# Patient Record
Sex: FEMALE | Race: WHITE | Employment: OTHER | URBAN - METROPOLITAN AREA
[De-identification: names, ages, dates, MRNs, and addresses within clinical notes are randomized per-mention and may not be internally consistent; named-entity substitution may affect disease eponyms.]

---

## 2017-03-29 RX ORDER — VENLAFAXINE HYDROCHLORIDE 37.5 MG/1
CAPSULE, EXTENDED RELEASE ORAL
Qty: 30 CAPSULE | Refills: 0 | Status: SHIPPED | OUTPATIENT
Start: 2017-03-29 | End: 2017-04-24 | Stop reason: SDUPTHER

## 2017-04-24 ENCOUNTER — OFFICE VISIT (OUTPATIENT)
Dept: FAMILY MEDICINE CLINIC | Age: 82
End: 2017-04-24

## 2017-04-24 VITALS
HEART RATE: 78 BPM | HEIGHT: 60 IN | TEMPERATURE: 97.2 F | BODY MASS INDEX: 23.56 KG/M2 | DIASTOLIC BLOOD PRESSURE: 70 MMHG | RESPIRATION RATE: 12 BRPM | WEIGHT: 120 LBS | SYSTOLIC BLOOD PRESSURE: 120 MMHG

## 2017-04-24 DIAGNOSIS — F41.9 ANXIETY: Primary | ICD-10-CM

## 2017-04-24 DIAGNOSIS — E78.1 HYPERTRIGLYCERIDEMIA: ICD-10-CM

## 2017-04-24 DIAGNOSIS — F32.A DEPRESSION, UNSPECIFIED DEPRESSION TYPE: ICD-10-CM

## 2017-04-24 PROCEDURE — 99213 OFFICE O/P EST LOW 20 MIN: CPT | Performed by: FAMILY MEDICINE

## 2017-04-24 RX ORDER — VENLAFAXINE HYDROCHLORIDE 37.5 MG/1
CAPSULE, EXTENDED RELEASE ORAL
Qty: 30 CAPSULE | Refills: 5 | Status: SHIPPED | OUTPATIENT
Start: 2017-04-24 | End: 2017-10-30 | Stop reason: SDUPTHER

## 2017-04-28 RX ORDER — ALPRAZOLAM 0.5 MG/1
0.5 TABLET ORAL NIGHTLY PRN
Qty: 10 TABLET | Refills: 0 | Status: SHIPPED | OUTPATIENT
Start: 2017-04-28 | End: 2017-10-30 | Stop reason: SDUPTHER

## 2017-10-24 DIAGNOSIS — E78.1 HYPERTRIGLYCERIDEMIA: ICD-10-CM

## 2017-10-24 LAB
ALBUMIN SERPL-MCNC: 4.1 G/DL (ref 3.9–4.9)
ALP BLD-CCNC: 67 U/L (ref 40–130)
ALT SERPL-CCNC: 10 U/L (ref 0–33)
ANION GAP SERPL CALCULATED.3IONS-SCNC: 15 MEQ/L (ref 7–13)
AST SERPL-CCNC: 16 U/L (ref 0–35)
BASOPHILS ABSOLUTE: 0 K/UL (ref 0–0.2)
BASOPHILS RELATIVE PERCENT: 0.6 %
BILIRUB SERPL-MCNC: 0.4 MG/DL (ref 0–1.2)
BUN BLDV-MCNC: 48 MG/DL (ref 8–23)
CALCIUM SERPL-MCNC: 9.8 MG/DL (ref 8.6–10.2)
CHLORIDE BLD-SCNC: 109 MEQ/L (ref 98–107)
CHOLESTEROL, TOTAL: 220 MG/DL (ref 0–199)
CO2: 15 MEQ/L (ref 22–29)
CREAT SERPL-MCNC: 1.68 MG/DL (ref 0.5–0.9)
EOSINOPHILS ABSOLUTE: 0.1 K/UL (ref 0–0.7)
EOSINOPHILS RELATIVE PERCENT: 2.1 %
GFR AFRICAN AMERICAN: 35.4
GFR NON-AFRICAN AMERICAN: 29.2
GLOBULIN: 4.8 G/DL (ref 2.3–3.5)
GLUCOSE BLD-MCNC: 94 MG/DL (ref 74–109)
HCT VFR BLD CALC: 34.8 % (ref 37–47)
HDLC SERPL-MCNC: 31 MG/DL (ref 40–59)
HEMOGLOBIN: 11.1 G/DL (ref 12–16)
LDL CHOLESTEROL CALCULATED: 147 MG/DL (ref 0–129)
LYMPHOCYTES ABSOLUTE: 2.3 K/UL (ref 1–4.8)
LYMPHOCYTES RELATIVE PERCENT: 47.3 %
MCH RBC QN AUTO: 32.2 PG (ref 27–31.3)
MCHC RBC AUTO-ENTMCNC: 32 % (ref 33–37)
MCV RBC AUTO: 100.8 FL (ref 82–100)
MONOCYTES ABSOLUTE: 0.7 K/UL (ref 0.2–0.8)
MONOCYTES RELATIVE PERCENT: 14.7 %
NEUTROPHILS ABSOLUTE: 1.7 K/UL (ref 1.4–6.5)
NEUTROPHILS RELATIVE PERCENT: 35.3 %
PDW BLD-RTO: 13.6 % (ref 11.5–14.5)
PLATELET # BLD: 170 K/UL (ref 130–400)
POTASSIUM SERPL-SCNC: 4.5 MEQ/L (ref 3.5–5.1)
RBC # BLD: 3.45 M/UL (ref 4.2–5.4)
SODIUM BLD-SCNC: 139 MEQ/L (ref 132–144)
TOTAL PROTEIN: 8.9 G/DL (ref 6.4–8.1)
TRIGL SERPL-MCNC: 208 MG/DL (ref 0–200)
WBC # BLD: 4.8 K/UL (ref 4.8–10.8)

## 2017-10-30 ENCOUNTER — TELEPHONE (OUTPATIENT)
Dept: FAMILY MEDICINE CLINIC | Age: 82
End: 2017-10-30

## 2017-10-30 ENCOUNTER — OFFICE VISIT (OUTPATIENT)
Dept: FAMILY MEDICINE CLINIC | Age: 82
End: 2017-10-30

## 2017-10-30 VITALS
SYSTOLIC BLOOD PRESSURE: 110 MMHG | BODY MASS INDEX: 22.56 KG/M2 | DIASTOLIC BLOOD PRESSURE: 60 MMHG | TEMPERATURE: 97.9 F | HEIGHT: 60 IN | HEART RATE: 70 BPM | RESPIRATION RATE: 16 BRPM | WEIGHT: 114.9 LBS | OXYGEN SATURATION: 96 %

## 2017-10-30 DIAGNOSIS — F32.A DEPRESSION, UNSPECIFIED DEPRESSION TYPE: ICD-10-CM

## 2017-10-30 DIAGNOSIS — M17.0 PRIMARY OSTEOARTHRITIS OF BOTH KNEES: ICD-10-CM

## 2017-10-30 DIAGNOSIS — F41.9 ANXIETY: Primary | ICD-10-CM

## 2017-10-30 DIAGNOSIS — E78.1 HYPERTRIGLYCERIDEMIA: ICD-10-CM

## 2017-10-30 DIAGNOSIS — I10 ESSENTIAL HYPERTENSION: ICD-10-CM

## 2017-10-30 DIAGNOSIS — N18.4 CKD (CHRONIC KIDNEY DISEASE) STAGE 4, GFR 15-29 ML/MIN (HCC): ICD-10-CM

## 2017-10-30 PROCEDURE — 99214 OFFICE O/P EST MOD 30 MIN: CPT | Performed by: FAMILY MEDICINE

## 2017-10-30 RX ORDER — ALPRAZOLAM 0.5 MG/1
0.5 TABLET ORAL NIGHTLY PRN
Qty: 30 TABLET | Refills: 0 | Status: SHIPPED | OUTPATIENT
Start: 2017-10-30 | End: 2018-01-18 | Stop reason: SDUPTHER

## 2017-10-30 RX ORDER — VENLAFAXINE HYDROCHLORIDE 37.5 MG/1
CAPSULE, EXTENDED RELEASE ORAL
Qty: 30 CAPSULE | Refills: 5 | Status: SHIPPED | OUTPATIENT
Start: 2017-10-30 | End: 2018-05-08 | Stop reason: SDUPTHER

## 2017-10-30 RX ORDER — PRAVASTATIN SODIUM 10 MG
10 TABLET ORAL DAILY
Qty: 30 TABLET | Refills: 2 | Status: SHIPPED | OUTPATIENT
Start: 2017-10-30 | End: 2018-01-18 | Stop reason: SDUPTHER

## 2017-10-30 ASSESSMENT — ENCOUNTER SYMPTOMS
SHORTNESS OF BREATH: 0
ABDOMINAL PAIN: 0

## 2017-12-05 ENCOUNTER — TELEPHONE (OUTPATIENT)
Dept: FAMILY MEDICINE CLINIC | Age: 82
End: 2017-12-05

## 2018-01-18 ENCOUNTER — OFFICE VISIT (OUTPATIENT)
Dept: FAMILY MEDICINE CLINIC | Age: 83
End: 2018-01-18

## 2018-01-18 ENCOUNTER — CARE COORDINATOR VISIT (OUTPATIENT)
Dept: CARE COORDINATION | Age: 83
End: 2018-01-18

## 2018-01-18 VITALS
WEIGHT: 112 LBS | DIASTOLIC BLOOD PRESSURE: 62 MMHG | BODY MASS INDEX: 21.99 KG/M2 | RESPIRATION RATE: 16 BRPM | SYSTOLIC BLOOD PRESSURE: 108 MMHG | TEMPERATURE: 97.9 F | HEART RATE: 80 BPM | HEIGHT: 60 IN

## 2018-01-18 DIAGNOSIS — F41.9 ANXIETY: ICD-10-CM

## 2018-01-18 DIAGNOSIS — J20.9 ACUTE BRONCHITIS, UNSPECIFIED ORGANISM: Primary | ICD-10-CM

## 2018-01-18 DIAGNOSIS — F32.A DEPRESSION, UNSPECIFIED DEPRESSION TYPE: ICD-10-CM

## 2018-01-18 DIAGNOSIS — E78.1 HYPERTRIGLYCERIDEMIA: ICD-10-CM

## 2018-01-18 PROCEDURE — 99214 OFFICE O/P EST MOD 30 MIN: CPT | Performed by: FAMILY MEDICINE

## 2018-01-18 RX ORDER — ALPRAZOLAM 0.5 MG/1
0.5 TABLET ORAL NIGHTLY PRN
Qty: 30 TABLET | Refills: 0 | Status: SHIPPED | OUTPATIENT
Start: 2018-01-18 | End: 2018-06-12 | Stop reason: SDUPTHER

## 2018-01-18 RX ORDER — PRAVASTATIN SODIUM 10 MG
10 TABLET ORAL DAILY
Qty: 30 TABLET | Refills: 2 | Status: SHIPPED | OUTPATIENT
Start: 2018-01-18 | End: 2018-10-08

## 2018-01-18 RX ORDER — DEXTROMETHORPHAN HYDROBROMIDE AND PROMETHAZINE HYDROCHLORIDE 15; 6.25 MG/5ML; MG/5ML
5 SYRUP ORAL 4 TIMES DAILY PRN
Qty: 150 ML | Refills: 0 | Status: SHIPPED | OUTPATIENT
Start: 2018-01-18 | End: 2018-01-25

## 2018-01-18 RX ORDER — AZITHROMYCIN 250 MG/1
TABLET, FILM COATED ORAL
Qty: 1 PACKET | Refills: 0 | Status: SHIPPED | OUTPATIENT
Start: 2018-01-18 | End: 2018-01-28

## 2018-01-18 ASSESSMENT — ENCOUNTER SYMPTOMS
COUGH: 1
SHORTNESS OF BREATH: 0
ABDOMINAL PAIN: 0

## 2018-01-18 NOTE — CARE COORDINATION
Met with pt in the office as requested by Dr. Gordy Carrasco. Pt is having increased difficulty caring for herself and her  at home. Her  has dementia and has been falling more at home. She has had a hard time getting him to his appts and feels she really needs some help at home. Her  goes to the [facility] for [facility]. She is currently not getting any services at home.

## 2018-01-18 NOTE — PROGRESS NOTES
needed for Sleep 30 tablet 0    venlafaxine (EFFEXOR XR) 37.5 MG extended release capsule TAKE 1 CAPSULE BY MOUTH ONCE DAILY 30 capsule 5    pravastatin (PRAVACHOL) 10 MG tablet Take 1 tablet by mouth daily 30 tablet 2    Esomeprazole Magnesium (NEXIUM 24HR PO) Take by mouth      lisinopril (PRINIVIL;ZESTRIL) 10 MG tablet Take 1 tablet by mouth daily. 30 tablet 1    potassium chloride (K-DUR) 10 MEQ tablet Take 10 mEq by mouth daily. No current facility-administered medications for this visit. Family History   Problem Relation Age of Onset    Heart Disease Mother     Liver Disease Father     Heart Disease Sister      Past Medical History:   Diagnosis Date    Anemia     Environmental allergies     Hypertension    Controlled Substances Monitoring:     Attestation: The Prescription Monitoring Report for this patient was reviewed today. Arie Homans, MD)  Documentation: No signs of potential drug abuse or diversion identified. Arie Homans, MD)  Objective:   /62   Pulse 80   Temp 97.9 °F (36.6 °C) (Oral)   Resp 16   Ht 5' (1.524 m)   Wt 112 lb (50.8 kg)   LMP  (LMP Unknown)   Breastfeeding? No   BMI 21.87 kg/m²     Physical Exam  Heent: T.M normal bilat Mild pharyngeal injection. No exudate                Nares patent but swollen mucosa noted  Neck: Minimal anter cervical adenopathy. No masses. No thyroid asymmetry. Lungs: Clear equal breath sounds. No wheezes or rales. Heart: Rate reg 1/6 syst murmur along llsb  Gen: In no acute distress  Patient with appropriate affect--tearful at times. Alert   Thought content appropriate  Good eye contact    Assessment:         1. Acute bronchitis, unspecified organism     2. Anxiety  ALPRAZolam (XANAX) 0.5 MG tablet   3. Depression, unspecified depression type     4.  Hypertriglyceridemia        Plan:     Orders Placed This Encounter   Medications    azithromycin (ZITHROMAX) 250 MG tablet     Sig: Take 2 tabs (500 mg) on Day 1, and take 1 tab (250 mg) on days 2 through 5. Dispense:  1 packet     Refill:  0    promethazine-dextromethorphan (PROMETHAZINE-DM) 6.25-15 MG/5ML syrup     Sig: Take 5 mLs by mouth 4 times daily as needed for Cough     Dispense:  150 mL     Refill:  0    ALPRAZolam (XANAX) 0.5 MG tablet     Sig: Take 1 tablet by mouth nightly as needed for Sleep for up to 30 days.      Dispense:  30 tablet     Refill:  0    pravastatin (PRAVACHOL) 10 MG tablet     Sig: Take 1 tablet by mouth daily     Dispense:  30 tablet     Refill:  2   f/u after fasting blood work in 8 weeks, sooner if needed

## 2018-01-18 NOTE — PATIENT INSTRUCTIONS
Thank you for enrolling in [address] Please follow the instructions below to securely access your online medical record. [facility] allows you to send messages to your doctor, view your test results, renew your prescriptions, schedule appointments, and more. How Do I Sign Up? 1. In your Internet browser, go to [email or web address]  2. Click on the Sign Up Now link in the Sign In box. You will see the New Member Sign Up page. 3. Enter your [facility] Access Code exactly as it appears below. You will not need to use this code after youve completed the sign-up process. If you do not sign up before the expiration date, you must request a new code. [facility] Access Code: [email or web address]  Expires: 3/19/2018 11:50 AM    4. Enter your Social Security Number ([id number]) and Date of Birth (mm/dd/yyyy) as indicated and click Submit. You will be taken to the next sign-up page. 5. Create a [facility] ID. This will be your [facility] login ID and cannot be changed, so think of one that is secure and easy to remember. 6. Create a [facility] password. You can change your password at any time. 7. Enter your Password Reset Question and Answer. This can be used at a later time if you forget your password. 8. Enter your e-mail address. You will receive e-mail notification when new information is available in [address] 9. Click Sign Up. You can now view your medical record. Additional Information  If you have questions, please contact your physician practice where you receive care. Remember, [facility] is NOT to be used for urgent needs. For medical emergencies, dial 911.

## 2018-01-19 ENCOUNTER — CARE COORDINATION (OUTPATIENT)
Dept: CARE COORDINATION | Age: 83
End: 2018-01-19

## 2018-01-22 ENCOUNTER — CARE COORDINATION (OUTPATIENT)
Dept: CARE COORDINATION | Age: 83
End: 2018-01-22

## 2018-01-25 ENCOUNTER — CARE COORDINATION (OUTPATIENT)
Dept: CARE COORDINATION | Age: 83
End: 2018-01-25

## 2018-02-09 ENCOUNTER — CARE COORDINATION (OUTPATIENT)
Dept: CARE COORDINATION | Age: 83
End: 2018-02-09

## 2018-03-02 ENCOUNTER — CARE COORDINATION (OUTPATIENT)
Dept: CARE COORDINATION | Age: 83
End: 2018-03-02

## 2018-03-23 ENCOUNTER — CARE COORDINATION (OUTPATIENT)
Dept: CARE COORDINATION | Age: 83
End: 2018-03-23

## 2018-03-23 NOTE — CARE COORDINATION
[address] call to patient regarding care coordination/social work.    Left voicemail asking for return call

## 2018-04-10 ENCOUNTER — CARE COORDINATION (OUTPATIENT)
Dept: CARE COORDINATION | Age: 83
End: 2018-04-10

## 2018-04-26 ENCOUNTER — CARE COORDINATION (OUTPATIENT)
Dept: CARE COORDINATION | Age: 83
End: 2018-04-26

## 2018-05-09 RX ORDER — VENLAFAXINE HYDROCHLORIDE 37.5 MG/1
CAPSULE, EXTENDED RELEASE ORAL
Qty: 30 CAPSULE | Refills: 0 | Status: SHIPPED | OUTPATIENT
Start: 2018-05-09 | End: 2018-06-08 | Stop reason: SDUPTHER

## 2018-05-30 ENCOUNTER — CARE COORDINATION (OUTPATIENT)
Dept: CARE COORDINATION | Age: 83
End: 2018-05-30

## 2018-06-11 ENCOUNTER — CARE COORDINATION (OUTPATIENT)
Dept: CARE COORDINATION | Age: 83
End: 2018-06-11

## 2018-06-11 RX ORDER — VENLAFAXINE HYDROCHLORIDE 37.5 MG/1
CAPSULE, EXTENDED RELEASE ORAL
Qty: 30 CAPSULE | Refills: 0 | Status: SHIPPED | OUTPATIENT
Start: 2018-06-11 | End: 2018-06-12 | Stop reason: SDUPTHER

## 2018-06-12 ENCOUNTER — OFFICE VISIT (OUTPATIENT)
Dept: FAMILY MEDICINE CLINIC | Age: 83
End: 2018-06-12
Payer: COMMERCIAL

## 2018-06-12 VITALS
TEMPERATURE: 98.2 F | WEIGHT: 116.5 LBS | SYSTOLIC BLOOD PRESSURE: 128 MMHG | DIASTOLIC BLOOD PRESSURE: 84 MMHG | BODY MASS INDEX: 22.87 KG/M2 | HEIGHT: 60 IN | RESPIRATION RATE: 12 BRPM | HEART RATE: 78 BPM | OXYGEN SATURATION: 99 %

## 2018-06-12 DIAGNOSIS — F41.9 ANXIETY: Primary | ICD-10-CM

## 2018-06-12 DIAGNOSIS — N18.4 CKD (CHRONIC KIDNEY DISEASE) STAGE 4, GFR 15-29 ML/MIN (HCC): ICD-10-CM

## 2018-06-12 DIAGNOSIS — F32.A DEPRESSION, UNSPECIFIED DEPRESSION TYPE: ICD-10-CM

## 2018-06-12 DIAGNOSIS — I10 ESSENTIAL HYPERTENSION: ICD-10-CM

## 2018-06-12 DIAGNOSIS — E78.1 HYPERTRIGLYCERIDEMIA: ICD-10-CM

## 2018-06-12 PROCEDURE — 99214 OFFICE O/P EST MOD 30 MIN: CPT | Performed by: FAMILY MEDICINE

## 2018-06-12 RX ORDER — ALPRAZOLAM 0.5 MG/1
0.5 TABLET ORAL NIGHTLY PRN
Qty: 30 TABLET | Refills: 0 | Status: SHIPPED | OUTPATIENT
Start: 2018-06-12 | End: 2018-07-12

## 2018-06-12 RX ORDER — VENLAFAXINE HYDROCHLORIDE 37.5 MG/1
CAPSULE, EXTENDED RELEASE ORAL
Qty: 30 CAPSULE | Refills: 5 | Status: SHIPPED | OUTPATIENT
Start: 2018-06-12 | End: 2019-01-10 | Stop reason: SDUPTHER

## 2018-06-12 ASSESSMENT — ENCOUNTER SYMPTOMS
SHORTNESS OF BREATH: 0
ABDOMINAL PAIN: 0

## 2018-07-18 ENCOUNTER — CARE COORDINATION (OUTPATIENT)
Dept: CARE COORDINATION | Age: 83
End: 2018-07-18

## 2018-08-21 ENCOUNTER — CARE COORDINATION (OUTPATIENT)
Dept: CARE COORDINATION | Age: 83
End: 2018-08-21

## 2018-08-21 NOTE — CARE COORDINATION
Attempted to contact Fatimah to discuss care coordination/ social work. Unable to reach patient by phone. Left voicemail requesting return call.

## 2018-09-12 DIAGNOSIS — I10 ESSENTIAL HYPERTENSION: ICD-10-CM

## 2018-09-12 DIAGNOSIS — E78.1 HYPERTRIGLYCERIDEMIA: ICD-10-CM

## 2018-09-12 LAB
ALBUMIN SERPL-MCNC: 3.8 G/DL (ref 3.9–4.9)
ALP BLD-CCNC: 70 U/L (ref 40–130)
ALT SERPL-CCNC: 11 U/L (ref 0–33)
ANION GAP SERPL CALCULATED.3IONS-SCNC: 15 MEQ/L (ref 7–13)
AST SERPL-CCNC: 19 U/L (ref 0–35)
BASOPHILS ABSOLUTE: 0 K/UL (ref 0–0.2)
BASOPHILS RELATIVE PERCENT: 1 %
BILIRUB SERPL-MCNC: 0.6 MG/DL (ref 0–1.2)
BUN BLDV-MCNC: 40 MG/DL (ref 8–23)
CALCIUM SERPL-MCNC: 9.9 MG/DL (ref 8.6–10.2)
CHLORIDE BLD-SCNC: 104 MEQ/L (ref 98–107)
CHOLESTEROL, TOTAL: 205 MG/DL (ref 0–199)
CO2: 19 MEQ/L (ref 22–29)
CREAT SERPL-MCNC: 1.9 MG/DL (ref 0.5–0.9)
EOSINOPHILS ABSOLUTE: 0.3 K/UL (ref 0–0.7)
EOSINOPHILS RELATIVE PERCENT: 6.4 %
GFR AFRICAN AMERICAN: 30.6
GFR NON-AFRICAN AMERICAN: 25.3
GLOBULIN: 4.7 G/DL (ref 2.3–3.5)
GLUCOSE BLD-MCNC: 78 MG/DL (ref 74–109)
HCT VFR BLD CALC: 34.8 % (ref 37–47)
HDLC SERPL-MCNC: 34 MG/DL (ref 40–59)
HEMOGLOBIN: 11 G/DL (ref 12–16)
LDL CHOLESTEROL CALCULATED: 130 MG/DL (ref 0–129)
LYMPHOCYTES ABSOLUTE: 1.4 K/UL (ref 1–4.8)
LYMPHOCYTES RELATIVE PERCENT: 35.8 %
MCH RBC QN AUTO: 33 PG (ref 27–31.3)
MCHC RBC AUTO-ENTMCNC: 31.7 % (ref 33–37)
MCV RBC AUTO: 104 FL (ref 82–100)
MONOCYTES ABSOLUTE: 0.5 K/UL (ref 0.2–0.8)
MONOCYTES RELATIVE PERCENT: 13.5 %
NEUTROPHILS ABSOLUTE: 1.7 K/UL (ref 1.4–6.5)
NEUTROPHILS RELATIVE PERCENT: 43.3 %
PDW BLD-RTO: 13.5 % (ref 11.5–14.5)
PLATELET # BLD: 181 K/UL (ref 130–400)
POTASSIUM SERPL-SCNC: 4.8 MEQ/L (ref 3.5–5.1)
RBC # BLD: 3.35 M/UL (ref 4.2–5.4)
SODIUM BLD-SCNC: 138 MEQ/L (ref 132–144)
TOTAL PROTEIN: 8.5 G/DL (ref 6.4–8.1)
TRIGL SERPL-MCNC: 205 MG/DL (ref 0–200)
WBC # BLD: 4 K/UL (ref 4.8–10.8)

## 2018-09-19 ENCOUNTER — CARE COORDINATION (OUTPATIENT)
Dept: CARE COORDINATION | Age: 83
End: 2018-09-19

## 2018-09-19 NOTE — CARE COORDINATION
Placed call to Fatimah to discuss care coordination/ social work. Fatimah states he did contact the [address] and is now getting assistance with her . She states [facility] was hesitant to accept help at first, but they are now both grateful for the help. Consulted CC , Bruna Olmos to assist with scheduling patient to see PCP.    Patient is scheduled to see PCP 10/08 at 3:15 PM

## 2018-10-08 ENCOUNTER — OFFICE VISIT (OUTPATIENT)
Dept: FAMILY MEDICINE CLINIC | Age: 83
End: 2018-10-08
Payer: COMMERCIAL

## 2018-10-08 VITALS
HEIGHT: 60 IN | TEMPERATURE: 97.7 F | SYSTOLIC BLOOD PRESSURE: 118 MMHG | HEART RATE: 80 BPM | DIASTOLIC BLOOD PRESSURE: 70 MMHG | WEIGHT: 111.7 LBS | RESPIRATION RATE: 14 BRPM | BODY MASS INDEX: 21.93 KG/M2 | OXYGEN SATURATION: 98 %

## 2018-10-08 DIAGNOSIS — F41.9 ANXIETY: ICD-10-CM

## 2018-10-08 DIAGNOSIS — F32.A DEPRESSION, UNSPECIFIED DEPRESSION TYPE: ICD-10-CM

## 2018-10-08 DIAGNOSIS — D53.9 MACROCYTIC ANEMIA: ICD-10-CM

## 2018-10-08 DIAGNOSIS — I10 ESSENTIAL HYPERTENSION: Primary | ICD-10-CM

## 2018-10-08 DIAGNOSIS — N18.4 CKD (CHRONIC KIDNEY DISEASE) STAGE 4, GFR 15-29 ML/MIN (HCC): ICD-10-CM

## 2018-10-08 DIAGNOSIS — E78.1 HYPERTRIGLYCERIDEMIA: ICD-10-CM

## 2018-10-08 LAB
FOLATE: >20 NG/ML (ref 7.3–26.1)
VITAMIN B-12: 1284 PG/ML (ref 232–1245)

## 2018-10-08 PROCEDURE — 99214 OFFICE O/P EST MOD 30 MIN: CPT | Performed by: FAMILY MEDICINE

## 2018-10-08 RX ORDER — LANOLIN ALCOHOL/MO/W.PET/CERES
1000 CREAM (GRAM) TOPICAL DAILY
COMMUNITY

## 2018-10-08 RX ORDER — ALPRAZOLAM 0.5 MG/1
TABLET ORAL
Refills: 0 | COMMUNITY
Start: 2018-08-10 | End: 2019-06-18 | Stop reason: SDUPTHER

## 2018-10-08 ASSESSMENT — ENCOUNTER SYMPTOMS
ABDOMINAL PAIN: 0
SHORTNESS OF BREATH: 0

## 2018-10-08 ASSESSMENT — PATIENT HEALTH QUESTIONNAIRE - PHQ9
SUM OF ALL RESPONSES TO PHQ9 QUESTIONS 1 & 2: 0
2. FEELING DOWN, DEPRESSED OR HOPELESS: 0
SUM OF ALL RESPONSES TO PHQ QUESTIONS 1-9: 0
SUM OF ALL RESPONSES TO PHQ QUESTIONS 1-9: 0
1. LITTLE INTEREST OR PLEASURE IN DOING THINGS: 0

## 2019-01-10 RX ORDER — VENLAFAXINE HYDROCHLORIDE 37.5 MG/1
CAPSULE, EXTENDED RELEASE ORAL
Qty: 30 CAPSULE | Refills: 0 | Status: SHIPPED | OUTPATIENT
Start: 2019-01-10 | End: 2019-02-11 | Stop reason: SDUPTHER

## 2019-02-12 RX ORDER — VENLAFAXINE HYDROCHLORIDE 37.5 MG/1
CAPSULE, EXTENDED RELEASE ORAL
Qty: 30 CAPSULE | Refills: 0 | Status: SHIPPED | OUTPATIENT
Start: 2019-02-12 | End: 2019-03-08 | Stop reason: SDUPTHER

## 2019-03-08 RX ORDER — VENLAFAXINE HYDROCHLORIDE 37.5 MG/1
37.5 CAPSULE, EXTENDED RELEASE ORAL DAILY
Qty: 30 CAPSULE | Refills: 0 | Status: SHIPPED | OUTPATIENT
Start: 2019-03-08 | End: 2019-04-15 | Stop reason: SDUPTHER

## 2019-03-20 ENCOUNTER — OFFICE VISIT (OUTPATIENT)
Dept: FAMILY MEDICINE CLINIC | Age: 84
End: 2019-03-20
Payer: COMMERCIAL

## 2019-03-20 VITALS
HEART RATE: 78 BPM | RESPIRATION RATE: 14 BRPM | HEIGHT: 60 IN | BODY MASS INDEX: 23.48 KG/M2 | OXYGEN SATURATION: 96 % | TEMPERATURE: 97.8 F | DIASTOLIC BLOOD PRESSURE: 82 MMHG | SYSTOLIC BLOOD PRESSURE: 128 MMHG | WEIGHT: 119.6 LBS

## 2019-03-20 DIAGNOSIS — F41.9 ANXIETY: Primary | ICD-10-CM

## 2019-03-20 DIAGNOSIS — N18.30 CHRONIC KIDNEY DISEASE, STAGE III (MODERATE) (HCC): ICD-10-CM

## 2019-03-20 PROCEDURE — 99213 OFFICE O/P EST LOW 20 MIN: CPT | Performed by: FAMILY MEDICINE

## 2019-03-20 ASSESSMENT — ENCOUNTER SYMPTOMS: SHORTNESS OF BREATH: 0

## 2019-03-20 ASSESSMENT — PATIENT HEALTH QUESTIONNAIRE - PHQ9
1. LITTLE INTEREST OR PLEASURE IN DOING THINGS: 1
SUM OF ALL RESPONSES TO PHQ QUESTIONS 1-9: 2
2. FEELING DOWN, DEPRESSED OR HOPELESS: 1
SUM OF ALL RESPONSES TO PHQ9 QUESTIONS 1 & 2: 2
SUM OF ALL RESPONSES TO PHQ QUESTIONS 1-9: 2

## 2019-04-15 RX ORDER — VENLAFAXINE HYDROCHLORIDE 37.5 MG/1
37.5 CAPSULE, EXTENDED RELEASE ORAL DAILY
Qty: 30 CAPSULE | Refills: 1 | Status: SHIPPED | OUTPATIENT
Start: 2019-04-15 | End: 2019-05-20 | Stop reason: SDUPTHER

## 2019-05-20 RX ORDER — VENLAFAXINE HYDROCHLORIDE 37.5 MG/1
37.5 CAPSULE, EXTENDED RELEASE ORAL DAILY
Qty: 30 CAPSULE | Refills: 1 | Status: SHIPPED | OUTPATIENT
Start: 2019-05-20 | End: 2019-06-18 | Stop reason: SDUPTHER

## 2019-06-18 ENCOUNTER — OFFICE VISIT (OUTPATIENT)
Dept: FAMILY MEDICINE CLINIC | Age: 84
End: 2019-06-18
Payer: COMMERCIAL

## 2019-06-18 VITALS
WEIGHT: 121 LBS | HEART RATE: 67 BPM | HEIGHT: 60 IN | DIASTOLIC BLOOD PRESSURE: 64 MMHG | TEMPERATURE: 97.8 F | BODY MASS INDEX: 23.75 KG/M2 | RESPIRATION RATE: 14 BRPM | SYSTOLIC BLOOD PRESSURE: 110 MMHG

## 2019-06-18 DIAGNOSIS — I10 ESSENTIAL HYPERTENSION: ICD-10-CM

## 2019-06-18 DIAGNOSIS — F32.A DEPRESSION, UNSPECIFIED DEPRESSION TYPE: ICD-10-CM

## 2019-06-18 DIAGNOSIS — H61.23 BILATERAL IMPACTED CERUMEN: ICD-10-CM

## 2019-06-18 DIAGNOSIS — F41.9 ANXIETY: Primary | ICD-10-CM

## 2019-06-18 PROCEDURE — 99213 OFFICE O/P EST LOW 20 MIN: CPT | Performed by: FAMILY MEDICINE

## 2019-06-18 RX ORDER — ALPRAZOLAM 0.5 MG/1
TABLET ORAL
Qty: 30 TABLET | Refills: 1 | Status: SHIPPED | OUTPATIENT
Start: 2019-06-18 | End: 2019-10-01 | Stop reason: SDUPTHER

## 2019-06-18 RX ORDER — VENLAFAXINE HYDROCHLORIDE 37.5 MG/1
37.5 CAPSULE, EXTENDED RELEASE ORAL DAILY
Qty: 30 CAPSULE | Refills: 3 | Status: SHIPPED | OUTPATIENT
Start: 2019-06-18 | End: 2019-07-18 | Stop reason: SDUPTHER

## 2019-06-18 ASSESSMENT — ENCOUNTER SYMPTOMS: SHORTNESS OF BREATH: 0

## 2019-06-18 NOTE — PROGRESS NOTES
Subjective:      Patient ID: Iliana Alvarado is a 81 y.o. female    HPI  Here in follow up for anxiety and depression. Using xanax few days per week. Ears feel blocked   Recently and did have evaluation recently by audiology and it was suggested that she have ears cleaned      Review of Systems   Constitutional: Negative for activity change. Respiratory: Negative for shortness of breath. Neurological: Negative for dizziness and weakness. Psychiatric/Behavioral: Negative for decreased concentration. Controlled substances monitoring: no signs of potential drug abuse or diversion identified and OARRS report reviewed today- activity consistent with treatment plan.   Reviewed allergy, medical, social, surgical, family and med list changes and updated   Files     Social History     Socioeconomic History    Marital status:      Spouse name: None    Number of children: None    Years of education: None    Highest education level: None   Occupational History    None   Social Needs    Financial resource strain: None    Food insecurity:     Worry: None     Inability: None    Transportation needs:     Medical: None     Non-medical: None   Tobacco Use    Smoking status: Never Smoker    Smokeless tobacco: Never Used   Substance and Sexual Activity    Alcohol use: No    Drug use: No    Sexual activity: None   Lifestyle    Physical activity:     Days per week: None     Minutes per session: None    Stress: None   Relationships    Social connections:     Talks on phone: None     Gets together: None     Attends Moravian service: None     Active member of club or organization: None     Attends meetings of clubs or organizations: None     Relationship status: None    Intimate partner violence:     Fear of current or ex partner: None     Emotionally abused: None     Physically abused: None     Forced sexual activity: None   Other Topics Concern    None   Social History Narrative    None     Current Outpatient Medications   Medication Sig Dispense Refill    venlafaxine (EFFEXOR XR) 37.5 MG extended release capsule Take 1 capsule by mouth daily 30 capsule 1    FERROUS SULFATE PO Take 65 mg by mouth daily      vitamin B-12 (CYANOCOBALAMIN) 1000 MCG tablet Take 1,000 mcg by mouth daily      Cholecalciferol (VITAMIN D3 PO) Take by mouth daily      ALPRAZolam (XANAX) 0.5 MG tablet TAKE 1 TABLET BY MOUTH NIGHTLY AS NEEDED FOR SLEEP  0    Esomeprazole Magnesium (NEXIUM 24HR PO) Take 20 mg by mouth daily       lisinopril (PRINIVIL;ZESTRIL) 10 MG tablet Take 1 tablet by mouth daily. 30 tablet 1    potassium chloride (K-DUR) 10 MEQ tablet Take 10 mEq by mouth daily. No current facility-administered medications for this visit. Family History   Problem Relation Age of Onset    Heart Disease Mother     Liver Disease Father     Heart Disease Sister      Past Medical History:   Diagnosis Date    Anemia     Environmental allergies     Hypertension      Objective:   /64   Pulse 67   Temp 97.8 °F (36.6 °C)   Resp 14   Ht 5' (1.524 m)   Wt 121 lb (54.9 kg)   LMP  (LMP Unknown)   BMI 23.63 kg/m²     Physical Exam  Patient with appropriate affect. Alert   Thought content appropriate  Good eye contact    Gen:   NAD  Heent; Both T.M's occluded with cerumen. No canal swelling or tragus tenderness. Assessment:       Diagnosis Orders   1. Anxiety  ALPRAZolam (XANAX) 0.5 MG tablet   2. Depression, unspecified depression type     3. Bilateral impacted cerumen     4. Essential hypertension           Plan:    ear irrigation attempted.   Left side looks good after irrigation with normal T.M   But right side still showing cerumen  Can use otc debrox for now   Orders Placed This Encounter   Medications    venlafaxine (EFFEXOR XR) 37.5 MG extended release capsule     Sig: Take 1 capsule by mouth daily     Dispense:  30 capsule     Refill:  3    ALPRAZolam (XANAX) 0.5 MG tablet     Sig: TAKE 1

## 2019-06-25 ENCOUNTER — TELEPHONE (OUTPATIENT)
Dept: FAMILY MEDICINE CLINIC | Age: 84
End: 2019-06-25

## 2019-06-25 RX ORDER — AZITHROMYCIN 250 MG/1
TABLET, FILM COATED ORAL
Qty: 6 TABLET | Refills: 0 | Status: SHIPPED | OUTPATIENT
Start: 2019-06-25 | End: 2019-10-01

## 2019-06-25 NOTE — TELEPHONE ENCOUNTER
Medication sent. Tried to contact pt several times to make her aware. Busy tone on every attempt. Will try again soon.

## 2019-06-25 NOTE — TELEPHONE ENCOUNTER
Patient would like to know if an antibiotic can be called in to her local pharmacy? She has some cold and flu symptoms for a few days now.  Please advise, thank you

## 2019-07-18 RX ORDER — VENLAFAXINE HYDROCHLORIDE 37.5 MG/1
37.5 CAPSULE, EXTENDED RELEASE ORAL DAILY
Qty: 30 CAPSULE | Refills: 3 | Status: SHIPPED | OUTPATIENT
Start: 2019-07-18 | End: 2019-09-19 | Stop reason: SDUPTHER

## 2019-09-18 DIAGNOSIS — I10 ESSENTIAL HYPERTENSION: ICD-10-CM

## 2019-09-18 LAB
ALBUMIN SERPL-MCNC: 3.7 G/DL (ref 3.5–4.6)
ALP BLD-CCNC: 68 U/L (ref 40–130)
ALT SERPL-CCNC: 12 U/L (ref 0–33)
ANION GAP SERPL CALCULATED.3IONS-SCNC: 10 MEQ/L (ref 9–15)
AST SERPL-CCNC: 21 U/L (ref 0–35)
BASOPHILS ABSOLUTE: 0 K/UL (ref 0–0.2)
BASOPHILS RELATIVE PERCENT: 0.9 %
BILIRUB SERPL-MCNC: 0.6 MG/DL (ref 0.2–0.7)
BUN BLDV-MCNC: 34 MG/DL (ref 8–23)
CALCIUM SERPL-MCNC: 9.6 MG/DL (ref 8.5–9.9)
CHLORIDE BLD-SCNC: 108 MEQ/L (ref 95–107)
CHOLESTEROL, TOTAL: 204 MG/DL (ref 0–199)
CO2: 20 MEQ/L (ref 20–31)
CREAT SERPL-MCNC: 1.7 MG/DL (ref 0.5–0.9)
EOSINOPHILS ABSOLUTE: 0.1 K/UL (ref 0–0.7)
EOSINOPHILS RELATIVE PERCENT: 3 %
GFR AFRICAN AMERICAN: 34.7
GFR NON-AFRICAN AMERICAN: 28.7
GLOBULIN: 4.7 G/DL (ref 2.3–3.5)
GLUCOSE BLD-MCNC: 86 MG/DL (ref 70–99)
HCT VFR BLD CALC: 32.4 % (ref 37–47)
HDLC SERPL-MCNC: 35 MG/DL (ref 40–59)
HEMOGLOBIN: 10.7 G/DL (ref 12–16)
LDL CHOLESTEROL CALCULATED: 128 MG/DL (ref 0–129)
LYMPHOCYTES ABSOLUTE: 1.6 K/UL (ref 1–4.8)
LYMPHOCYTES RELATIVE PERCENT: 38.2 %
MCH RBC QN AUTO: 32.8 PG (ref 27–31.3)
MCHC RBC AUTO-ENTMCNC: 33 % (ref 33–37)
MCV RBC AUTO: 99.4 FL (ref 82–100)
MONOCYTES ABSOLUTE: 0.6 K/UL (ref 0.2–0.8)
MONOCYTES RELATIVE PERCENT: 14 %
NEUTROPHILS ABSOLUTE: 1.8 K/UL (ref 1.4–6.5)
NEUTROPHILS RELATIVE PERCENT: 43.9 %
PDW BLD-RTO: 13.3 % (ref 11.5–14.5)
PLATELET # BLD: 164 K/UL (ref 130–400)
POTASSIUM SERPL-SCNC: 4.5 MEQ/L (ref 3.4–4.9)
RBC # BLD: 3.26 M/UL (ref 4.2–5.4)
SODIUM BLD-SCNC: 138 MEQ/L (ref 135–144)
TOTAL PROTEIN: 8.4 G/DL (ref 6.3–8)
TRIGL SERPL-MCNC: 206 MG/DL (ref 0–150)
WBC # BLD: 4.2 K/UL (ref 4.8–10.8)

## 2019-09-19 RX ORDER — VENLAFAXINE HYDROCHLORIDE 37.5 MG/1
37.5 CAPSULE, EXTENDED RELEASE ORAL DAILY
Qty: 30 CAPSULE | Refills: 0 | Status: SHIPPED | OUTPATIENT
Start: 2019-09-19 | End: 2020-01-27

## 2019-10-01 ENCOUNTER — OFFICE VISIT (OUTPATIENT)
Dept: FAMILY MEDICINE CLINIC | Age: 84
End: 2019-10-01
Payer: COMMERCIAL

## 2019-10-01 VITALS
HEART RATE: 84 BPM | BODY MASS INDEX: 22.97 KG/M2 | TEMPERATURE: 98.8 F | HEIGHT: 60 IN | DIASTOLIC BLOOD PRESSURE: 70 MMHG | OXYGEN SATURATION: 97 % | WEIGHT: 117 LBS | SYSTOLIC BLOOD PRESSURE: 110 MMHG | RESPIRATION RATE: 16 BRPM

## 2019-10-01 DIAGNOSIS — E78.1 HYPERTRIGLYCERIDEMIA: ICD-10-CM

## 2019-10-01 DIAGNOSIS — N18.30 CHRONIC KIDNEY DISEASE, STAGE III (MODERATE) (HCC): ICD-10-CM

## 2019-10-01 DIAGNOSIS — D64.9 ANEMIA, UNSPECIFIED TYPE: ICD-10-CM

## 2019-10-01 DIAGNOSIS — F41.9 ANXIETY: Primary | ICD-10-CM

## 2019-10-01 DIAGNOSIS — F51.04 CHRONIC INSOMNIA: ICD-10-CM

## 2019-10-01 DIAGNOSIS — F32.A DEPRESSION, UNSPECIFIED DEPRESSION TYPE: ICD-10-CM

## 2019-10-01 DIAGNOSIS — M17.0 OSTEOARTHRITIS OF BOTH KNEES, UNSPECIFIED OSTEOARTHRITIS TYPE: ICD-10-CM

## 2019-10-01 DIAGNOSIS — I10 ESSENTIAL HYPERTENSION: ICD-10-CM

## 2019-10-01 PROCEDURE — 99214 OFFICE O/P EST MOD 30 MIN: CPT | Performed by: FAMILY MEDICINE

## 2019-10-01 PROCEDURE — 90653 IIV ADJUVANT VACCINE IM: CPT | Performed by: FAMILY MEDICINE

## 2019-10-01 PROCEDURE — 90471 IMMUNIZATION ADMIN: CPT | Performed by: FAMILY MEDICINE

## 2019-10-01 RX ORDER — ALPRAZOLAM 0.5 MG/1
TABLET ORAL
Qty: 30 TABLET | Refills: 1 | Status: SHIPPED | OUTPATIENT
Start: 2019-10-01 | End: 2019-10-31

## 2019-10-01 ASSESSMENT — ENCOUNTER SYMPTOMS
SHORTNESS OF BREATH: 0
ABDOMINAL PAIN: 0

## 2020-01-27 RX ORDER — VENLAFAXINE HYDROCHLORIDE 37.5 MG/1
37.5 CAPSULE, EXTENDED RELEASE ORAL DAILY
Qty: 30 CAPSULE | Refills: 0 | Status: SHIPPED | OUTPATIENT
Start: 2020-01-27 | End: 2020-02-24

## 2020-02-24 RX ORDER — VENLAFAXINE HYDROCHLORIDE 37.5 MG/1
37.5 CAPSULE, EXTENDED RELEASE ORAL DAILY
Qty: 30 CAPSULE | Refills: 0 | Status: SHIPPED | OUTPATIENT
Start: 2020-02-24 | End: 2020-03-25

## 2020-07-31 ENCOUNTER — OFFICE VISIT (OUTPATIENT)
Dept: FAMILY MEDICINE CLINIC | Age: 85
End: 2020-07-31
Payer: MEDICARE

## 2020-07-31 VITALS
WEIGHT: 111 LBS | BODY MASS INDEX: 21.68 KG/M2 | TEMPERATURE: 97.8 F | DIASTOLIC BLOOD PRESSURE: 70 MMHG | OXYGEN SATURATION: 98 % | RESPIRATION RATE: 12 BRPM | SYSTOLIC BLOOD PRESSURE: 108 MMHG | HEART RATE: 86 BPM

## 2020-07-31 PROCEDURE — 96372 THER/PROPH/DIAG INJ SC/IM: CPT | Performed by: NURSE PRACTITIONER

## 2020-07-31 PROCEDURE — 99213 OFFICE O/P EST LOW 20 MIN: CPT | Performed by: NURSE PRACTITIONER

## 2020-07-31 RX ORDER — TRIAMCINOLONE ACETONIDE 40 MG/ML
40 INJECTION, SUSPENSION INTRA-ARTICULAR; INTRAMUSCULAR ONCE
Status: COMPLETED | OUTPATIENT
Start: 2020-07-31 | End: 2020-07-31

## 2020-07-31 RX ADMIN — TRIAMCINOLONE ACETONIDE 40 MG: 40 INJECTION, SUSPENSION INTRA-ARTICULAR; INTRAMUSCULAR at 15:39

## 2020-07-31 NOTE — PROGRESS NOTES
Subjective  Fatimah Simmons, 81 y.o. female presents today with:  Chief Complaint   Patient presents with    Rash     Patient statient she has noticed a rash on her arms and legs complaint 7/29/20. Admits to taking over the counter Calamine lotion. HPI   Pt presents to [facility] for skin rash  Recently pulling weeds in her flower beds  Tuesday developed the rash after exposure to poison oak   The rash spread now onto both her arms and legs   Rash is itchy. No drainage  Denies new joint pain w/rash  Denies cough, itchy throat or difficulty breathing     Calamine and ivy dry applied  Has zyrtec at home        Past Medical History:   Diagnosis Date    Anemia     Environmental allergies     Hypertension       Past Surgical History:   Procedure Laterality Date    COLONOSCOPY  3/10/10    DR Tong Ambrocio    HYSTERECTOMY  1992    NASAL POLYP SURGERY  1980    UPPER GASTROINTESTINAL ENDOSCOPY       Family History   Problem Relation Age of Onset    Heart Disease Mother     Liver Disease Father     Heart Disease Sister        Review of Systems   Constitutional: Negative for activity change, appetite change, chills, diaphoresis, fatigue and fever. HENT: Negative for congestion, rhinorrhea, sore throat, trouble swallowing and voice change. Eyes: Negative for discharge, redness and itching. Respiratory: Negative for cough, chest tightness, shortness of breath and wheezing. Gastrointestinal: Negative for abdominal pain, diarrhea and nausea. Musculoskeletal: Negative for arthralgias. Skin: Positive for rash. Neurological: Negative for dizziness, light-headedness and headaches. PMH, Surgical Hx, Family Hx, and Social Hx reviewed and updated.       Objective  Vitals:    07/31/20 1406   BP: 108/70   Site: Left Upper Arm   Position: Sitting   Cuff Size: Small Adult   Pulse: 86   Resp: 12   Temp: 97.8 °F (36.6 °C)   TempSrc: Temporal   SpO2: 98%   Weight: 111 lb (50.3 kg)     BP Readings from Last 3 Encounters:   07/31/20 108/70   10/01/19 110/70   06/18/19 110/64     Wt Readings from Last 3 Encounters:   07/31/20 111 lb (50.3 kg)   10/01/19 117 lb (53.1 kg)   06/18/19 121 lb (54.9 kg)       Physical Exam  Vitals signs reviewed. Constitutional:       General: She is not in acute distress. Appearance: Normal appearance. She is well-developed. She is not diaphoretic. HENT:      Head: Normocephalic. Eyes:      Conjunctiva/sclera: Conjunctivae normal.      Pupils: Pupils are equal, round, and reactive to light. Cardiovascular:      Rate and Rhythm: Normal rate. Pulmonary:      Effort: Pulmonary effort is normal.      Breath sounds: Normal breath sounds. Skin:     General: Skin is warm. Findings: Rash present. Rash is papular. Comments: No s/s of infection to areas of rash. No drainage    Neurological:      Mental Status: She is alert and oriented to person, place, and time. Assessment & Plan    Diagnosis Orders   1. Contact dermatitis due to plants, except food, unspecified contact dermatitis type  triamcinolone acetonide (KENALOG-40) injection 40 mg    triamcinolone (KENALOG) 0.1 % ointment     No orders of the defined types were placed in this encounter. Orders Placed This Encounter   Medications    triamcinolone acetonide (KENALOG-40) injection 40 mg    triamcinolone (KENALOG) 0.1 % ointment     Sig: Apply topically 2 times daily for 14 days     Dispense:  1 Tube     Refill:  0     Return if symptoms worsen or fail to improve, for follow up with PCP. Reviewed with the patient: current clinical status & medications. Side effects, adverse effects of the medication administered prescribed today, as well as treatment plan/rationale and result expectations have been discussed with the patient who expresses understanding. How can you care for yourself at home? · If your doctor prescribed a cream, use it as directed.  If your doctor prescribed medicine, take it exactly as prescribed. Call your doctor if you think you are having a problem with your medicine. · Use cold, wet cloths to reduce itching. · Keep cool, and stay out of the sun. · Leave the rash open to the air. · Wash all clothing or other things that may have come in contact with the plant oil. · Avoid most lotions and ointments until the rash heals. Calamine lotion may help relieve symptoms of a plant rash. Use it 3 or 4 times a day. When should you call for help? Call your doctor now or seek immediate medical care if:  · Your rash gets worse, and you start to feel bad and have a fever, a stiff neck, nausea, and vomiting. · You have signs of infection, such as:  ? Increased pain, swelling, warmth, or redness. ? Red streaks leading from the rash. ? Pus draining from the rash. ? A fever. Watch closely for changes in your health, and be sure to contact your doctor if:  · You have new blisters or bruises, or the rash spreads and looks like a sunburn. · The rash gets worse, or it comes back after nearly disappearing. · You think a medicine you are using is making your rash worse. · Your rash does not clear up after 1 to 2 weeks of home treatment. · You have joint aches or body aches with your rash. Close follow up to evaluate treatment results and for coordination of care. I have reviewed the patient's medical history in detail and updated the computerized patient record.       Terrie Velazco, APRN - NP

## 2020-07-31 NOTE — PATIENT INSTRUCTIONS
Patient Education        Poison Salvadore Arias, [place], and [place]: Care Instructions  Your Care Instructions     Poison ivy, poison oak, and poison sumac are plants that can cause a skin rash upon contact. The red, itchy rash often shows up in lines or streaks and may cause fluid-filled blisters or large, raised hives. The rash is caused by an allergic reaction to an oil in poison ivy, oak, and sumac. The rash may occur when you touch the plant or when you touch clothing, pet fur, sporting gear, gardening tools, or other objects that have come in contact with one of these plants. You cannot catch or spread the rash, even if you touch it or the blister fluid, because the plant oil will already have been absorbed or washed off the skin. The rash may seem to be spreading, but either it is still developing from earlier contact or you have touched something that still has the plant oil on it. Follow-up care is a key part of your treatment and safety. Be sure to make and go to all appointments, and call your doctor if you are having problems. It's also a good idea to know your test results and keep a list of the medicines you take. How can you care for yourself at home? · If your doctor prescribed a cream, use it as directed. If your doctor prescribed medicine, take it exactly as prescribed. Call your doctor if you think you are having a problem with your medicine. · Use cold, wet cloths to reduce itching. · Keep cool, and stay out of the sun. · Leave the rash open to the air. · Wash all clothing or other things that may have come in contact with the plant oil. · Avoid most lotions and ointments until the rash heals. Calamine lotion may help relieve symptoms of a plant rash. Use it 3 or 4 times a day. To prevent poison ivy exposure  If you know that you will be near poison ivy, oak, or sumac, you can try these options:  · Use a product designed to help prevent plant oil from getting on the skin.  These products, such as Salvadore Arias

## 2020-08-02 ASSESSMENT — ENCOUNTER SYMPTOMS
COUGH: 0
DIARRHEA: 0
EYE ITCHING: 0
RHINORRHEA: 0
TROUBLE SWALLOWING: 0
VOICE CHANGE: 0
SORE THROAT: 0
EYE REDNESS: 0
ABDOMINAL PAIN: 0
SHORTNESS OF BREATH: 0
NAUSEA: 0
EYE DISCHARGE: 0
CHEST TIGHTNESS: 0
WHEEZING: 0

## 2020-08-12 ENCOUNTER — OFFICE VISIT (OUTPATIENT)
Dept: FAMILY MEDICINE CLINIC | Age: 85
End: 2020-08-12
Payer: MEDICARE

## 2020-08-12 VITALS
SYSTOLIC BLOOD PRESSURE: 120 MMHG | HEART RATE: 70 BPM | RESPIRATION RATE: 12 BRPM | DIASTOLIC BLOOD PRESSURE: 76 MMHG | OXYGEN SATURATION: 98 % | HEIGHT: 60 IN | BODY MASS INDEX: 21.99 KG/M2 | TEMPERATURE: 97.3 F | WEIGHT: 112 LBS

## 2020-08-12 PROBLEM — N18.4 CKD (CHRONIC KIDNEY DISEASE) STAGE 4, GFR 15-29 ML/MIN (HCC): Status: ACTIVE | Noted: 2020-08-12

## 2020-08-12 PROCEDURE — 3288F FALL RISK ASSESSMENT DOCD: CPT | Performed by: FAMILY MEDICINE

## 2020-08-12 PROCEDURE — 99214 OFFICE O/P EST MOD 30 MIN: CPT | Performed by: FAMILY MEDICINE

## 2020-08-12 PROCEDURE — G8510 SCR DEP NEG, NO PLAN REQD: HCPCS | Performed by: FAMILY MEDICINE

## 2020-08-12 RX ORDER — ALPRAZOLAM 0.5 MG/1
TABLET ORAL
Qty: 14 TABLET | Refills: 0 | Status: SHIPPED | OUTPATIENT
Start: 2020-08-12 | End: 2021-01-08 | Stop reason: SDUPTHER

## 2020-08-12 ASSESSMENT — PATIENT HEALTH QUESTIONNAIRE - PHQ9
1. LITTLE INTEREST OR PLEASURE IN DOING THINGS: 0
SUM OF ALL RESPONSES TO PHQ QUESTIONS 1-9: 0
SUM OF ALL RESPONSES TO PHQ9 QUESTIONS 1 & 2: 0
2. FEELING DOWN, DEPRESSED OR HOPELESS: 0
SUM OF ALL RESPONSES TO PHQ QUESTIONS 1-9: 0

## 2020-08-12 ASSESSMENT — ENCOUNTER SYMPTOMS: ABDOMINAL PAIN: 0

## 2020-08-12 NOTE — PROGRESS NOTES
Subjective:      Patient ID: Castro Villarreal is a 81 y.o. female    Mental Health Problem     Additional symptoms of the illness do not include abdominal pain. Hyperlipidemia   Pertinent negatives include no myalgias. Here in follow up for lipids and anxiety and depression and chronic insomnia. bp med stopped per renal recently as creatinine had increased  . Rare use of xanax for sleep. Still taking effexor and not missing doses. Review of Systems   Constitutional: Negative for chills and fever. Cardiovascular: Negative for leg swelling. Gastrointestinal: Negative for abdominal pain. Musculoskeletal: Positive for arthralgias. Negative for myalgias. Skin: Negative for rash. Neurological: Negative for weakness. Psychiatric/Behavioral: Negative for self-injury and suicidal ideas. The patient is hyperactive. Reviewed allergy, medical, social, surgical, family and med list changes and updated   Files  Controlled substances monitoring: no signs of potential drug abuse or diversion identified and OARRS report reviewed today- activity consistent with treatment plan.    Social History     Socioeconomic History    Marital status:      Spouse name: None    Number of children: None    Years of education: None    Highest education level: None   Occupational History    None   Social Needs    Financial resource strain: None    Food insecurity     Worry: None     Inability: None    Transportation needs     Medical: None     Non-medical: None   Tobacco Use    Smoking status: Never Smoker    Smokeless tobacco: Never Used   Substance and Sexual Activity    Alcohol use: No    Drug use: No    Sexual activity: None   Lifestyle    Physical activity     Days per week: None     Minutes per session: None    Stress: None   Relationships    Social connections     Talks on phone: None     Gets together: None     Attends Oriental orthodox service: None     Active member of club or organization: None

## 2020-08-25 ENCOUNTER — TELEPHONE (OUTPATIENT)
Dept: FAMILY MEDICINE CLINIC | Age: 85
End: 2020-08-25

## 2020-09-11 DIAGNOSIS — N18.4 CKD (CHRONIC KIDNEY DISEASE) STAGE 4, GFR 15-29 ML/MIN (HCC): ICD-10-CM

## 2020-09-11 DIAGNOSIS — E78.1 HYPERTRIGLYCERIDEMIA: ICD-10-CM

## 2020-09-11 LAB
ALBUMIN SERPL-MCNC: 3.5 G/DL (ref 3.5–4.6)
ALP BLD-CCNC: 74 U/L (ref 40–130)
ALT SERPL-CCNC: 11 U/L (ref 0–33)
ANION GAP SERPL CALCULATED.3IONS-SCNC: 10 MEQ/L (ref 9–15)
AST SERPL-CCNC: 16 U/L (ref 0–35)
BASOPHILS ABSOLUTE: 0 K/UL (ref 0–0.2)
BASOPHILS RELATIVE PERCENT: 0.5 %
BILIRUB SERPL-MCNC: 0.4 MG/DL (ref 0.2–0.7)
BUN BLDV-MCNC: 28 MG/DL (ref 8–23)
CALCIUM SERPL-MCNC: 8.7 MG/DL (ref 8.5–9.9)
CHLORIDE BLD-SCNC: 113 MEQ/L (ref 95–107)
CHOLESTEROL, TOTAL: 195 MG/DL (ref 0–199)
CO2: 18 MEQ/L (ref 20–31)
CREAT SERPL-MCNC: 1.7 MG/DL (ref 0.5–0.9)
EOSINOPHILS ABSOLUTE: 0.1 K/UL (ref 0–0.7)
EOSINOPHILS RELATIVE PERCENT: 2.1 %
GFR AFRICAN AMERICAN: 34.7
GFR NON-AFRICAN AMERICAN: 28.6
GLOBULIN: 3.9 G/DL (ref 2.3–3.5)
GLUCOSE BLD-MCNC: 90 MG/DL (ref 70–99)
HCT VFR BLD CALC: 33.3 % (ref 37–47)
HDLC SERPL-MCNC: 38 MG/DL (ref 40–59)
HEMOGLOBIN: 10.7 G/DL (ref 12–16)
LDL CHOLESTEROL CALCULATED: 127 MG/DL (ref 0–129)
LYMPHOCYTES ABSOLUTE: 1.9 K/UL (ref 1–4.8)
LYMPHOCYTES RELATIVE PERCENT: 40 %
MCH RBC QN AUTO: 32.8 PG (ref 27–31.3)
MCHC RBC AUTO-ENTMCNC: 32.2 % (ref 33–37)
MCV RBC AUTO: 101.9 FL (ref 82–100)
MONOCYTES ABSOLUTE: 0.7 K/UL (ref 0.2–0.8)
MONOCYTES RELATIVE PERCENT: 14 %
NEUTROPHILS ABSOLUTE: 2.1 K/UL (ref 1.4–6.5)
NEUTROPHILS RELATIVE PERCENT: 43.4 %
PDW BLD-RTO: 14.7 % (ref 11.5–14.5)
PLATELET # BLD: 174 K/UL (ref 130–400)
POTASSIUM SERPL-SCNC: 3.7 MEQ/L (ref 3.4–4.9)
RBC # BLD: 3.27 M/UL (ref 4.2–5.4)
SODIUM BLD-SCNC: 141 MEQ/L (ref 135–144)
TOTAL PROTEIN: 7.4 G/DL (ref 6.3–8)
TRIGL SERPL-MCNC: 152 MG/DL (ref 0–150)
WBC # BLD: 4.9 K/UL (ref 4.8–10.8)

## 2020-09-16 RX ORDER — VENLAFAXINE HYDROCHLORIDE 37.5 MG/1
CAPSULE, EXTENDED RELEASE ORAL
Qty: 90 CAPSULE | Refills: 0 | Status: SHIPPED | OUTPATIENT
Start: 2020-09-16 | End: 2020-12-21

## 2020-12-21 RX ORDER — VENLAFAXINE HYDROCHLORIDE 37.5 MG/1
CAPSULE, EXTENDED RELEASE ORAL
Qty: 30 CAPSULE | Refills: 0 | Status: SHIPPED | OUTPATIENT
Start: 2020-12-21 | End: 2021-01-14

## 2021-01-08 DIAGNOSIS — F41.9 ANXIETY: ICD-10-CM

## 2021-01-08 DIAGNOSIS — F51.04 CHRONIC INSOMNIA: ICD-10-CM

## 2021-01-08 RX ORDER — ALPRAZOLAM 0.5 MG/1
TABLET ORAL
Qty: 14 TABLET | Refills: 0 | Status: SHIPPED | OUTPATIENT
Start: 2021-01-08 | End: 2021-02-07

## 2021-01-08 NOTE — TELEPHONE ENCOUNTER
Fatimah called office requesting medication refill. verifed next wks follow up vv. Pt states she needs refill due to having anxiety with \"a lot going on\" taking care of her .     Rx requested:  Requested Prescriptions     Pending Prescriptions Disp Refills    ALPRAZolam (XANAX) 0.5 MG tablet 14 tablet 0     Sig: TAKE 1 TABLET BY MOUTH NIGHTLY AS NEEDED FOR SLEEP       Last Office Visit:   8/12/2020    Last Tox screen:    Last Medication contract:    Next Visit Date:  Future Appointments   Date Time Provider Latosha Bull   1/12/2021  1:30 PM Roseanne Price  [place], [address]   verified vv with pt//sxc

## 2021-01-14 RX ORDER — VENLAFAXINE HYDROCHLORIDE 37.5 MG/1
CAPSULE, EXTENDED RELEASE ORAL
Qty: 30 CAPSULE | Refills: 0 | Status: SHIPPED | OUTPATIENT
Start: 2021-01-14 | End: 2021-02-15

## 2021-01-22 ENCOUNTER — OFFICE VISIT (OUTPATIENT)
Dept: FAMILY MEDICINE CLINIC | Age: 86
End: 2021-01-22
Payer: MEDICARE

## 2021-01-22 VITALS
RESPIRATION RATE: 16 BRPM | WEIGHT: 116 LBS | HEIGHT: 63 IN | SYSTOLIC BLOOD PRESSURE: 130 MMHG | HEART RATE: 63 BPM | BODY MASS INDEX: 20.55 KG/M2 | DIASTOLIC BLOOD PRESSURE: 80 MMHG | TEMPERATURE: 97.9 F | OXYGEN SATURATION: 97 %

## 2021-01-22 DIAGNOSIS — F51.04 CHRONIC INSOMNIA: ICD-10-CM

## 2021-01-22 DIAGNOSIS — F41.9 ANXIETY: Primary | ICD-10-CM

## 2021-01-22 DIAGNOSIS — N18.4 CKD (CHRONIC KIDNEY DISEASE) STAGE 4, GFR 15-29 ML/MIN (HCC): ICD-10-CM

## 2021-01-22 PROCEDURE — G8510 SCR DEP NEG, NO PLAN REQD: HCPCS | Performed by: FAMILY MEDICINE

## 2021-01-22 PROCEDURE — 99213 OFFICE O/P EST LOW 20 MIN: CPT | Performed by: FAMILY MEDICINE

## 2021-01-22 RX ORDER — POTASSIUM CHLORIDE 7.45 MG/ML
10 INJECTION INTRAVENOUS ONCE
COMMUNITY

## 2021-01-22 ASSESSMENT — PATIENT HEALTH QUESTIONNAIRE - PHQ9: SUM OF ALL RESPONSES TO PHQ QUESTIONS 1-9: 2

## 2021-01-22 NOTE — PROGRESS NOTES
Subjective:      Patient ID: Jesika Coto is a 81 y.o. female    HPI  Here in follow up for anxiety and chronic insomnia problems. Using xanax periodically. Having stresses at home with  which have caused more anxiety. Will be getting services thru [place] soon to help with this. Review of Systems   Constitutional: Negative for chills and fever. Cardiovascular: Negative for chest pain. Skin: Negative for rash. Neurological: Negative for weakness. Reviewed allergy, medical, social, surgical, family and med list changes and updated   Files   Controlled substances monitoring: no signs of potential drug abuse or diversion identified and OARRS report reviewed today- activity consistent with treatment plan.   Social History     Socioeconomic History    Marital status:      Spouse name: None    Number of children: None    Years of education: None    Highest education level: None   Occupational History    None   Social Needs    Financial resource strain: None    Food insecurity     Worry: None     Inability: None    Transportation needs     Medical: None     Non-medical: None   Tobacco Use    Smoking status: Never Smoker    Smokeless tobacco: Never Used   Substance and Sexual Activity    Alcohol use: No    Drug use: No    Sexual activity: None   Lifestyle    Physical activity     Days per week: None     Minutes per session: None    Stress: None   Relationships    Social connections     Talks on phone: None     Gets together: None     Attends Worship service: None     Active member of club or organization: None     Attends meetings of clubs or organizations: None     Relationship status: None    Intimate partner violence     Fear of current or ex partner: None     Emotionally abused: None     Physically abused: None     Forced sexual activity: None   Other Topics Concern    None   Social History Narrative    None     Current Outpatient Medications   Medication Sig Dispense Refill    Prenatal Multivit-Min-Fe-FA (PRE-JUANITO PO) Take by mouth      potassium chloride 10 MEQ/100ML Infuse 10 mEq intravenously once      venlafaxine (EFFEXOR XR) 37.5 MG extended release capsule TAKE 1 CAPSULE BY MOUTH ONCE DAILY 30 capsule 0    Handicap Placard MISC by Does not apply route X 2 years 1 each 0    FERROUS SULFATE PO Take 65 mg by mouth daily      Esomeprazole Magnesium (NEXIUM 24HR PO) Take 20 mg by mouth daily       ALPRAZolam (XANAX) 0.5 MG tablet TAKE 1 TABLET BY MOUTH NIGHTLY AS NEEDED FOR SLEEP 14 tablet 0    vitamin B-12 (CYANOCOBALAMIN) 1000 MCG tablet Take 1,000 mcg by mouth daily       No current facility-administered medications for this visit. Family History   Problem Relation Age of Onset    Heart Disease Mother     Liver Disease Father     Heart Disease Sister      Past Medical History:   Diagnosis Date    Anemia     Environmental allergies     Hypertension      Objective:   /80   Pulse 63   Temp 97.9 °F (36.6 °C)   Resp 16   Ht 5' 3\" (1.6 m)   Wt 116 lb (52.6 kg)   LMP  (LMP Unknown)   SpO2 97%   BMI 20.55 kg/m²     Physical Exam  Patient with appropriate affect. Alert    Thought content appropriate  Good eye contact    Gen:   NAD  Lungs: clear and equal breath sounds  Heart:   Rate reg. No murmur  Assessment:          Diagnosis Orders   1. Anxiety     2. Chronic insomnia     3.  CKD (chronic kidney disease) stage 4, GFR 15-29 ml/min ([facility])          Plan:   ckd stable and followed by renal-continue current meds    Continue current medications   F/u in 3 months

## 2021-02-12 ENCOUNTER — TELEPHONE (OUTPATIENT)
Dept: FAMILY MEDICINE CLINIC | Age: 86
End: 2021-02-12

## 2021-04-19 RX ORDER — VENLAFAXINE HYDROCHLORIDE 37.5 MG/1
CAPSULE, EXTENDED RELEASE ORAL
Qty: 30 CAPSULE | Refills: 1 | Status: SHIPPED | OUTPATIENT
Start: 2021-04-19 | End: 2021-05-21 | Stop reason: SDUPTHER

## 2021-04-30 ENCOUNTER — OFFICE VISIT (OUTPATIENT)
Dept: FAMILY MEDICINE CLINIC | Age: 86
End: 2021-04-30
Payer: MEDICARE

## 2021-04-30 VITALS
SYSTOLIC BLOOD PRESSURE: 120 MMHG | RESPIRATION RATE: 14 BRPM | DIASTOLIC BLOOD PRESSURE: 78 MMHG | OXYGEN SATURATION: 97 % | HEIGHT: 63 IN | TEMPERATURE: 98 F | BODY MASS INDEX: 20.38 KG/M2 | WEIGHT: 115 LBS | HEART RATE: 62 BPM

## 2021-04-30 DIAGNOSIS — F41.9 ANXIETY: ICD-10-CM

## 2021-04-30 DIAGNOSIS — F51.04 CHRONIC INSOMNIA: ICD-10-CM

## 2021-04-30 PROCEDURE — 99213 OFFICE O/P EST LOW 20 MIN: CPT | Performed by: FAMILY MEDICINE

## 2021-04-30 RX ORDER — ALPRAZOLAM 0.5 MG/1
TABLET ORAL
Qty: 14 TABLET | Refills: 0 | Status: CANCELLED | OUTPATIENT
Start: 2021-04-30 | End: 2021-05-30

## 2021-04-30 RX ORDER — ALPRAZOLAM 0.25 MG/1
TABLET ORAL
Qty: 30 TABLET | Refills: 0 | Status: SHIPPED | OUTPATIENT
Start: 2021-04-30 | End: 2021-12-28 | Stop reason: SDUPTHER

## 2021-04-30 NOTE — PROGRESS NOTES
Subjective:      Patient ID: Vinicius Kimbrough is a 81 y.o. female    HPI  Here in follow up for chronic insomnia and anxiety. Taking xanax on occasion which does work well. No weight changes since last time. Has only been using 1/2 tablet  as typical dose of xanax when she does take it at night. .     Review of Systems   Constitutional: Negative for chills and fever. Cardiovascular: Negative for chest pain. Musculoskeletal: Positive for arthralgias. Neurological: Negative for weakness. Controlled substances monitoring: no signs of potential drug abuse or diversion identified and OARRS report reviewed today- activity consistent with treatment plan.   Reviewed allergy, medical, social, surgical, family and med list changes and updated   Files     Social History     Socioeconomic History    Marital status:      Spouse name: None    Number of children: None    Years of education: None    Highest education level: None   Occupational History    None   Social Needs    Financial resource strain: None    Food insecurity     Worry: None     Inability: None    Transportation needs     Medical: None     Non-medical: None   Tobacco Use    Smoking status: Never Smoker    Smokeless tobacco: Never Used   Substance and Sexual Activity    Alcohol use: No    Drug use: No    Sexual activity: None   Lifestyle    Physical activity     Days per week: None     Minutes per session: None    Stress: None   Relationships    Social connections     Talks on phone: None     Gets together: None     Attends Jehovah's witness service: None     Active member of club or organization: None     Attends meetings of clubs or organizations: None     Relationship status: None    Intimate partner violence     Fear of current or ex partner: None     Emotionally abused: None     Physically abused: None     Forced sexual activity: None   Other Topics Concern    None   Social History Narrative    None     Current Outpatient Medications Medication Sig Dispense Refill    venlafaxine (EFFEXOR XR) 37.5 MG extended release capsule TAKE 1 CAPSULE BY MOUTH ONCE DAILY 30 capsule 1    Prenatal Multivit-Min-Fe-FA (PRE-JUANITO PO) Take by mouth      potassium chloride 10 MEQ/100ML Infuse 10 mEq intravenously once      Handicap Placard MISC by Does not apply route X 2 years 1 each 0    FERROUS SULFATE PO Take 65 mg by mouth daily      vitamin B-12 (CYANOCOBALAMIN) 1000 MCG tablet Take 1,000 mcg by mouth daily      Esomeprazole Magnesium (NEXIUM 24HR PO) Take 20 mg by mouth daily        No current facility-administered medications for this visit. Family History   Problem Relation Age of Onset    Heart Disease Mother     Liver Disease Father     Heart Disease Sister      Past Medical History:   Diagnosis Date    Anemia     Environmental allergies     Hypertension      Objective:   /78   Pulse 62   Temp 98 °F (36.7 °C)   Resp 14   Ht 5' 3\" (1.6 m)   Wt 116 lb (52.6 kg)   LMP  (LMP Unknown)   SpO2 97%   BMI 20.55 kg/m²     Physical Exam  Patient with appropriate affect. Alert    Thought content appropriate  Good eye contact    Gen:   NAD  Lungs: clear and equal breath sounds  Heart:   Rate reg. 1/6 syst murmur along llsb  Assessment:       Diagnosis Orders   1. Anxiety  ALPRAZolam (XANAX) 0.5 MG tablet   2.  Chronic insomnia  ALPRAZolam (XANAX) 0.5 MG tablet         Plan:        Orders Placed This Encounter   Medications    ALPRAZolam (XANAX) 0.25 MG tablet     Si po q hs prn insomnia     Dispense:  30 tablet     Refill:  0   f/u in 3 months

## 2021-05-21 RX ORDER — VENLAFAXINE HYDROCHLORIDE 37.5 MG/1
CAPSULE, EXTENDED RELEASE ORAL
Qty: 30 CAPSULE | Refills: 1 | Status: SHIPPED | OUTPATIENT
Start: 2021-05-21 | End: 2021-06-17

## 2021-05-27 ENCOUNTER — TELEPHONE (OUTPATIENT)
Dept: PRIMARY CARE CLINIC | Age: 86
End: 2021-05-27

## 2021-06-17 RX ORDER — VENLAFAXINE HYDROCHLORIDE 37.5 MG/1
CAPSULE, EXTENDED RELEASE ORAL
Qty: 30 CAPSULE | Refills: 1 | Status: SHIPPED | OUTPATIENT
Start: 2021-06-17 | End: 2021-08-15

## 2021-06-17 NOTE — TELEPHONE ENCOUNTER
Future Appointments     Provider Department   [facility]3/2021 [facility]   [facility]   Recent Visits    [facility]2021 [address] [facility] Reynold Snellen, MD

## 2021-08-15 RX ORDER — VENLAFAXINE HYDROCHLORIDE 37.5 MG/1
CAPSULE, EXTENDED RELEASE ORAL
Qty: 30 CAPSULE | Refills: 1 | Status: SHIPPED | OUTPATIENT
Start: 2021-08-15 | End: 2021-10-12

## 2021-08-16 ENCOUNTER — NURSE TRIAGE (OUTPATIENT)
Dept: OTHER | Facility: CLINIC | Age: 86
End: 2021-08-16

## 2021-08-16 NOTE — TELEPHONE ENCOUNTER
Spoke to [place] and she agreed to come to the walk in to be evaluated.
PREGNANCY: \"Is there any chance you are pregnant? \" \"When was your last menstrual period? \"        n/a    Protocols used: [facility]

## 2021-08-23 ENCOUNTER — OFFICE VISIT (OUTPATIENT)
Dept: FAMILY MEDICINE CLINIC | Age: 86
End: 2021-08-23
Payer: MEDICARE

## 2021-08-23 VITALS
SYSTOLIC BLOOD PRESSURE: 126 MMHG | HEIGHT: 63 IN | BODY MASS INDEX: 20.38 KG/M2 | HEART RATE: 83 BPM | DIASTOLIC BLOOD PRESSURE: 80 MMHG | OXYGEN SATURATION: 97 % | WEIGHT: 115 LBS | TEMPERATURE: 97.2 F

## 2021-08-23 DIAGNOSIS — L29.9 PRURITIC DERMATITIS: ICD-10-CM

## 2021-08-23 DIAGNOSIS — L23.7 POISON OAK DERMATITIS: Primary | ICD-10-CM

## 2021-08-23 PROBLEM — L30.8 PRURITIC DERMATITIS: Status: ACTIVE | Noted: 2021-08-23

## 2021-08-23 PROCEDURE — 96372 THER/PROPH/DIAG INJ SC/IM: CPT | Performed by: PHYSICIAN ASSISTANT

## 2021-08-23 PROCEDURE — 99213 OFFICE O/P EST LOW 20 MIN: CPT | Performed by: PHYSICIAN ASSISTANT

## 2021-08-23 RX ORDER — METHYLPREDNISOLONE ACETATE 80 MG/ML
80 INJECTION, SUSPENSION INTRA-ARTICULAR; INTRALESIONAL; INTRAMUSCULAR; SOFT TISSUE ONCE
Status: COMPLETED | OUTPATIENT
Start: 2021-08-23 | End: 2021-08-23

## 2021-08-23 RX ORDER — HYDROXYZINE HYDROCHLORIDE 25 MG/1
25 TABLET, FILM COATED ORAL EVERY 8 HOURS PRN
Qty: 30 TABLET | Refills: 0 | Status: SHIPPED | OUTPATIENT
Start: 2021-08-23 | End: 2021-09-02

## 2021-08-23 RX ORDER — PREDNISONE 10 MG/1
10 TABLET ORAL DAILY
Qty: 10 TABLET | Refills: 0 | Status: SHIPPED | OUTPATIENT
Start: 2021-08-23 | End: 2021-09-02

## 2021-08-23 RX ADMIN — METHYLPREDNISOLONE ACETATE 80 MG: 80 INJECTION, SUSPENSION INTRA-ARTICULAR; INTRALESIONAL; INTRAMUSCULAR; SOFT TISSUE at 17:47

## 2021-08-23 SDOH — ECONOMIC STABILITY: FOOD INSECURITY: WITHIN THE PAST 12 MONTHS, THE FOOD YOU BOUGHT JUST DIDN'T LAST AND YOU DIDN'T HAVE MONEY TO GET MORE.: NEVER TRUE

## 2021-08-23 SDOH — ECONOMIC STABILITY: FOOD INSECURITY: WITHIN THE PAST 12 MONTHS, YOU WORRIED THAT YOUR FOOD WOULD RUN OUT BEFORE YOU GOT MONEY TO BUY MORE.: NEVER TRUE

## 2021-08-23 ASSESSMENT — ENCOUNTER SYMPTOMS
VOMITING: 0
SINUS PRESSURE: 0
DIARRHEA: 0
ABDOMINAL PAIN: 0
TROUBLE SWALLOWING: 0
COUGH: 0
SHORTNESS OF BREATH: 0
BACK PAIN: 0
CHEST TIGHTNESS: 0

## 2021-08-23 ASSESSMENT — SOCIAL DETERMINANTS OF HEALTH (SDOH): HOW HARD IS IT FOR YOU TO PAY FOR THE VERY BASICS LIKE FOOD, HOUSING, MEDICAL CARE, AND HEATING?: NOT HARD AT ALL

## 2021-08-23 NOTE — PROGRESS NOTES
Subjective:      Patient ID: Larisa Varma is a 81 y.o. female who presents today for:  Chief Complaint   Patient presents with   [address] [place] states she has poison oak on her legs, up one arm. Pt states she also has a pin going up the L side of her face, going into her ear. Pt states symptoms have been ongoing since Friday, describing areas as itchy. Pt states she has used OTC cream with minimal relief. HPI  81year old female who presents with poison oak on her bilateral arms legs abdomin and chest. Very itchy. States that it is causing stress    Past Medical History:   Diagnosis Date    Anemia     Environmental allergies     Hypertension      Past Surgical History:   Procedure Laterality Date    COLONOSCOPY  3/10/10    DR Janice Woods    HYSTERECTOMY  1992    NASAL POLYP SURGERY  1980    UPPER GASTROINTESTINAL ENDOSCOPY       Social History     Socioeconomic History    Marital status:      Spouse name: Not on file    Number of children: Not on file    Years of education: Not on file    Highest education level: Not on file   Occupational History    Not on file   Tobacco Use    Smoking status: Never Smoker    Smokeless tobacco: Never Used   Substance and Sexual Activity    Alcohol use: No    Drug use: No    Sexual activity: Not on file   Other Topics Concern    Not on file   Social History Narrative    Not on file     Social Determinants of Health     Financial Resource Strain: Low Risk     Difficulty of Paying Living Expenses: Not hard at all   Food Insecurity: No Food Insecurity    Worried About [address] in the Last Year: Never true    [address]  in the Last Year: Never true   Transportation Needs:     Lack of Transportation (Medical):      Lack of Transportation (Non-Medical):    Physical Activity:     Days of Exercise per Week:     Minutes of Exercise per Session:    Stress:     Feeling of Stress :    Social Connections:     Frequency of Communication with Friends and Family:     Frequency of Social Gatherings with Friends and Family:     Attends Restorationist Services:     Active Member of Clubs or Organizations:     Attends Club or Organization Meetings:     Marital Status:    Intimate Partner Violence:     Fear of Current or Ex-Partner:     Emotionally Abused:     Physically Abused:     Sexually Abused:      Family History   Problem Relation Age of Onset    Heart Disease Mother     Liver Disease Father     Heart Disease Sister      Allergies   Allergen Reactions    Amoxicillin      Current Outpatient Medications   Medication Sig Dispense Refill    predniSONE (DELTASONE) 10 MG tablet Take 1 tablet by mouth daily for 10 days 10 tablet 0    hydrOXYzine (ATARAX) 25 MG tablet Take 1 tablet by mouth every 8 hours as needed for Itching 30 tablet 0    venlafaxine (EFFEXOR XR) 37.5 MG extended release capsule TAKE 1 CAPSULE BY MOUTH ONCE DAILY 30 capsule 1    Prenatal Multivit-Min-Fe-FA (PRE-JUANITO PO) Take by mouth      potassium chloride 10 MEQ/100ML Infuse 10 mEq intravenously once      Handicap Placard MISC by Does not apply route X 2 years 1 each 0    FERROUS SULFATE PO Take 65 mg by mouth daily      vitamin B-12 (CYANOCOBALAMIN) 1000 MCG tablet Take 1,000 mcg by mouth daily      Esomeprazole Magnesium (NEXIUM 24HR PO) Take 20 mg by mouth daily        Current Facility-Administered Medications   Medication Dose Route Frequency Provider Last Rate Last Admin    methylPREDNISolone acetate (DEPO-MEDROL) injection 80 mg  80 mg Intramuscular Once TERE Banda              Review of Systems   Constitutional: Negative for activity change, appetite change, chills, fever and unexpected weight change. HENT: Negative for drooling, ear pain, nosebleeds, sinus pressure and trouble swallowing. Respiratory: Negative for cough, chest tightness and shortness of breath. Cardiovascular: Negative for chest pain and leg swelling. Gastrointestinal: Negative for abdominal pain, diarrhea and vomiting. Endocrine: Negative for polydipsia and polyphagia. Genitourinary: Negative for dysuria, flank pain and frequency. Musculoskeletal: Negative for back pain and myalgias. Skin: Positive for rash. Negative for pallor. Neurological: Negative for syncope, weakness and headaches. Hematological: Does not bruise/bleed easily. Psychiatric/Behavioral: Negative for agitation, behavioral problems and confusion. All other systems reviewed and are negative. Objective:   /80 (Site: Right Upper Arm, Position: Sitting, Cuff Size: Small Adult)   Pulse 83   Temp 97.2 °F (36.2 °C)   Ht 5' 3\" (1.6 m)   Wt 115 lb (52.2 kg)   LMP  (LMP Unknown)   SpO2 97%   BMI 20.37 kg/m²     Physical Exam  Vitals and nursing note reviewed. Constitutional:       General: She is awake. She is not in acute distress. Appearance: Normal appearance. She is well-developed and normal weight. She is not ill-appearing, toxic-appearing or diaphoretic. Comments: No photophobia. No phonophobia. HENT:      Head: Normocephalic and atraumatic. No Varghese's sign. Right Ear: External ear normal.      Left Ear: External ear normal.      Nose: Nose normal. No congestion or rhinorrhea. Mouth/Throat:      Mouth: Mucous membranes are moist.      Pharynx: Oropharynx is clear. No oropharyngeal exudate or posterior oropharyngeal erythema. Eyes:      General: No scleral icterus. Right eye: No foreign body or discharge. Left eye: No discharge. Extraocular Movements: Extraocular movements intact. Conjunctiva/sclera: Conjunctivae normal.      Left eye: No exudate. Pupils: Pupils are equal, round, and reactive to light. Neck:      Vascular: No JVD. Trachea: No tracheal deviation. Comments: No meningismus. Cardiovascular:      Rate and Rhythm: Normal rate and regular rhythm. Heart sounds: Normal heart sounds. Heart sounds not distant. No murmur heard. No friction rub. No gallop. Pulmonary:      Effort: Pulmonary effort is normal. No respiratory distress. Breath sounds: Normal breath sounds. No stridor. No wheezing, rhonchi or rales. Chest:      Chest wall: No tenderness. Abdominal:      General: Abdomen is flat. Bowel sounds are normal. There is no distension. Palpations: Abdomen is soft. There is no mass. Tenderness: There is no abdominal tenderness. There is no right CVA tenderness, left CVA tenderness, guarding or rebound. Hernia: No hernia is present. Musculoskeletal:         General: No swelling, tenderness, deformity or signs of injury. Normal range of motion. Cervical back: Normal range of motion and neck supple. No rigidity. Lymphadenopathy:      Head:      Right side of head: No submental adenopathy. Left side of head: No submental adenopathy. Skin:     General: Skin is warm and dry. Capillary Refill: Capillary refill takes less than 2 seconds. Coloration: Skin is not jaundiced or pale. Findings: Rash present. No bruising, erythema or lesion. Rash is macular. Neurological:      General: No focal deficit present. Mental Status: She is alert and oriented to person, place, and time. Mental status is at baseline. Cranial Nerves: No cranial nerve deficit. Sensory: No sensory deficit. Motor: No weakness. Coordination: Coordination normal.      Deep Tendon Reflexes: Reflexes are normal and symmetric. Psychiatric:         Mood and Affect: Mood normal.         Behavior: Behavior normal. Behavior is cooperative. Thought Content: Thought content normal.         Judgment: Judgment normal.         Assessment:       Diagnosis Orders   1. Poison oak dermatitis  methylPREDNISolone acetate (DEPO-MEDROL) injection 80 mg    predniSONE (DELTASONE) 10 MG tablet    hydrOXYzine (ATARAX) 25 MG tablet   2.  Pruritic dermatitis methylPREDNISolone acetate (DEPO-MEDROL) injection 80 mg    predniSONE (DELTASONE) 10 MG tablet    hydrOXYzine (ATARAX) 25 MG tablet     No results found for this visit on 08/23/21. Plan:     Assessment & Plan   Fatimah was seen today for poison oak. Diagnoses and all orders for this visit:    Poison oak dermatitis  -     methylPREDNISolone acetate (DEPO-MEDROL) injection 80 mg  -     predniSONE (DELTASONE) 10 MG tablet; Take 1 tablet by mouth daily for 10 days  -     hydrOXYzine (ATARAX) 25 MG tablet; Take 1 tablet by mouth every 8 hours as needed for Itching    Pruritic dermatitis  -     methylPREDNISolone acetate (DEPO-MEDROL) injection 80 mg  -     predniSONE (DELTASONE) 10 MG tablet; Take 1 tablet by mouth daily for 10 days  -     hydrOXYzine (ATARAX) 25 MG tablet; Take 1 tablet by mouth every 8 hours as needed for Itching      No orders of the defined types were placed in this encounter. Orders Placed This Encounter   Medications    methylPREDNISolone acetate (DEPO-MEDROL) injection 80 mg    predniSONE (DELTASONE) 10 MG tablet     Sig: Take 1 tablet by mouth daily for 10 days     Dispense:  10 tablet     Refill:  0    hydrOXYzine (ATARAX) 25 MG tablet     Sig: Take 1 tablet by mouth every 8 hours as needed for Itching     Dispense:  30 tablet     Refill:  0     There are no discontinued medications. Return if symptoms worsen or fail to improve. Reviewed with the patient/family: current clinical status & medications. Side effects of the medication prescribed today, as well as treatment plan/rationale and result expectations have been discussed with the patient/family who expresses understanding. Patient will be discharged home in stable condition. Follow up with PCP to evaluate treatment results or return if symptoms worsen or fail to improve. Discussed signs and symptoms which require immediate follow-up in ED/call to 911. Understanding verbalized.      I have reviewed the patient's medical history in detail and updated the computerized patient record.     TERE Wallace

## 2021-08-23 NOTE — PATIENT INSTRUCTIONS
Patient Education        Poison [place], [place], and [place]: Care Instructions  Your Care Instructions     Poison ivy, poison oak, and poison sumac are plants that can cause a skin rash upon contact. The red, itchy rash often shows up in lines or streaks and may cause fluid-filled blisters or large, raised hives. The rash is caused by an allergic reaction to an oil in poison ivy, oak, and sumac. The rash may occur when you touch the plant or when you touch clothing, pet fur, sporting gear, gardening tools, or other objects that have come in contact with one of these plants. You cannot catch or spread the rash, even if you touch it or the blister fluid, because the plant oil will already have been absorbed or washed off the skin. The rash may seem to be spreading, but either it is still developing from earlier contact or you have touched something that still has the plant oil on it. Follow-up care is a key part of your treatment and safety. Be sure to make and go to all appointments, and call your doctor if you are having problems. It's also a good idea to know your test results and keep a list of the medicines you take. How can you care for yourself at home? · If your doctor prescribed a cream, use it as directed. If your doctor prescribed medicine, take it exactly as prescribed. Call your doctor if you think you are having a problem with your medicine. · Use cold, wet cloths to reduce itching. · Keep cool, and stay out of the sun. · Leave the rash open to the air. · Wash all clothing or other things that may have come in contact with the plant oil. · Avoid most lotions and ointments until the rash heals. Calamine lotion may help relieve symptoms of a plant rash. Use it 3 or 4 times a day. To prevent poison ivy exposure  If you know that you will be near poison ivy, oak, or sumac, you can try these options:  · Use a product designed to help prevent plant oil from getting on the skin.  These products, such as [place] X Pre-Contact Skin Solution, come in lotions, sprays, or towelettes. You put the product on your skin right before you go outdoors. · If you did not use a preventive product and you have had contact with plant oil, clean it off your skin as soon as possible. Use a product such as [employer]ecnu Original Outdoor Skin Cleanser. These products can also be used to clean plant oil from clothing or tools. When should you call for help? Call your doctor now or seek immediate medical care if:    · Your rash gets worse, and you start to feel bad and have a fever, a stiff neck, nausea, and vomiting.     · You have signs of infection, such as:  ? Increased pain, swelling, warmth, or redness. ? Red streaks leading from the rash. ? Pus draining from the rash. ? A fever. Watch closely for changes in your health, and be sure to contact your doctor if:    · You have new blisters or bruises, or the rash spreads and looks like a sunburn.     · The rash gets worse, or it comes back after nearly disappearing.     · You think a medicine you are using is making your rash worse.     · Your rash does not clear up after 1 to 2 weeks of home treatment.     · You have joint aches or body aches with your rash. Where can you learn more? Go to [email or web address] and sign in to your [employer] account. Enter P869 in the [facility] box to learn more about \"Poison [place], [place], and [place]: Care Instructions. \"     If you do not have an account, please click on the \"Sign Up Now\" link. Current as of: March 3, 2021               Content Version: 12.9  © 7639-9054 [employer]. Care instructions adapted under license by [facility] ([facility]). If you have questions about a medical condition or this instruction, always ask your healthcare professional. [place] [address] any warranty or liability for your use of this information.

## 2021-10-12 RX ORDER — VENLAFAXINE HYDROCHLORIDE 37.5 MG/1
CAPSULE, EXTENDED RELEASE ORAL
Qty: 30 CAPSULE | Refills: 1 | Status: SHIPPED | OUTPATIENT
Start: 2021-10-12 | End: 2021-12-20 | Stop reason: SDUPTHER

## 2021-12-20 RX ORDER — VENLAFAXINE HYDROCHLORIDE 37.5 MG/1
37.5 CAPSULE, EXTENDED RELEASE ORAL DAILY
Qty: 30 CAPSULE | Refills: 0 | Status: SHIPPED | OUTPATIENT
Start: 2021-12-20 | End: 2021-12-28 | Stop reason: SDUPTHER

## 2021-12-20 NOTE — TELEPHONE ENCOUNTER
----- Message from Briana Hidalgo sent at 12/17/2021 12:36 PM EST -----  Subject: Refill Request    QUESTIONS  Name of Medication? venlafaxine (EFFEXOR XR) 37.5 MG extended release   capsule  Patient-reported dosage and instructions? Once daily  How many days do you have left? 1  Preferred Pharmacy? [id number] [address] [place] [address]. Pharmacy phone number (if available)? [phone]  ---------------------------------------------------------------------------  --------------  Rakesh WILEY  What is the best way for the office to contact you? Do not leave any   message, patient will call back for answer  Preferred Call Back Phone Number?  [phone]

## 2021-12-28 ENCOUNTER — OFFICE VISIT (OUTPATIENT)
Dept: FAMILY MEDICINE CLINIC | Age: 86
End: 2021-12-28
Payer: MEDICARE

## 2021-12-28 VITALS
SYSTOLIC BLOOD PRESSURE: 122 MMHG | DIASTOLIC BLOOD PRESSURE: 72 MMHG | WEIGHT: 115 LBS | TEMPERATURE: 97 F | HEART RATE: 61 BPM | OXYGEN SATURATION: 100 % | BODY MASS INDEX: 20.38 KG/M2 | HEIGHT: 63 IN

## 2021-12-28 DIAGNOSIS — F51.04 CHRONIC INSOMNIA: ICD-10-CM

## 2021-12-28 DIAGNOSIS — G89.29 CHRONIC RIGHT SHOULDER PAIN: ICD-10-CM

## 2021-12-28 DIAGNOSIS — H61.22 IMPACTED CERUMEN OF LEFT EAR: ICD-10-CM

## 2021-12-28 DIAGNOSIS — M25.511 CHRONIC RIGHT SHOULDER PAIN: ICD-10-CM

## 2021-12-28 DIAGNOSIS — F41.9 ANXIETY: Primary | ICD-10-CM

## 2021-12-28 PROCEDURE — 99214 OFFICE O/P EST MOD 30 MIN: CPT | Performed by: FAMILY MEDICINE

## 2021-12-28 RX ORDER — ALPRAZOLAM 0.25 MG/1
TABLET ORAL
Qty: 30 TABLET | Refills: 0 | Status: SHIPPED | OUTPATIENT
Start: 2021-12-28 | End: 2022-09-30 | Stop reason: SDUPTHER

## 2021-12-28 RX ORDER — VENLAFAXINE HYDROCHLORIDE 37.5 MG/1
37.5 CAPSULE, EXTENDED RELEASE ORAL 2 TIMES DAILY
Qty: 60 CAPSULE | Refills: 1 | Status: SHIPPED | OUTPATIENT
Start: 2021-12-28 | End: 2022-03-21 | Stop reason: SDUPTHER

## 2021-12-28 ASSESSMENT — ENCOUNTER SYMPTOMS
COUGH: 0
VOMITING: 0
DIARRHEA: 0

## 2022-01-03 NOTE — TELEPHONE ENCOUNTER
----- Message from Peter Baez sent at 1/3/2022 12:49 PM EST -----  Subject: Message to Provider    QUESTIONS  Information for Provider? Pt forgot to ask if can get Handicap sticker,   got a Referral for therapy with her Right arm, if she can get that number   again ?   ---------------------------------------------------------------------------  --------------  CALL BACK INFO  What is the best way for the office to contact you? OK to leave message on   voicemail  Preferred Call Back Phone Number? [phone]  ---------------------------------------------------------------------------  --------------  SCRIPT ANSWERS  Relationship to Patient?  Self

## 2022-01-11 NOTE — TELEPHONE ENCOUNTER
----- Message from Jose R Gr MA sent at 1/11/2022  1:41 PM EST -----  Subject: Message to Provider    QUESTIONS  Information for Provider? Needs letter from MD for handicapped placard. call when is ready. ---------------------------------------------------------------------------  --------------  Geoffrey Danger INFO  What is the best way for the office to contact you? OK to leave message on   voicemail  Preferred Call Back Phone Number? [phone]  ---------------------------------------------------------------------------  --------------  SCRIPT ANSWERS  Relationship to Patient?  Self

## 2022-01-12 DIAGNOSIS — M17.0 OSTEOARTHRITIS OF BOTH KNEES, UNSPECIFIED OSTEOARTHRITIS TYPE: ICD-10-CM

## 2022-03-21 RX ORDER — VENLAFAXINE HYDROCHLORIDE 37.5 MG/1
37.5 CAPSULE, EXTENDED RELEASE ORAL 2 TIMES DAILY
Qty: 60 CAPSULE | Refills: 1 | Status: SHIPPED | OUTPATIENT
Start: 2022-03-21 | End: 2022-05-20 | Stop reason: SDUPTHER

## 2022-03-21 NOTE — TELEPHONE ENCOUNTER
Rx request   Requested Prescriptions     Pending Prescriptions Disp Refills    venlafaxine (EFFEXOR XR) 37.5 MG extended release capsule 60 capsule 1     Sig: Take 1 capsule by mouth 2 times daily     LOV 12/28/2021  Next Visit Date:  No future appointments.

## 2022-04-13 ENCOUNTER — TELEPHONE (OUTPATIENT)
Dept: FAMILY MEDICINE CLINIC | Age: 87
End: 2022-04-13

## 2022-04-13 DIAGNOSIS — G89.29 CHRONIC RIGHT SHOULDER PAIN: Primary | ICD-10-CM

## 2022-04-13 DIAGNOSIS — M25.511 CHRONIC RIGHT SHOULDER PAIN: Primary | ICD-10-CM

## 2022-04-13 NOTE — TELEPHONE ENCOUNTER
----- Message from Enrrique [id number] sent at 4/12/2022  2:19 PM EDT -----  Subject: Message to Provider    QUESTIONS  Information for Provider? Patient called she has a fax number to send the   referral for [facility] fax number is [phone] and phone   number is [phone]. Patient would like a call back. ---------------------------------------------------------------------------  --------------  Pettis Sureshon INFO  What is the best way for the office to contact you? OK to leave message on   voicemail  Preferred Call Back Phone Number? [phone]  ---------------------------------------------------------------------------  --------------  SCRIPT ANSWERS  Relationship to Patient?  Self

## 2022-05-20 RX ORDER — VENLAFAXINE HYDROCHLORIDE 37.5 MG/1
37.5 CAPSULE, EXTENDED RELEASE ORAL 2 TIMES DAILY
Qty: 60 CAPSULE | Refills: 0 | Status: SHIPPED | OUTPATIENT
Start: 2022-05-20 | End: 2022-07-19 | Stop reason: SDUPTHER

## 2022-05-25 NOTE — TELEPHONE ENCOUNTER
Patient called because she picked up her refill of venlafaxine and accidentally threw it in the garbage. She is asking for a new script, she is going to run out of the medication.     Please approve or deny this request.    Rx requested:  Requested Prescriptions     Pending Prescriptions Disp Refills    venlafaxine (EFFEXOR XR) 37.5 MG extended release capsule 30 capsule 1         Last Office Visit:   4/30/2021      Next Visit Date:  Future Appointments   Date Time Provider Latosha Bull   8/3/2021  2:00 PM Yuki Campbell  [place], [address] No Repair - Repaired With Adjacent Surgical Defect Text (Leave Blank If You Do Not Want): After obtaining clear surgical margins the defect was repaired concurrently with another surgical defect which was in close approximation.

## 2022-06-12 ENCOUNTER — TELEPHONE (OUTPATIENT)
Dept: FAMILY MEDICINE CLINIC | Age: 87
End: 2022-06-12

## 2022-06-12 NOTE — TELEPHONE ENCOUNTER
Called to complete medicare AWV. Pt declined, she also states she needs to cancel her upcomming appt due to hospice coming on the same day, she did not want to convert to a telephone visit and she states she will call to re schedule.

## 2022-07-19 ENCOUNTER — TELEPHONE (OUTPATIENT)
Dept: FAMILY MEDICINE CLINIC | Age: 87
End: 2022-07-19

## 2022-07-19 RX ORDER — VENLAFAXINE HYDROCHLORIDE 37.5 MG/1
37.5 CAPSULE, EXTENDED RELEASE ORAL 2 TIMES DAILY
Qty: 60 CAPSULE | Refills: 0 | Status: SHIPPED | OUTPATIENT
Start: 2022-07-19 | End: 2022-09-19 | Stop reason: SDUPTHER

## 2022-07-19 NOTE — TELEPHONE ENCOUNTER
----- Message from Unk Race sent at 7/19/2022  1:13 PM EDT -----  Subject: Refill Request    QUESTIONS  Name of Medication? venlafaxine (EFFEXOR XR) 37.5 MG extended release   capsule  Patient-reported dosage and instructions? 1 day  How many days do you have left? 2  Preferred Pharmacy? [id number]  Pharmacy phone number (if available)? [phone]  ---------------------------------------------------------------------------  --------------  Ethel WILEY  What is the best way for the office to contact you? OK to leave message on   voicemail  Preferred Call Back Phone Number? [phone]  ---------------------------------------------------------------------------  --------------  SCRIPT ANSWERS  Relationship to Patient?  Self

## 2022-09-19 ENCOUNTER — TELEPHONE (OUTPATIENT)
Dept: FAMILY MEDICINE CLINIC | Age: 87
End: 2022-09-19

## 2022-09-19 RX ORDER — VENLAFAXINE HYDROCHLORIDE 37.5 MG/1
37.5 CAPSULE, EXTENDED RELEASE ORAL 2 TIMES DAILY
Qty: 60 CAPSULE | Refills: 0 | Status: SHIPPED | OUTPATIENT
Start: 2022-09-19 | End: 2022-09-30 | Stop reason: SDUPTHER

## 2022-09-19 NOTE — TELEPHONE ENCOUNTER
----- Message from Kevin Jose sent at 9/19/2022  1:07 PM EDT -----  Subject: Refill Request    QUESTIONS  Name of Medication? venlafaxine (EFFEXOR XR) 37.5 MG extended release   capsule  Patient-reported dosage and instructions? pt takes 1 a day  How many days do you have left? 0  Preferred Pharmacy? [address] [place] [address] [id number]  Pharmacy phone number (if available)? [phone]  Additional Information for Provider? Pt has a follow up appt scheduled for   9/30, which was the first available afternoon appt in office. She is   asking for just enough to get through until the 30th.   ---------------------------------------------------------------------------  --------------  [employer]  What is the best way for the office to contact you? OK to leave message on   voicemail  Preferred Call Back Phone Number? [phone]  ---------------------------------------------------------------------------  --------------  SCRIPT ANSWERS  Relationship to Patient?  Self

## 2022-09-19 NOTE — TELEPHONE ENCOUNTER
Requested Prescriptions     Pending Prescriptions Disp Refills    venlafaxine (EFFEXOR XR) 37.5 MG extended release capsule 60 capsule 0     Sig: Take 1 capsule by mouth in the morning and 1 capsule in the evening.

## 2022-09-30 ENCOUNTER — OFFICE VISIT (OUTPATIENT)
Dept: FAMILY MEDICINE CLINIC | Age: 87
End: 2022-09-30
Payer: MEDICARE

## 2022-09-30 VITALS
DIASTOLIC BLOOD PRESSURE: 82 MMHG | OXYGEN SATURATION: 98 % | WEIGHT: 117 LBS | BODY MASS INDEX: 20.73 KG/M2 | HEIGHT: 63 IN | SYSTOLIC BLOOD PRESSURE: 130 MMHG | TEMPERATURE: 97.4 F | RESPIRATION RATE: 14 BRPM | HEART RATE: 87 BPM

## 2022-09-30 DIAGNOSIS — F51.04 CHRONIC INSOMNIA: ICD-10-CM

## 2022-09-30 DIAGNOSIS — F41.8 MIXED ANXIETY AND DEPRESSIVE DISORDER: Primary | ICD-10-CM

## 2022-09-30 DIAGNOSIS — N18.4 CKD (CHRONIC KIDNEY DISEASE) STAGE 4, GFR 15-29 ML/MIN (HCC): ICD-10-CM

## 2022-09-30 PROCEDURE — 3288F FALL RISK ASSESSMENT DOCD: CPT | Performed by: FAMILY MEDICINE

## 2022-09-30 PROCEDURE — 1123F ACP DISCUSS/DSCN MKR DOCD: CPT | Performed by: FAMILY MEDICINE

## 2022-09-30 PROCEDURE — 99213 OFFICE O/P EST LOW 20 MIN: CPT | Performed by: FAMILY MEDICINE

## 2022-09-30 RX ORDER — VENLAFAXINE HYDROCHLORIDE 37.5 MG/1
37.5 CAPSULE, EXTENDED RELEASE ORAL 2 TIMES DAILY
Qty: 60 CAPSULE | Refills: 1 | Status: SHIPPED | OUTPATIENT
Start: 2022-09-30

## 2022-09-30 RX ORDER — ALPRAZOLAM 0.25 MG/1
TABLET ORAL
Qty: 30 TABLET | Refills: 0 | Status: SHIPPED | OUTPATIENT
Start: 2022-09-30 | End: 2022-10-30

## 2022-09-30 SDOH — ECONOMIC STABILITY: FOOD INSECURITY: WITHIN THE PAST 12 MONTHS, YOU WORRIED THAT YOUR FOOD WOULD RUN OUT BEFORE YOU GOT MONEY TO BUY MORE.: NEVER TRUE

## 2022-09-30 SDOH — ECONOMIC STABILITY: FOOD INSECURITY: WITHIN THE PAST 12 MONTHS, THE FOOD YOU BOUGHT JUST DIDN'T LAST AND YOU DIDN'T HAVE MONEY TO GET MORE.: NEVER TRUE

## 2022-09-30 ASSESSMENT — ENCOUNTER SYMPTOMS: COUGH: 0

## 2022-09-30 ASSESSMENT — PATIENT HEALTH QUESTIONNAIRE - PHQ9
SUM OF ALL RESPONSES TO PHQ QUESTIONS 1-9: 0
SUM OF ALL RESPONSES TO PHQ9 QUESTIONS 1 & 2: 0
SUM OF ALL RESPONSES TO PHQ QUESTIONS 1-9: 0
2. FEELING DOWN, DEPRESSED OR HOPELESS: 0
1. LITTLE INTEREST OR PLEASURE IN DOING THINGS: 0
SUM OF ALL RESPONSES TO PHQ QUESTIONS 1-9: 0
SUM OF ALL RESPONSES TO PHQ QUESTIONS 1-9: 0

## 2022-09-30 ASSESSMENT — SOCIAL DETERMINANTS OF HEALTH (SDOH): HOW HARD IS IT FOR YOU TO PAY FOR THE VERY BASICS LIKE FOOD, HOUSING, MEDICAL CARE, AND HEATING?: NOT HARD AT ALL

## 2022-09-30 NOTE — PROGRESS NOTES
Subjective:      Patient ID: Dinorah Brewer is a 81 y.o. female    Anxiety  Presents for follow-up visit. Here in follow up for anxiety and depression and chronic insomnia. Nereida Chow Has felt more stressed recently related to husbands deteriorating health status. Not taking effexor as written-only taking once daily. Was wondering about getting referral to [facility]. Using xanax periodically for sleep. Review of Systems   Constitutional:  Negative for chills and fever. Respiratory:  Negative for cough. Neurological:  Negative for weakness. Reviewed allergy, medical, social, surgical, family and med list changes and updated   Files   Controlled substances monitoring: no signs of potential drug abuse or diversion identified and OARRS report reviewed today- activity consistent with treatment plan. Social History     Socioeconomic History    Marital status:      Spouse name: None    Number of children: None    Years of education: None    Highest education level: None   Tobacco Use    Smoking status: Never    Smokeless tobacco: Never   Substance and Sexual Activity    Alcohol use: No    Drug use: No     Social Determinants of Health     Financial Resource Strain: Low Risk     Difficulty of Paying Living Expenses: Not hard at all   Food Insecurity: No Food Insecurity    Worried About Running Out of Food in the Last Year: Never true    Ran Out of Food in the Last Year: Never true     Current Outpatient Medications   Medication Sig Dispense Refill    venlafaxine (EFFEXOR XR) 37.5 MG extended release capsule Take 1 capsule by mouth in the morning and 1 capsule in the evening.  60 capsule 0    Handicap Placard MISC by Does not apply route Duration  2 years 1 each 0    Handicap Placard MISC by Does not apply route 1 each 0    Prenatal Multivit-Min-Fe-FA (PRE- PO) Take by mouth      potassium chloride 10 MEQ/100ML Infuse 10 mEq intravenously once      FERROUS SULFATE PO Take 65 mg by mouth daily vitamin B-12 (CYANOCOBALAMIN) 1000 MCG tablet Take 1,000 mcg by mouth daily      Esomeprazole Magnesium (NEXIUM 24HR PO) Take 20 mg by mouth daily        No current facility-administered medications for this visit. Family History   Problem Relation Age of Onset    Heart Disease Mother     Liver Disease Father     Heart Disease Sister      Past Medical History:   Diagnosis Date    Anemia     Environmental allergies     Hypertension      Objective:   /82   Pulse 87   Temp 97.4 °F (36.3 °C)   Resp 14   Ht 5' 3\" (1.6 m)   Wt 117 lb (53.1 kg)   LMP  (LMP Unknown)   SpO2 98%   BMI 20.73 kg/m²     Physical Exam  Patient with appropriate affect. --tearful at times    Alert   Thought content appropriate  Good eye contact    Gen:   NAD  Lungs: clear and equal breath sounds  Heart:   Rate reg. No murmur   Assessment:          Diagnosis Orders   1. Mixed anxiety and depressive disorder        2. Chronic insomnia        3. CKD (chronic kidney disease) stage 4, GFR 15-29 ml/min ([facility])               Plan:      Orders Placed This Encounter   Medications    venlafaxine (EFFEXOR XR) 37.5 MG extended release capsule     Sig: Take 1 capsule by mouth in the morning and 1 capsule in the evening.      Dispense:  60 capsule     Refill:  1    ALPRAZolam (XANAX) 0.25 MG tablet     Si po q hs prn insomnia     Dispense:  30 tablet     Refill:  0      Orders Placed This Encounter   Procedures    External Referral to Psychiatry     Referral Priority:   Routine     Referral Type:   Eval and Treat     Referral Reason:   Specialty Services Required     Requested Specialty:   ADULT HEALTH     Number of Visits Requested:   1    JOHN - Malini Perrin MD, Nephrology, [facility]     Referral Priority:   Routine     Referral Type:   Eval and Treat     Referral Reason:   Specialty Services Required     Referred to Provider:   Lupillo Keating MD     Requested Specialty:   Nephrology     Number of Visits Requested:   1      Ckd stable and followed by nephrology -has not seen in a year-will re do referral   F/u in 6 weeks if not seen by brent by then

## 2022-10-03 DIAGNOSIS — M17.0 OSTEOARTHRITIS OF BOTH KNEES, UNSPECIFIED OSTEOARTHRITIS TYPE: Primary | ICD-10-CM

## 2022-11-14 ENCOUNTER — OFFICE VISIT (OUTPATIENT)
Dept: FAMILY MEDICINE CLINIC | Age: 87
End: 2022-11-14
Payer: MEDICARE

## 2022-11-14 VITALS
TEMPERATURE: 97.6 F | BODY MASS INDEX: 20.73 KG/M2 | OXYGEN SATURATION: 98 % | HEIGHT: 63 IN | RESPIRATION RATE: 14 BRPM | SYSTOLIC BLOOD PRESSURE: 130 MMHG | HEART RATE: 88 BPM | DIASTOLIC BLOOD PRESSURE: 80 MMHG

## 2022-11-14 DIAGNOSIS — M17.0 OSTEOARTHRITIS OF BOTH KNEES, UNSPECIFIED OSTEOARTHRITIS TYPE: Primary | ICD-10-CM

## 2022-11-14 DIAGNOSIS — F51.04 CHRONIC INSOMNIA: ICD-10-CM

## 2022-11-14 DIAGNOSIS — F41.8 MIXED ANXIETY AND DEPRESSIVE DISORDER: ICD-10-CM

## 2022-11-14 PROCEDURE — 1123F ACP DISCUSS/DSCN MKR DOCD: CPT | Performed by: FAMILY MEDICINE

## 2022-11-14 PROCEDURE — 99213 OFFICE O/P EST LOW 20 MIN: CPT | Performed by: FAMILY MEDICINE

## 2022-11-14 RX ORDER — VENLAFAXINE HYDROCHLORIDE 37.5 MG/1
37.5 CAPSULE, EXTENDED RELEASE ORAL 2 TIMES DAILY
Qty: 60 CAPSULE | Refills: 2 | Status: SHIPPED | OUTPATIENT
Start: 2022-11-14

## 2022-11-14 ASSESSMENT — ENCOUNTER SYMPTOMS
COUGH: 0
NAUSEA: 0
VOMITING: 0

## 2022-11-14 ASSESSMENT — PATIENT HEALTH QUESTIONNAIRE - PHQ9
1. LITTLE INTEREST OR PLEASURE IN DOING THINGS: 1
SUM OF ALL RESPONSES TO PHQ QUESTIONS 1-9: 2
2. FEELING DOWN, DEPRESSED OR HOPELESS: 1
SUM OF ALL RESPONSES TO PHQ QUESTIONS 1-9: 2
SUM OF ALL RESPONSES TO PHQ9 QUESTIONS 1 & 2: 2
SUM OF ALL RESPONSES TO PHQ QUESTIONS 1-9: 2
SUM OF ALL RESPONSES TO PHQ QUESTIONS 1-9: 2

## 2022-11-14 NOTE — PROGRESS NOTES
Subjective:      Patient ID: Nancy Joy is a 81 y.o. female    Mental Health Problem  This is a chronic problem. The onset of the illness is precipitated by emotional stress. Here in follow up for anxiety and depression. And chronic insomnia. Does feel better with increasing dose of effexor. Tolerating effexor well from last appt. Does have appt with renal .    Still in process of getting appt with psychiatry. Requesting referral to ortho for chronic bilateral knee arthritis        Review of Systems   Constitutional:  Negative for chills and fever. Respiratory:  Negative for cough. Gastrointestinal:  Negative for nausea and vomiting. Skin:  Negative for rash. Neurological:  Negative for weakness. Reviewed allergy, medical, social, surgical, family and med list changes and updated   Files     Social History     Socioeconomic History    Marital status:      Spouse name: None    Number of children: None    Years of education: None    Highest education level: None   Tobacco Use    Smoking status: Never    Smokeless tobacco: Never   Substance and Sexual Activity    Alcohol use: No    Drug use: No     Social Determinants of Health     Financial Resource Strain: Low Risk     Difficulty of Paying Living Expenses: Not hard at all   Food Insecurity: No Food Insecurity    Worried About Running Out of Food in the Last Year: Never true    Ran Out of Food in the Last Year: Never true     Current Outpatient Medications   Medication Sig Dispense Refill    Handicap Placard MISC by Does not apply route Starting 10-3-22 to 10-3-24 1 each 0    venlafaxine (EFFEXOR XR) 37.5 MG extended release capsule Take 1 capsule by mouth in the morning and 1 capsule in the evening.  60 capsule 1    Handicap Placard MISC by Does not apply route Duration  2 years 1 each 0    Handicap Placard MISC by Does not apply route 1 each 0    Prenatal Multivit-Min-Fe-FA (PRE-JUANITO PO) Take by mouth      potassium chloride 10 MEQ/100ML Infuse 10 mEq intravenously once      FERROUS SULFATE PO Take 65 mg by mouth daily      vitamin B-12 (CYANOCOBALAMIN) 1000 MCG tablet Take 1,000 mcg by mouth daily      Esomeprazole Magnesium (NEXIUM 24HR PO) Take 20 mg by mouth daily        No current facility-administered medications for this visit. Family History   Problem Relation Age of Onset    Heart Disease Mother     Liver Disease Father     Heart Disease Sister      Past Medical History:   Diagnosis Date    Anemia     Environmental allergies     Hypertension    Controlled substances monitoring: no signs of potential drug abuse or diversion identified and OARRS report reviewed today- activity consistent with treatment plan. Objective:   /80   Pulse 88   Temp 97.6 °F (36.4 °C)   Resp 14   Ht 5' 3\" (1.6 m)   LMP  (LMP Unknown)   SpO2 98%   BMI 20.73 kg/m²     Physical Exam  Patient with appropriate affect. Alert   Thought content appropriate  Good eye contact    Gen:   NAD    Assessment:       Diagnosis Orders   1. Mixed anxiety and depressive disorder        2. Chronic insomnia        3. Osteoarthritis of both knees, unspecified osteoarthritis type              Plan:     Orders Placed This Encounter   Procedures    [address] MD, Orthopaedic Surgery - [place]     Referral Priority:   Routine     Referral Type:   Eval and Treat     Referral Reason:   Specialty Services Required     Referred to Provider:   Gaetano Essex, MD     Requested Specialty:   Orthopedic Surgery     Number of Visits Requested:   1      Orders Placed This Encounter   Medications    venlafaxine (EFFEXOR XR) 37.5 MG extended release capsule     Sig: Take 1 capsule by mouth in the morning and 1 capsule in the evening.      Dispense:  60 capsule     Refill:  2      Continue current medications   F/u in 3 months

## 2022-12-19 ENCOUNTER — OFFICE VISIT (OUTPATIENT)
Dept: ORTHOPEDIC SURGERY | Age: 87
End: 2022-12-19
Payer: MEDICARE

## 2022-12-19 DIAGNOSIS — M25.562 LEFT KNEE PAIN, UNSPECIFIED CHRONICITY: ICD-10-CM

## 2022-12-19 DIAGNOSIS — M17.0 PRIMARY OSTEOARTHRITIS OF BOTH KNEES: Primary | ICD-10-CM

## 2022-12-19 DIAGNOSIS — M25.561 RIGHT KNEE PAIN, UNSPECIFIED CHRONICITY: ICD-10-CM

## 2022-12-19 PROCEDURE — 99204 OFFICE O/P NEW MOD 45 MIN: CPT | Performed by: STUDENT IN AN ORGANIZED HEALTH CARE EDUCATION/TRAINING PROGRAM

## 2022-12-19 PROCEDURE — 20610 DRAIN/INJ JOINT/BURSA W/O US: CPT | Performed by: STUDENT IN AN ORGANIZED HEALTH CARE EDUCATION/TRAINING PROGRAM

## 2022-12-19 PROCEDURE — 1123F ACP DISCUSS/DSCN MKR DOCD: CPT | Performed by: STUDENT IN AN ORGANIZED HEALTH CARE EDUCATION/TRAINING PROGRAM

## 2022-12-19 RX ORDER — TRIAMCINOLONE ACETONIDE 40 MG/ML
40 INJECTION, SUSPENSION INTRA-ARTICULAR; INTRAMUSCULAR ONCE
Status: COMPLETED | OUTPATIENT
Start: 2022-12-19 | End: 2022-12-19

## 2022-12-19 RX ORDER — LIDOCAINE HYDROCHLORIDE 10 MG/ML
5 INJECTION, SOLUTION INFILTRATION; PERINEURAL ONCE
Status: COMPLETED | OUTPATIENT
Start: 2022-12-19 | End: 2022-12-19

## 2022-12-19 RX ADMIN — TRIAMCINOLONE ACETONIDE 40 MG: 40 INJECTION, SUSPENSION INTRA-ARTICULAR; INTRAMUSCULAR at 15:03

## 2022-12-19 RX ADMIN — LIDOCAINE HYDROCHLORIDE 5 ML: 10 INJECTION, SOLUTION INFILTRATION; PERINEURAL at 15:02

## 2022-12-19 RX ADMIN — LIDOCAINE HYDROCHLORIDE 5 ML: 10 INJECTION, SOLUTION INFILTRATION; PERINEURAL at 15:01

## 2022-12-19 RX ADMIN — TRIAMCINOLONE ACETONIDE 40 MG: 40 INJECTION, SUSPENSION INTRA-ARTICULAR; INTRAMUSCULAR at 15:02

## 2022-12-19 ASSESSMENT — ENCOUNTER SYMPTOMS
ALLERGIC/IMMUNOLOGIC NEGATIVE: 1
EYES NEGATIVE: 1
RESPIRATORY NEGATIVE: 1

## 2022-12-19 NOTE — PROGRESS NOTES
Orthopedic Surgery and Sports Medicine    Subjective:      Patient ID: Damon Lenz is a 81 y.o. female who presents today for:  Chief Complaint   Patient presents with    New Patient     Here for exam of Bilateral Knee pain, ongoing for 6 months, x-rays in Epic       HPI  Katerina Sportsman is a 71-year-old female who presents today for evaluation of her bilateral knee pain. She reports pain in the bilateral knees for greater than 6 months. She denies any injury. Pain is located more along the anterior portion of the knee. She reports crepitus in the knees. Denies swelling in the knees. Pain is worse with ambulation and with going up and down the stairs. She states that she sometimes has to go down the stairs sitting on her buttocks because of the pain. She has not had any treatment for her knees. 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 Previous treatment: None  NSAIDs: No  Physical therapy: No      Occupation: Retired  Workers Compensation:   Have you missed work for this issue? No  Is this issue being addressed under a worker's compensation claim?  No    Past Medical History:   Diagnosis Date    Anemia     Environmental allergies     Hypertension       Past Surgical History:   Procedure Laterality Date    COLONOSCOPY  3/10/10    DR CASTILLO    HYSTERECTOMY (CERVIX STATUS UNKNOWN)  1992    NASAL POLYP SURGERY  1980    UPPER GASTROINTESTINAL ENDOSCOPY       Social History     Socioeconomic History    Marital status:      Spouse name: Not on file    Number of children: Not on file    Years of education: Not on file    Highest education level: Not on file   Occupational History    Not on file   Tobacco Use    Smoking status: Never    Smokeless tobacco: Never   Substance and Sexual Activity    Alcohol use: No    Drug use: No    Sexual activity: Not on file   Other Topics Concern    Not on file   Social History Narrative    Not on file     Social Determinants of Health     Financial Resource Strain: Low Risk     Difficulty of Paying Living Expenses: Not hard at all   Food Insecurity: No Food Insecurity    Worried About Running Out of Food in the Last Year: Never true    Ran Out of Food in the Last Year: Never true   Transportation Needs: Not on file   Physical Activity: Not on file   Stress: Not on file   Social Connections: Not on file   Intimate Partner Violence: Not on file   Housing Stability: Not on file     Family History   Problem Relation Age of Onset    Heart Disease Mother     Liver Disease Father     Heart Disease Sister      Allergies   Allergen Reactions    Amoxicillin      Current Outpatient Medications on File Prior to Visit   Medication Sig Dispense Refill    venlafaxine (EFFEXOR XR) 37.5 MG extended release capsule Take 1 capsule by mouth in the morning and 1 capsule in the evening. 60 capsule 2    Handicap Placard MISC by Does not apply route Start 22 to 24 1 each 0    Handicap Placard MISC by Does not apply route Starting 10-3-22 to 10-3-24 1 each 0    Handicap Placard MISC by Does not apply route Duration  2 years 1 each 0    Handicap Placard MISC by Does not apply route 1 each 0    Prenatal Multivit-Min-Fe-FA (PRE- PO) Take by mouth      potassium chloride 10 MEQ/100ML Infuse 10 mEq intravenously once      Esomeprazole Magnesium (NEXIUM 24HR PO) Take 20 mg by mouth daily       FERROUS SULFATE PO Take 65 mg by mouth daily (Patient not taking: Reported on 2022)      vitamin B-12 (CYANOCOBALAMIN) 1000 MCG tablet Take 1,000 mcg by mouth daily (Patient not taking: Reported on 2022)       No current facility-administered medications on file prior to visit. Review of Systems   Constitutional: Negative. HENT: Negative. Eyes: Negative. Respiratory: Negative. Endocrine: Negative. Genitourinary: Negative. Musculoskeletal:  Positive for arthralgias and myalgias. Skin: Negative. Allergic/Immunologic: Negative. Neurological: Negative. Hematological: Negative. Psychiatric/Behavioral: Negative. Objective:   LMP  (LMP Unknown)     Physical Exam:  Ortho Exam    Physical exam of the bilateral knee:  Gait: normal  Alignment: neutral.  Effusion: Trace  There is not medial joint line tenderness. There is not lateral joint line tenderness. Patellar grind test: Positive  Patellar apprehension: negative  Patellar mobility: 1 quadrant  Quadriceps atrophy: Absent . Quadriceps strength: 5/5. Knee range of motion: 0/0/120  Varus laxity at 0 degrees: None. Varus laxity at 30 degrees: None. Valgus laxity: None. Negative Lachman's. Negative posterior drawer. Negative Angela's. Sensation to light touch distally: normal  Distal pulses are normal and symmetric. Exam of the ipsilateral hip: normal.      Radiographs and Laboratory Studies:     Diagnostic Imaging Studies:    4 view x-ray of the right and left knee dated today were reviewed by me and demonstrates severe patellofemoral arthritis. There is mild medial and lateral joint space narrowing. Laboratory Studies:   Lab Results   Component Value Date    WBC 4.8 11/23/2022    HGB 12.0 11/23/2022    HCT 36.0 (L) 11/23/2022    MCV 99.2 (H) 11/23/2022     11/23/2022     No results found for: SEDRATE  No results found for: CRP    Time Out  [x] Patient Verified  [x] Site Verified  [x] Laterality Verified  [x] Procedure Verified    Before aspiration/injection, the risks and benefits of a cortisone injection including infection, local skin irritation, skin atrophy, continued pain/discomfort, elevated blood sugar, burning, failure to relieve pain, possible late infection were discussed with patient. Patient verbalized understanding and wanted to proceed with planned procedure.     After prepping and draping the Bilateral knee in the usual sterile fashion, 1cc of 40 mg/ml kenalog with 5cc of 1% lidocaine were injected intra-articularly without any complications. Patient tolerated this well. Post-procedure discomfort can be alleviated with additional medications/ice/elevation/rest over the first 24 hours as recommended. The patient tolerated the procedure well. Assessment:      Diagnosis Orders   1. Primary osteoarthritis of both knees        2. Right knee pain, unspecified chronicity  XR KNEE RIGHT (MIN 4 VIEWS)    NE ARTHROCENTESIS ASPIR&/INJ MAJOR JT/BURSA W/O US    NE ARTHROCENTESIS ASPIR&/INJ MAJOR JT/BURSA W/O US      3. Left knee pain, unspecified chronicity  XR KNEE LEFT (MIN 4 VIEWS)    NE ARTHROCENTESIS ASPIR&/INJ MAJOR JT/BURSA W/O US    NE ARTHROCENTESIS ASPIR&/INJ MAJOR JT/BURSA W/O US        Cooper Ferreira is a 81 y.o. female with a history and exam consistent with bilateral knee osteoarthritis, worst in the patellofemoral joint. This is an acute exacerbation of a chronic condition . Plan:     I had a discussion with the patient regarding her exam, x-ray findings, and diagnosis. She has bilateral knee arthritis, worst in the patellofemoral joint which makes sense with her symptoms. We had a discussion regarding nonoperative treatment. This includes anti-inflammatory medication, activity modification, physical therapy, and cortisone injections. She would like bilateral knee cortisone injections today. She should follow-up with me as needed.     Orders Placed This Encounter   Procedures    XR KNEE RIGHT (MIN 4 VIEWS)     Standing Status:   Future     Number of Occurrences:   1     Standing Expiration Date:   12/19/2023     Scheduling Instructions:      AP weightbearing bilateral knees with marker, lateral of right knee with marker, merchant of bilateral knee, tunnel of bilateral knee     Order Specific Question:   Reason for exam:     Answer:   Knee pain    XR KNEE LEFT (MIN 4 VIEWS)     Standing Status:   Future     Number of Occurrences:   1     Standing Expiration Date:   12/19/2023     Scheduling Instructions: Bilateral WB AP, lateral left knee, bilateral skyline, bilateral tunnel     Order Specific Question:   Reason for exam:     Answer:   knee pain    IA ARTHROCENTESIS ASPIR&/INJ MAJOR JT/BURSA W/O US    IA ARTHROCENTESIS ASPIR&/INJ MAJOR JT/BURSA W/O US     Orders Placed This Encounter   Medications    lidocaine 1 % injection 5 mL    triamcinolone acetonide (KENALOG-40) injection 40 mg    triamcinolone acetonide (KENALOG-40) injection 40 mg    lidocaine 1 % injection 5 mL         Return if symptoms worsen or fail to improve.       Rory Maddox MD

## 2023-02-07 ENCOUNTER — TELEPHONE (OUTPATIENT)
Dept: FAMILY MEDICINE CLINIC | Age: 88
End: 2023-02-07

## 2023-03-01 DIAGNOSIS — F51.04 CHRONIC INSOMNIA: ICD-10-CM

## 2023-03-01 RX ORDER — ALPRAZOLAM 0.25 MG/1
TABLET ORAL
Qty: 4 TABLET | Refills: 0 | Status: SHIPPED | OUTPATIENT
Start: 2023-03-01 | End: 2023-03-31

## 2023-03-15 ENCOUNTER — OFFICE VISIT (OUTPATIENT)
Dept: FAMILY MEDICINE CLINIC | Age: 88
End: 2023-03-15

## 2023-03-15 VITALS — BODY MASS INDEX: 20.73 KG/M2 | WEIGHT: 117 LBS | HEIGHT: 63 IN

## 2023-03-15 DIAGNOSIS — F51.04 CHRONIC INSOMNIA: ICD-10-CM

## 2023-03-15 DIAGNOSIS — F41.9 ANXIETY: Primary | ICD-10-CM

## 2023-03-15 RX ORDER — ALPRAZOLAM 0.25 MG/1
TABLET ORAL
Qty: 4 TABLET | Refills: 0 | Status: SHIPPED | OUTPATIENT
Start: 2023-03-15 | End: 2023-04-14

## 2023-03-15 RX ORDER — VENLAFAXINE HYDROCHLORIDE 37.5 MG/1
37.5 CAPSULE, EXTENDED RELEASE ORAL 2 TIMES DAILY
Qty: 60 CAPSULE | Refills: 0 | Status: SHIPPED | OUTPATIENT
Start: 2023-03-15

## 2023-03-15 RX ORDER — DEXTROMETHORPHAN HYDROBROMIDE AND PROMETHAZINE HYDROCHLORIDE 15; 6.25 MG/5ML; MG/5ML
5 SYRUP ORAL 4 TIMES DAILY PRN
Qty: 150 ML | Refills: 0 | Status: SHIPPED | OUTPATIENT
Start: 2023-03-15 | End: 2023-03-23

## 2023-03-15 SDOH — ECONOMIC STABILITY: HOUSING INSECURITY
IN THE LAST 12 MONTHS, WAS THERE A TIME WHEN YOU DID NOT HAVE A STEADY PLACE TO SLEEP OR SLEPT IN A SHELTER (INCLUDING NOW)?: NO

## 2023-03-15 SDOH — ECONOMIC STABILITY: FOOD INSECURITY: WITHIN THE PAST 12 MONTHS, YOU WORRIED THAT YOUR FOOD WOULD RUN OUT BEFORE YOU GOT MONEY TO BUY MORE.: NEVER TRUE

## 2023-03-15 SDOH — ECONOMIC STABILITY: INCOME INSECURITY: HOW HARD IS IT FOR YOU TO PAY FOR THE VERY BASICS LIKE FOOD, HOUSING, MEDICAL CARE, AND HEATING?: NOT HARD AT ALL

## 2023-03-15 SDOH — ECONOMIC STABILITY: FOOD INSECURITY: WITHIN THE PAST 12 MONTHS, THE FOOD YOU BOUGHT JUST DIDN'T LAST AND YOU DIDN'T HAVE MONEY TO GET MORE.: NEVER TRUE

## 2023-03-15 ASSESSMENT — COLUMBIA-SUICIDE SEVERITY RATING SCALE - C-SSRS
2. HAVE YOU ACTUALLY HAD ANY THOUGHTS OF KILLING YOURSELF?: NO
6. HAVE YOU EVER DONE ANYTHING, STARTED TO DO ANYTHING, OR PREPARED TO DO ANYTHING TO END YOUR LIFE?: NO
1. WITHIN THE PAST MONTH, HAVE YOU WISHED YOU WERE DEAD OR WISHED YOU COULD GO TO SLEEP AND NOT WAKE UP?: NO

## 2023-03-15 ASSESSMENT — PATIENT HEALTH QUESTIONNAIRE - PHQ9
3. TROUBLE FALLING OR STAYING ASLEEP: 3
10. IF YOU CHECKED OFF ANY PROBLEMS, HOW DIFFICULT HAVE THESE PROBLEMS MADE IT FOR YOU TO DO YOUR WORK, TAKE CARE OF THINGS AT HOME, OR GET ALONG WITH OTHER PEOPLE: 2
4. FEELING TIRED OR HAVING LITTLE ENERGY: 3
5. POOR APPETITE OR OVEREATING: 3
7. TROUBLE CONCENTRATING ON THINGS, SUCH AS READING THE NEWSPAPER OR WATCHING TELEVISION: 1
2. FEELING DOWN, DEPRESSED OR HOPELESS: 3
9. THOUGHTS THAT YOU WOULD BE BETTER OFF DEAD, OR OF HURTING YOURSELF: 2
SUM OF ALL RESPONSES TO PHQ QUESTIONS 1-9: 17
SUM OF ALL RESPONSES TO PHQ QUESTIONS 1-9: 15
SUM OF ALL RESPONSES TO PHQ QUESTIONS 1-9: 17
8. MOVING OR SPEAKING SO SLOWLY THAT OTHER PEOPLE COULD HAVE NOTICED. OR THE OPPOSITE, BEING SO FIGETY OR RESTLESS THAT YOU HAVE BEEN MOVING AROUND A LOT MORE THAN USUAL: 0
SUM OF ALL RESPONSES TO PHQ QUESTIONS 1-9: 17
6. FEELING BAD ABOUT YOURSELF - OR THAT YOU ARE A FAILURE OR HAVE LET YOURSELF OR YOUR FAMILY DOWN: 2

## 2023-03-15 NOTE — TELEPHONE ENCOUNTER
patient requesting medication refill. Please approve or deny this request.    Rx requested:  Requested Prescriptions     Pending Prescriptions Disp Refills    ALPRAZolam (XANAX) 0.25 MG tablet 4 tablet 0     Si po q hs prn insomnia    venlafaxine (EFFEXOR XR) 37.5 MG extended release capsule 60 capsule 2     Sig: Take 1 capsule by mouth in the morning and 1 capsule in the evening. Last Office Visit:   2022      Next Visit Date:  No future appointments.

## 2023-03-15 NOTE — PROGRESS NOTES
Subjective:      Patient ID: Farhan Workman is a 81 y.o. female who presents today for:  Chief Complaint   Patient presents with    Dizziness     Pt is hyperventilating due to the loss of  and ears are plug and some phelem in the chest. Pt does have a history of anxiety. Pt declined being tested for covid and flu. Unable to grab vitals due to patient being in distress. HPI SUBJECTIVE:   Farhan Workman is a 81 y.o. female who presents with anxiety. Patients reports having panic attacks since her   two weeks ago. Patient attempted  To get her prescription for xanax renewed. She was told to make an appointment. Patient was told it would be several weeks before she could get an appointment. Patient is actively crying in office, she reports being short of breath. Lung are clear, SpO2 98%, patient is hyperventilating. I spoke to her PCP who prescribed 4 xanax and recommended follow up. OBJECTIVE:  Vitals as noted above. Appearance: oriented to person, place, and time, anxious, and in mild to moderate distress. Chest - clear to auscultation, no wheezes, rales or rhonchi, symmetric air entry.     ASSESSMENT:   General anxiety disorder    PLAN:  Recommend follow up with psychologist     Past Medical History:   Diagnosis Date    Anemia     Environmental allergies     Hypertension      Past Surgical History:   Procedure Laterality Date    COLONOSCOPY  3/10/10    DR CASTILLO    HYSTERECTOMY (CERVIX STATUS UNKNOWN)      NASAL POLYP SURGERY      UPPER GASTROINTESTINAL ENDOSCOPY       Social History     Socioeconomic History    Marital status:      Spouse name: Not on file    Number of children: Not on file    Years of education: Not on file    Highest education level: Not on file   Occupational History    Not on file   Tobacco Use    Smoking status: Never    Smokeless tobacco: Never   Substance and Sexual Activity    Alcohol use: No    Drug use: No    Sexual activity: Not on file   Other Topics Concern    Not on file   Social History Narrative    Not on file     Social Determinants of Health     Financial Resource Strain: Low Risk     Difficulty of Paying Living Expenses: Not hard at all   Food Insecurity: No Food Insecurity    Worried About [address] in the Last Year: Never true    [address] in the Last Year: Never true   Transportation Needs: Unknown    Lack of Transportation (Medical): Not on file    Lack of Transportation (Non-Medical): No   Physical Activity: Not on file   Stress: Not on file   Social Connections: Not on file   Intimate Partner Violence: Not on file   Housing Stability: Unknown    Unable to Pay for Housing in the Last Year: Not on file    Number of Places Lived in the Last Year: Not on file    Unstable Housing in the Last Year: No     Family History   Problem Relation Age of Onset    Heart Disease Mother     Liver Disease Father     Heart Disease Sister      Allergies   Allergen Reactions    Amoxicillin      Current Outpatient Medications   Medication Sig Dispense Refill    ALPRAZolam (XANAX) 0.25 MG tablet 1 po q hs prn insomnia 4 tablet 0    venlafaxine (EFFEXOR XR) 37.5 MG extended release capsule Take 1 capsule by mouth in the morning and 1 capsule in the evening.  60 capsule 0    promethazine-dextromethorphan (PROMETHAZINE-DM) 6.25-15 MG/5ML syrup Take 5 mLs by mouth 4 times daily as needed for Cough 150 mL 0    Handicap Placard MISC by Does not apply route Start 22 to 24 1 each 0    Handicap Placard MISC by Does not apply route Starting 10-3-22 to 10-3-24 1 each 0    Handicap Placard MISC by Does not apply route Duration  2 years 1 each 0    Handicap Placard MISC by Does not apply route 1 each 0    Prenatal Multivit-Min-Fe-FA (PRE- PO) Take by mouth      potassium chloride 10 MEQ/100ML Infuse 10 mEq intravenously once      FERROUS SULFATE PO Take 65 mg by mouth daily      vitamin B-12 (CYANOCOBALAMIN) 1000 MCG tablet Take 1,000 mcg by mouth daily      Esomeprazole Magnesium (NEXIUM 24HR PO) Take 20 mg by mouth daily  (Patient not taking: Reported on 3/15/2023)       No current facility-administered medications for this visit. Review of Systems   Psychiatric/Behavioral:  Positive for agitation, behavioral problems, decreased concentration, dysphoric mood and sleep disturbance. The patient is nervous/anxious. All other systems reviewed and are negative. Objective:   Ht 5' 3\" (1.6 m)   Wt 117 lb (53.1 kg)   LMP  (LMP Unknown)   BMI 20.73 kg/m²     Physical Exam  Constitutional:       General: She is not in acute distress. Appearance: She is well-developed. HENT:      Head: Normocephalic and atraumatic. Nose: Nose normal.   Eyes:      Pupils: Pupils are equal, round, and reactive to light. Cardiovascular:      Rate and Rhythm: Normal rate and regular rhythm. Heart sounds: Normal heart sounds. Pulmonary:      Effort: Pulmonary effort is normal.      Breath sounds: Normal breath sounds. Abdominal:      General: Bowel sounds are normal.      Palpations: Abdomen is soft. Musculoskeletal:         General: Normal range of motion. Cervical back: Normal range of motion and neck supple. Skin:     General: Skin is warm and dry. Capillary Refill: Capillary refill takes less than 2 seconds. Neurological:      General: No focal deficit present. Mental Status: She is alert and oriented to person, place, and time. Psychiatric:         Attention and Perception: She does not perceive auditory or visual hallucinations. Mood and Affect: Mood is anxious. Affect is tearful. Speech: Speech normal.         Behavior: Behavior normal.       Assessment:       Diagnosis Orders   1. Anxiety          No results found for this visit on 03/15/23. Plan:     Assessment & Plan   Fatimah was seen today for dizziness.     Diagnoses and all orders for this visit:    Anxiety    Other orders  - promethazine-dextromethorphan (PROMETHAZINE-DM) 6.25-15 MG/5ML syrup; Take 5 mLs by mouth 4 times daily as needed for Cough    No orders of the defined types were placed in this encounter. Orders Placed This Encounter   Medications    promethazine-dextromethorphan (PROMETHAZINE-DM) 6.25-15 MG/5ML syrup     Sig: Take 5 mLs by mouth 4 times daily as needed for Cough     Dispense:  150 mL     Refill:  0     There are no discontinued medications. No follow-ups on file. Reviewed with the patient/family: current clinical status & medications. Side effects of the medication prescribed today, as well as treatment plan/rationale and result expectations have been discussed with the patient/family who expresses understanding. Patient will be discharged home in stable condition. Follow up with PCP to evaluate treatment results or return if symptoms worsen or fail to improve. Discussed signs and symptoms which require immediate follow-up in ED/call to 911. Understanding verbalized. I have reviewed the patient's medical history in detail and updated the computerized patient record.     Remington Rizo, APRN - CNP

## 2023-03-27 DIAGNOSIS — F51.04 CHRONIC INSOMNIA: ICD-10-CM

## 2023-03-27 RX ORDER — ALPRAZOLAM 0.25 MG/1
TABLET ORAL
Qty: 4 TABLET | Refills: 0 | Status: SHIPPED | OUTPATIENT
Start: 2023-03-27 | End: 2023-04-26

## 2023-03-27 NOTE — TELEPHONE ENCOUNTER
Patient called and was very upset, she stated that her   and she is very depressed and is sad. She has an appointment scheduled on 23.

## 2023-04-07 ENCOUNTER — OFFICE VISIT (OUTPATIENT)
Dept: FAMILY MEDICINE CLINIC | Age: 88
End: 2023-04-07

## 2023-04-07 VITALS
RESPIRATION RATE: 14 BRPM | BODY MASS INDEX: 21.79 KG/M2 | DIASTOLIC BLOOD PRESSURE: 82 MMHG | TEMPERATURE: 97.6 F | HEIGHT: 63 IN | WEIGHT: 123 LBS | SYSTOLIC BLOOD PRESSURE: 120 MMHG | OXYGEN SATURATION: 96 % | HEART RATE: 93 BPM

## 2023-04-07 DIAGNOSIS — F41.8 MIXED ANXIETY AND DEPRESSIVE DISORDER: ICD-10-CM

## 2023-04-07 DIAGNOSIS — F43.21 GRIEF REACTION: Primary | ICD-10-CM

## 2023-04-07 DIAGNOSIS — N18.4 CKD (CHRONIC KIDNEY DISEASE) STAGE 4, GFR 15-29 ML/MIN (HCC): ICD-10-CM

## 2023-04-07 DIAGNOSIS — F51.04 CHRONIC INSOMNIA: ICD-10-CM

## 2023-04-07 RX ORDER — VENLAFAXINE HYDROCHLORIDE 150 MG/1
150 CAPSULE, EXTENDED RELEASE ORAL DAILY
Qty: 30 CAPSULE | Refills: 0 | Status: SHIPPED | OUTPATIENT
Start: 2023-04-07

## 2023-04-07 RX ORDER — ALPRAZOLAM 0.25 MG/1
TABLET ORAL
Qty: 20 TABLET | Refills: 0 | Status: SHIPPED | OUTPATIENT
Start: 2023-04-07 | End: 2023-05-07

## 2023-04-07 RX ORDER — ALPRAZOLAM 0.25 MG/1
TABLET ORAL
Qty: 4 TABLET | Refills: 0 | Status: CANCELLED | OUTPATIENT
Start: 2023-04-07 | End: 2023-05-07

## 2023-04-07 NOTE — PROGRESS NOTES
Subjective:      Patient ID: Dolores Jackson is a 81 y.o. female    Anxiety  Presents for follow-up visit. Symptoms include insomnia. Patient reports no dizziness. Insomnia  The current episode started more than 1 year ago. Pertinent negatives include no fever, rash or weakness. Here in follow up of sorts for anxiety and insomnia.  passed away feb 22, 2023-had many medical problems. Sleeping fairly well overall. Does have several phone numbers for counseling which she has been working on. Feels depressed since passing of her . Review of Systems   Constitutional:  Negative for fever. Skin:  Negative for rash. Neurological:  Negative for dizziness and weakness. Psychiatric/Behavioral:  Negative for sleep disturbance. The patient has insomnia. Reviewed allergy, medical, social, surgical, family and med list changes and updated   Files     Social History     Socioeconomic History    Marital status:    Tobacco Use    Smoking status: Never    Smokeless tobacco: Never   Substance and Sexual Activity    Alcohol use: No    Drug use: No     Social Determinants of Health     Financial Resource Strain: Low Risk     Difficulty of Paying Living Expenses: Not hard at all   Food Insecurity: No Food Insecurity    Worried About [address] in the Last Year: Never true    [email or web address] [facility] in the Last Year: Never true   Transportation Needs: Unknown    Lack of Transportation (Non-Medical): No   Housing Stability: Unknown    Unstable Housing in the Last Year: No     Current Outpatient Medications   Medication Sig Dispense Refill    ALPRAZolam (XANAX) 0.25 MG tablet 1 po q hs prn insomnia 4 tablet 0    venlafaxine (EFFEXOR XR) 37.5 MG extended release capsule Take 1 capsule by mouth in the morning and 1 capsule in the evening.  60 capsule 0    Handicap Placard MISC by Does not apply route Start 11-14-22 to 11-14-24 1 each 0    Handicap Placard MISC by Does not apply route Starting

## 2023-04-17 ENCOUNTER — TELEPHONE (OUTPATIENT)
Dept: FAMILY MEDICINE CLINIC | Age: 88
End: 2023-04-17

## 2023-05-05 ENCOUNTER — OFFICE VISIT (OUTPATIENT)
Dept: FAMILY MEDICINE CLINIC | Age: 88
End: 2023-05-05

## 2023-05-05 VITALS
HEIGHT: 63 IN | WEIGHT: 125 LBS | TEMPERATURE: 97.2 F | OXYGEN SATURATION: 98 % | DIASTOLIC BLOOD PRESSURE: 80 MMHG | RESPIRATION RATE: 14 BRPM | SYSTOLIC BLOOD PRESSURE: 130 MMHG | BODY MASS INDEX: 22.15 KG/M2 | HEART RATE: 74 BPM

## 2023-05-05 DIAGNOSIS — F41.8 MIXED ANXIETY AND DEPRESSIVE DISORDER: ICD-10-CM

## 2023-05-05 DIAGNOSIS — F43.21 GRIEF REACTION: Primary | ICD-10-CM

## 2023-05-05 RX ORDER — VENLAFAXINE HYDROCHLORIDE 150 MG/1
150 CAPSULE, EXTENDED RELEASE ORAL DAILY
Qty: 30 CAPSULE | Refills: 2 | Status: SHIPPED | OUTPATIENT
Start: 2023-05-05

## 2023-05-05 ASSESSMENT — ENCOUNTER SYMPTOMS
VOMITING: 0
NAUSEA: 0

## 2023-05-05 NOTE — PROGRESS NOTES
Subjective:      Patient ID: Juana Siu is a 81 y.o. female    HPI  Here in follow up for depression and anxiety. Not used any more xanax since last visit. Tolerating increased dose of effexor well since last time and does seem to be doing much better as well. Sleeping well. Does feel slightly shaky in am but feels other wise thru day. Is in process of getting counseling help thru [place]. Weight up 2 pounds since last time. Review of Systems   Constitutional:  Negative for activity change. Gastrointestinal:  Negative for nausea and vomiting. Skin:  Negative for rash. Neurological:  Negative for weakness.    Reviewed allergy, medical, social, surgical, family and med list changes and updated   Files     Social History     Socioeconomic History    Marital status:    Tobacco Use    Smoking status: Never    Smokeless tobacco: Never   Substance and Sexual Activity    Alcohol use: No    Drug use: No     Social Determinants of Health     Financial Resource Strain: Low Risk     Difficulty of Paying Living Expenses: Not hard at all   Food Insecurity: No Food Insecurity    Worried About [address] in the Last Year: Never true    Ran Out of Food in the Last Year: Never true   Transportation Needs: Unknown    Lack of Transportation (Non-Medical): No   Housing Stability: Unknown    Unstable Housing in the Last Year: No     Current Outpatient Medications   Medication Sig Dispense Refill    venlafaxine (EFFEXOR XR) 150 MG extended release capsule Take 1 capsule by mouth daily 30 capsule 0    ALPRAZolam (XANAX) 0.25 MG tablet 1 po q hs prn insomnia 20 tablet 0    Handicap Placard MISC by Does not apply route Start 11-14-22 to 11-14-24 1 each 0    Handicap Placard MISC by Does not apply route Starting 10-3-22 to 10-3-24 1 each 0    Handicap Placard MISC by Does not apply route Duration  2 years 1 each 0    Handicap Placard MISC by Does not apply route 1 each 0    Prenatal Multivit-Min-Fe-FA

## 2023-08-09 ENCOUNTER — OFFICE VISIT (OUTPATIENT)
Dept: FAMILY MEDICINE CLINIC | Age: 88
End: 2023-08-09
Payer: MEDICARE

## 2023-08-09 VITALS
RESPIRATION RATE: 14 BRPM | DIASTOLIC BLOOD PRESSURE: 80 MMHG | WEIGHT: 125 LBS | HEART RATE: 101 BPM | BODY MASS INDEX: 22.15 KG/M2 | OXYGEN SATURATION: 98 % | HEIGHT: 63 IN | SYSTOLIC BLOOD PRESSURE: 120 MMHG | TEMPERATURE: 98.1 F

## 2023-08-09 DIAGNOSIS — J06.9 ACUTE UPPER RESPIRATORY INFECTION: ICD-10-CM

## 2023-08-09 DIAGNOSIS — F51.04 CHRONIC INSOMNIA: Primary | ICD-10-CM

## 2023-08-09 PROCEDURE — 99213 OFFICE O/P EST LOW 20 MIN: CPT | Performed by: FAMILY MEDICINE

## 2023-08-09 PROCEDURE — 1123F ACP DISCUSS/DSCN MKR DOCD: CPT | Performed by: FAMILY MEDICINE

## 2023-08-09 RX ORDER — ALPRAZOLAM 0.25 MG/1
TABLET ORAL
Qty: 20 TABLET | Refills: 0 | Status: SHIPPED | OUTPATIENT
Start: 2023-08-09 | End: 2023-09-08

## 2023-08-09 RX ORDER — VENLAFAXINE HYDROCHLORIDE 150 MG/1
150 CAPSULE, EXTENDED RELEASE ORAL DAILY
Qty: 30 CAPSULE | Refills: 0 | Status: SHIPPED | OUTPATIENT
Start: 2023-08-09

## 2023-08-09 ASSESSMENT — PATIENT HEALTH QUESTIONNAIRE - PHQ9
10. IF YOU CHECKED OFF ANY PROBLEMS, HOW DIFFICULT HAVE THESE PROBLEMS MADE IT FOR YOU TO DO YOUR WORK, TAKE CARE OF THINGS AT HOME, OR GET ALONG WITH OTHER PEOPLE: 0
1. LITTLE INTEREST OR PLEASURE IN DOING THINGS: 2
3. TROUBLE FALLING OR STAYING ASLEEP: 2
SUM OF ALL RESPONSES TO PHQ QUESTIONS 1-9: 8
SUM OF ALL RESPONSES TO PHQ QUESTIONS 1-9: 8
SUM OF ALL RESPONSES TO PHQ9 QUESTIONS 1 & 2: 3
9. THOUGHTS THAT YOU WOULD BE BETTER OFF DEAD, OR OF HURTING YOURSELF: 0
2. FEELING DOWN, DEPRESSED OR HOPELESS: 1
2. FEELING DOWN, DEPRESSED OR HOPELESS: 1
SUM OF ALL RESPONSES TO PHQ QUESTIONS 1-9: 5
SUM OF ALL RESPONSES TO PHQ QUESTIONS 1-9: 5
5. POOR APPETITE OR OVEREATING: 0
4. FEELING TIRED OR HAVING LITTLE ENERGY: 1
6. FEELING BAD ABOUT YOURSELF - OR THAT YOU ARE A FAILURE OR HAVE LET YOURSELF OR YOUR FAMILY DOWN: 1
1. LITTLE INTEREST OR PLEASURE IN DOING THINGS: 0
SUM OF ALL RESPONSES TO PHQ QUESTIONS 1-9: 8
SUM OF ALL RESPONSES TO PHQ QUESTIONS 1-9: 5
7. TROUBLE CONCENTRATING ON THINGS, SUCH AS READING THE NEWSPAPER OR WATCHING TELEVISION: 0
9. THOUGHTS THAT YOU WOULD BE BETTER OFF DEAD, OR OF HURTING YOURSELF: 0
8. MOVING OR SPEAKING SO SLOWLY THAT OTHER PEOPLE COULD HAVE NOTICED. OR THE OPPOSITE, BEING SO FIGETY OR RESTLESS THAT YOU HAVE BEEN MOVING AROUND A LOT MORE THAN USUAL: 0
SUM OF ALL RESPONSES TO PHQ QUESTIONS 1-9: 5
4. FEELING TIRED OR HAVING LITTLE ENERGY: 3
10. IF YOU CHECKED OFF ANY PROBLEMS, HOW DIFFICULT HAVE THESE PROBLEMS MADE IT FOR YOU TO DO YOUR WORK, TAKE CARE OF THINGS AT HOME, OR GET ALONG WITH OTHER PEOPLE: 0
SUM OF ALL RESPONSES TO PHQ9 QUESTIONS 1 & 2: 1
6. FEELING BAD ABOUT YOURSELF - OR THAT YOU ARE A FAILURE OR HAVE LET YOURSELF OR YOUR FAMILY DOWN: 0
SUM OF ALL RESPONSES TO PHQ QUESTIONS 1-9: 8
7. TROUBLE CONCENTRATING ON THINGS, SUCH AS READING THE NEWSPAPER OR WATCHING TELEVISION: 0
5. POOR APPETITE OR OVEREATING: 0
3. TROUBLE FALLING OR STAYING ASLEEP: 2
8. MOVING OR SPEAKING SO SLOWLY THAT OTHER PEOPLE COULD HAVE NOTICED. OR THE OPPOSITE, BEING SO FIGETY OR RESTLESS THAT YOU HAVE BEEN MOVING AROUND A LOT MORE THAN USUAL: 0

## 2023-08-09 ASSESSMENT — ENCOUNTER SYMPTOMS
DIARRHEA: 0
VOMITING: 0

## 2023-08-09 ASSESSMENT — LIFESTYLE VARIABLES
HOW OFTEN DO YOU HAVE A DRINK CONTAINING ALCOHOL: MONTHLY OR LESS
HOW MANY STANDARD DRINKS CONTAINING ALCOHOL DO YOU HAVE ON A TYPICAL DAY: PATIENT DOES NOT DRINK

## 2023-08-09 NOTE — PROGRESS NOTES
Subjective:      Patient ID: Sully Lauren is a 81 y.o. female    HPI  Here in follow up for anxiety and depression and . Depression fairly stable with current dose of effexor. Still using xanax on occasion which does help her anxiety. Did get slight nasal congestion and cough which started about a week ago. No sore throat. Doing much better last 2 days. Review of Systems   Constitutional:  Negative for chills and fever. Gastrointestinal:  Negative for diarrhea and vomiting. Skin:  Negative for rash. Neurological:  Negative for weakness. Psychiatric/Behavioral:  Positive for sleep disturbance. Reviewed allergy, medical, social, surgical, family and med list changes and updated   Files     Social History     Socioeconomic History    Marital status:      Spouse name: None    Number of children: None    Years of education: None    Highest education level: None   Tobacco Use    Smoking status: Never    Smokeless tobacco: Never   Substance and Sexual Activity    Alcohol use: No    Drug use: No     Social Determinants of Health     Financial Resource Strain: Low Risk     Difficulty of Paying Living Expenses: Not hard at all   Food Insecurity: No Food Insecurity    Worried About Running Out of Food in the Last Year: Never true    Ran Out of Food in the Last Year: Never true   Transportation Needs: Unknown    Lack of Transportation (Non-Medical):  No   Physical Activity: Insufficiently Active    Days of Exercise per Week: 3 days    Minutes of Exercise per Session: 30 min   Housing Stability: Unknown    Unstable Housing in the Last Year: No     Current Outpatient Medications   Medication Sig Dispense Refill    venlafaxine (EFFEXOR XR) 150 MG extended release capsule Take 1 capsule by mouth daily 30 capsule 2    Handicap Placard MISC by Does not apply route Start 11-14-22 to 11-14-24 1 each 0    Handicap Placard MISC by Does not apply route Starting 10-3-22 to 10-3-24 1 each 0    Handicap Placard

## 2023-08-21 ENCOUNTER — TELEPHONE (OUTPATIENT)
Dept: FAMILY MEDICINE CLINIC | Age: 88
End: 2023-08-21

## 2023-08-21 NOTE — TELEPHONE ENCOUNTER
Patient called and is asking that if a antibiotic can be sent in. She is very congested and her ears are plugged and is coughing. If something can be sent in, it can be sent to [facility] on [facility].

## 2023-09-05 RX ORDER — VENLAFAXINE HYDROCHLORIDE 150 MG/1
CAPSULE, EXTENDED RELEASE ORAL
Qty: 30 CAPSULE | Refills: 0 | Status: SHIPPED | OUTPATIENT
Start: 2023-09-05

## 2023-09-19 ENCOUNTER — TELEPHONE (OUTPATIENT)
Dept: FAMILY MEDICINE CLINIC | Age: 88
End: 2023-09-19

## 2023-10-08 NOTE — TELEPHONE ENCOUNTER
Pharmacy requesting medication refill.  Please approve or deny this request.    Rx requested:  Requested Prescriptions     Pending Prescriptions Disp Refills    venlafaxine (EFFEXOR XR) 150 MG extended release capsule [Pharmacy Med Name: VENLAFAXINE HCL  MG CAP] 30 capsule 0     Sig: take 1 capsule by mouth once daily         Last Office Visit:   8/9/2023      Next Visit Date:  Future Appointments   Date Time Provider [facility] [address]   11/7/2023  2:45 PM Ed Rincon MD [facility]

## 2023-10-09 RX ORDER — VENLAFAXINE HYDROCHLORIDE 150 MG/1
CAPSULE, EXTENDED RELEASE ORAL
Qty: 30 CAPSULE | Refills: 0 | Status: SHIPPED | OUTPATIENT
Start: 2023-10-09

## 2023-10-18 NOTE — PROGRESS NOTES
Social History Main Topics    Smoking status: Never Smoker    Smokeless tobacco: Never Used    Alcohol use No    Drug use: No    Sexual activity: Not Asked     Other Topics Concern    None     Social History Narrative    None     Current Outpatient Prescriptions   Medication Sig Dispense Refill    ALPRAZolam (XANAX) 0.5 MG tablet Take 1 tablet by mouth nightly as needed for Sleep 10 tablet 0    venlafaxine (EFFEXOR XR) 37.5 MG extended release capsule TAKE 1 CAPSULE BY MOUTH ONCE DAILY 30 capsule 5    Esomeprazole Magnesium (NEXIUM 24HR PO) Take by mouth      lisinopril (PRINIVIL;ZESTRIL) 10 MG tablet Take 1 tablet by mouth daily. 30 tablet 1    potassium chloride (K-DUR) 10 MEQ tablet Take 10 mEq by mouth daily. No current facility-administered medications for this visit. Family History   Problem Relation Age of Onset    Heart Disease Mother     Liver Disease Father     Heart Disease Sister      Past Medical History:   Diagnosis Date    Anemia     Environmental allergies     Hypertension      Controlled Substances Monitoring:     Attestation: The Prescription Monitoring Report for this patient was reviewed today. Precious Woodruff MD)  Documentation: No signs of potential drug abuse or diversion identified. Precious Woodruff MD)  Objective:   Physical Exam  Neck:no carotid bruits. No masses. No adenopathy. No thyroid asymmetry. Lungs:clear and equal breath sounds. No wheezes or rales. Heart:rate reg. No murmur. No gallops   Pulses:Radials 2+ equal               D.P  1+ equal  Extremities:no edema in either leg  Gen: In no acute distress  Abdomen; B.S present. Soft  Non tender. No hepatosplenomegaly. No masses   Patient with appropriate affect. Alert   Thought content appropriate  Good eye contact    Assessment:      1. Anxiety     2. Depression, unspecified depression type     3. Hypertriglyceridemia     4. Essential hypertension     5.  CKD (chronic kidney disease) Tranexamic Acid Pregnancy And Lactation Text: It is unknown if this medication is safe during pregnancy or breast feeding.

## 2023-11-13 RX ORDER — VENLAFAXINE HYDROCHLORIDE 150 MG/1
CAPSULE, EXTENDED RELEASE ORAL
Qty: 30 CAPSULE | Refills: 0 | Status: SHIPPED | OUTPATIENT
Start: 2023-11-13

## 2023-12-11 RX ORDER — VENLAFAXINE HYDROCHLORIDE 150 MG/1
CAPSULE, EXTENDED RELEASE ORAL
Qty: 90 CAPSULE | Refills: 0 | Status: SHIPPED | OUTPATIENT
Start: 2023-12-11

## 2023-12-15 RX ORDER — VENLAFAXINE HYDROCHLORIDE 150 MG/1
150 CAPSULE, EXTENDED RELEASE ORAL DAILY
Qty: 90 CAPSULE | Refills: 0 | OUTPATIENT
Start: 2023-12-15
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
## 2024-01-09 ENCOUNTER — OFFICE VISIT (OUTPATIENT)
Dept: FAMILY MEDICINE CLINIC | Age: 89
End: 2024-01-09
Payer: MEDICARE

## 2024-01-09 VITALS
BODY MASS INDEX: 22.32 KG/M2 | HEART RATE: 90 BPM | SYSTOLIC BLOOD PRESSURE: 120 MMHG | WEIGHT: 126 LBS | OXYGEN SATURATION: 97 % | DIASTOLIC BLOOD PRESSURE: 82 MMHG | HEIGHT: 63 IN | TEMPERATURE: 97.5 F

## 2024-01-09 DIAGNOSIS — F41.8 MIXED ANXIETY AND DEPRESSIVE DISORDER: ICD-10-CM

## 2024-01-09 DIAGNOSIS — F51.04 CHRONIC INSOMNIA: Primary | ICD-10-CM

## 2024-01-09 DIAGNOSIS — N18.4 CKD (CHRONIC KIDNEY DISEASE) STAGE 4, GFR 15-29 ML/MIN (HCC): ICD-10-CM

## 2024-01-09 PROCEDURE — 1123F ACP DISCUSS/DSCN MKR DOCD: CPT | Performed by: FAMILY MEDICINE

## 2024-01-09 PROCEDURE — 99214 OFFICE O/P EST MOD 30 MIN: CPT | Performed by: FAMILY MEDICINE

## 2024-01-09 RX ORDER — ALPRAZOLAM 0.25 MG/1
TABLET ORAL
Qty: 30 TABLET | Refills: 0 | Status: SHIPPED | OUTPATIENT
Start: 2024-01-09 | End: 2024-02-08

## 2024-01-09 ASSESSMENT — PATIENT HEALTH QUESTIONNAIRE - PHQ9
SUM OF ALL RESPONSES TO PHQ9 QUESTIONS 1 & 2: 1
SUM OF ALL RESPONSES TO PHQ QUESTIONS 1-9: 5
8. MOVING OR SPEAKING SO SLOWLY THAT OTHER PEOPLE COULD HAVE NOTICED. OR THE OPPOSITE, BEING SO FIGETY OR RESTLESS THAT YOU HAVE BEEN MOVING AROUND A LOT MORE THAN USUAL: 0
SUM OF ALL RESPONSES TO PHQ QUESTIONS 1-9: 5
3. TROUBLE FALLING OR STAYING ASLEEP: 2
2. FEELING DOWN, DEPRESSED OR HOPELESS: 1
1. LITTLE INTEREST OR PLEASURE IN DOING THINGS: 0
SUM OF ALL RESPONSES TO PHQ QUESTIONS 1-9: 5
6. FEELING BAD ABOUT YOURSELF - OR THAT YOU ARE A FAILURE OR HAVE LET YOURSELF OR YOUR FAMILY DOWN: 1
4. FEELING TIRED OR HAVING LITTLE ENERGY: 1
7. TROUBLE CONCENTRATING ON THINGS, SUCH AS READING THE NEWSPAPER OR WATCHING TELEVISION: 0
10. IF YOU CHECKED OFF ANY PROBLEMS, HOW DIFFICULT HAVE THESE PROBLEMS MADE IT FOR YOU TO DO YOUR WORK, TAKE CARE OF THINGS AT HOME, OR GET ALONG WITH OTHER PEOPLE: 0
SUM OF ALL RESPONSES TO PHQ QUESTIONS 1-9: 5
9. THOUGHTS THAT YOU WOULD BE BETTER OFF DEAD, OR OF HURTING YOURSELF: 0
5. POOR APPETITE OR OVEREATING: 0

## 2024-01-09 ASSESSMENT — ENCOUNTER SYMPTOMS
COUGH: 0
VOMITING: 0
DIARRHEA: 0

## 2024-01-09 NOTE — PROGRESS NOTES
Subjective:      Patient ID: Fatimah Simmons is a 88 y.o. female    Anxiety  Symptoms include insomnia. Patient reports no dizziness.       Insomnia  Pertinent negatives include no chills, coughing, fever, rash, vomiting or weakness.     Here in follow up for insomnia and anxiety and depression.  Using xanax on occasion for insomnia--few doses per month.  . Effexor working well for anxiety and depression.   Weight about the same as last time.     Review of Systems   Constitutional:  Negative for chills, fever and unexpected weight change.   Respiratory:  Negative for cough.    Gastrointestinal:  Negative for diarrhea and vomiting.   Skin:  Negative for rash.   Neurological:  Negative for dizziness and weakness.   Psychiatric/Behavioral:  The patient has insomnia.      Reviewed allergy, medical, social, surgical, family and med list changes and updated   Files     Social History     Socioeconomic History    Marital status:    Tobacco Use    Smoking status: Never    Smokeless tobacco: Never   Substance and Sexual Activity    Alcohol use: No    Drug use: No     Social Determinants of Health     Financial Resource Strain: Low Risk  (3/15/2023)    Overall Financial Resource Strain (CARDIA)     Difficulty of Paying Living Expenses: Not hard at all   Transportation Needs: Unknown (3/15/2023)    PRAPARE - Transportation     Lack of Transportation (Non-Medical): No   Physical Activity: Insufficiently Active (8/9/2023)    Exercise Vital Sign     Days of Exercise per Week: 3 days     Minutes of Exercise per Session: 30 min   Housing Stability: Unknown (3/15/2023)    Housing Stability Vital Sign     Unstable Housing in the Last Year: No     Current Outpatient Medications   Medication Sig Dispense Refill    venlafaxine (EFFEXOR XR) 150 MG extended release capsule take 1 capsule by mouth once daily 90 capsule 0    Handicap Placard MISC by Does not apply route Start 11-14-22 to 11-14-24 1 each 0    Handicap Placard MISC by

## 2024-03-07 RX ORDER — VENLAFAXINE HYDROCHLORIDE 150 MG/1
CAPSULE, EXTENDED RELEASE ORAL
Qty: 90 CAPSULE | Refills: 0 | Status: SHIPPED | OUTPATIENT
Start: 2024-03-07

## 2024-04-09 ENCOUNTER — OFFICE VISIT (OUTPATIENT)
Dept: FAMILY MEDICINE CLINIC | Age: 89
End: 2024-04-09
Payer: MEDICARE

## 2024-04-09 VITALS
HEART RATE: 89 BPM | HEIGHT: 63 IN | OXYGEN SATURATION: 100 % | TEMPERATURE: 98.2 F | SYSTOLIC BLOOD PRESSURE: 124 MMHG | BODY MASS INDEX: 21.97 KG/M2 | WEIGHT: 124 LBS | DIASTOLIC BLOOD PRESSURE: 86 MMHG

## 2024-04-09 DIAGNOSIS — F41.8 MIXED ANXIETY AND DEPRESSIVE DISORDER: Primary | ICD-10-CM

## 2024-04-09 DIAGNOSIS — F51.04 CHRONIC INSOMNIA: ICD-10-CM

## 2024-04-09 PROCEDURE — 99213 OFFICE O/P EST LOW 20 MIN: CPT | Performed by: FAMILY MEDICINE

## 2024-04-09 PROCEDURE — 1123F ACP DISCUSS/DSCN MKR DOCD: CPT | Performed by: FAMILY MEDICINE

## 2024-04-09 SDOH — ECONOMIC STABILITY: FOOD INSECURITY: WITHIN THE PAST 12 MONTHS, YOU WORRIED THAT YOUR FOOD WOULD RUN OUT BEFORE YOU GOT MONEY TO BUY MORE.: NEVER TRUE

## 2024-04-09 SDOH — ECONOMIC STABILITY: FOOD INSECURITY: WITHIN THE PAST 12 MONTHS, THE FOOD YOU BOUGHT JUST DIDN'T LAST AND YOU DIDN'T HAVE MONEY TO GET MORE.: NEVER TRUE

## 2024-04-09 SDOH — ECONOMIC STABILITY: INCOME INSECURITY: HOW HARD IS IT FOR YOU TO PAY FOR THE VERY BASICS LIKE FOOD, HOUSING, MEDICAL CARE, AND HEATING?: NOT VERY HARD

## 2024-04-09 NOTE — PROGRESS NOTES
Subjective:      Patient ID: Fatimah Simmons is a 88 y.o. female.    HPI Treatment Adherence:   Medication compliance:  {Desc; compliance:5303::\"compliant most of the time\"}  Diet compliance:  {Desc; compliance:5303::\"compliant most of the time\"}  Weight trend: {INCREASING/DECREASING/STABLE:65182}  Current exercise: {EXERCISE TYPE:913906700}  Barriers: {Barriers to success:48888}    Hypertension:  Home blood pressure monitoring: {NO/YES:2413090973}.  She {is/is not:9024} adherent to a low sodium diet. Patient {denies/complains:45517} {Symptoms of Hypertension, Denies:29258}.  Antihypertensive medication side effects: {Hypertension med side effects:5728::\"no medication side effects noted\"}.  Use of agents associated with hypertension: {bp agents assoc with hypertension:511::\"none\"}.                                        Sodium (mEq/L)   Date Value   12/15/2023 143    BUN (mg/dL)   Date Value   12/15/2023 38 (H)    Glucose (mg/dL)   Date Value   12/15/2023 75   03/05/2012 88      Potassium (mEq/L)   Date Value   12/15/2023 4.3    Creatinine (mg/dL)   Date Value   12/15/2023 1.87 (H)         Hyperlipidemia:  No new myalgias or GI upset on {RP HYPERLIPIDEMIA MEDS:88499}.     Lab Results   Component Value Date    CHOL 195 09/11/2020    TRIG 152 (H) 09/11/2020    HDL 38 (L) 09/11/2020    LDLCALC 127 09/11/2020     Lab Results   Component Value Date    ALT 11 09/11/2020    AST 16 09/11/2020            Review of Systems    Objective:   Physical Exam    Assessment / Plan:
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
## 2024-05-02 ENCOUNTER — OFFICE VISIT (OUTPATIENT)
Dept: FAMILY MEDICINE CLINIC | Age: 89
End: 2024-05-02

## 2024-05-02 VITALS
OXYGEN SATURATION: 96 % | HEART RATE: 105 BPM | WEIGHT: 122.4 LBS | BODY MASS INDEX: 21.69 KG/M2 | DIASTOLIC BLOOD PRESSURE: 88 MMHG | TEMPERATURE: 98.6 F | HEIGHT: 63 IN | SYSTOLIC BLOOD PRESSURE: 130 MMHG

## 2024-05-02 DIAGNOSIS — H61.23 BILATERAL IMPACTED CERUMEN: Primary | ICD-10-CM

## 2024-05-02 DIAGNOSIS — H60.313 ACUTE DIFFUSE OTITIS EXTERNA OF BOTH EARS: ICD-10-CM

## 2024-05-02 ASSESSMENT — ENCOUNTER SYMPTOMS
EYE PAIN: 0
VOICE CHANGE: 0
RHINORRHEA: 0
EYE ITCHING: 0
STRIDOR: 0
SHORTNESS OF BREATH: 0
WHEEZING: 0
SORE THROAT: 0
EYE DISCHARGE: 0
FACIAL SWELLING: 0
ABDOMINAL PAIN: 0
SINUS PRESSURE: 0
NAUSEA: 0
VOMITING: 0
TROUBLE SWALLOWING: 0
CHEST TIGHTNESS: 0
COUGH: 0
EYE REDNESS: 0
DIARRHEA: 0
SINUS PAIN: 0

## 2024-05-02 NOTE — PROGRESS NOTES
for worsening of condition, if symptoms do not improve in 3-5 days.        Reviewed with the patient/family: current clinical status & medications.  Side effects of the medication prescribed today, as well as treatment plan/rationale and result expectations have been discussed with the patient/family who expresses understanding. Patient will be discharged home in stable condition.    Follow up with PCP to evaluate treatment results or return if symptoms worsen or fail to improve. Discussed signs and symptoms which require immediate follow-up in ED/call to 911.  Understanding verbalized.     I have reviewed the patient's medical history in detail and updated the computerized patient record.    LEIDA BRITO, APRN - CNP

## 2024-05-10 ENCOUNTER — APPOINTMENT (OUTPATIENT)
Dept: CARDIOLOGY | Facility: HOSPITAL | Age: 89
DRG: 871 | End: 2024-05-10
Payer: MEDICARE

## 2024-05-10 ENCOUNTER — APPOINTMENT (OUTPATIENT)
Dept: RADIOLOGY | Facility: HOSPITAL | Age: 89
DRG: 871 | End: 2024-05-10
Payer: MEDICARE

## 2024-05-10 ENCOUNTER — HOSPITAL ENCOUNTER (INPATIENT)
Facility: HOSPITAL | Age: 89
LOS: 7 days | Discharge: NURSING, REHAB OR LONG-TERM CARE | DRG: 871 | End: 2024-05-17
Attending: STUDENT IN AN ORGANIZED HEALTH CARE EDUCATION/TRAINING PROGRAM | Admitting: EMERGENCY MEDICINE
Payer: MEDICARE

## 2024-05-10 DIAGNOSIS — J18.9 PNEUMONIA OF BOTH LUNGS DUE TO INFECTIOUS ORGANISM, UNSPECIFIED PART OF LUNG: Primary | ICD-10-CM

## 2024-05-10 DIAGNOSIS — I50.1 PULMONARY EDEMA CARDIAC CAUSE (MULTI): ICD-10-CM

## 2024-05-10 DIAGNOSIS — A41.9 SEPSIS, DUE TO UNSPECIFIED ORGANISM, UNSPECIFIED WHETHER ACUTE ORGAN DYSFUNCTION PRESENT (MULTI): ICD-10-CM

## 2024-05-10 DIAGNOSIS — N17.9 AKI (ACUTE KIDNEY INJURY) (CMS-HCC): ICD-10-CM

## 2024-05-10 DIAGNOSIS — J96.00 ACUTE RESPIRATORY FAILURE, UNSPECIFIED WHETHER WITH HYPOXIA OR HYPERCAPNIA (MULTI): ICD-10-CM

## 2024-05-10 DIAGNOSIS — J96.01 ACUTE RESPIRATORY FAILURE WITH HYPOXIA (MULTI): ICD-10-CM

## 2024-05-10 LAB
ALBUMIN SERPL BCP-MCNC: 3.5 G/DL (ref 3.4–5)
ALP SERPL-CCNC: 81 U/L (ref 33–136)
ALT SERPL W P-5'-P-CCNC: 20 U/L (ref 7–45)
ANION GAP BLDV CALCULATED.4IONS-SCNC: 14 MMOL/L (ref 10–25)
ANION GAP SERPL CALC-SCNC: 19 MMOL/L (ref 10–20)
APPEARANCE UR: CLEAR
AST SERPL W P-5'-P-CCNC: 25 U/L (ref 9–39)
ATRIAL RATE: 107 BPM
BACTERIA #/AREA URNS AUTO: ABNORMAL /HPF
BASE EXCESS BLDV CALC-SCNC: -8.8 MMOL/L (ref -2–3)
BASOPHILS # BLD MANUAL: 0 X10*3/UL (ref 0–0.1)
BASOPHILS NFR BLD MANUAL: 0 %
BILIRUB SERPL-MCNC: 0.8 MG/DL (ref 0–1.2)
BILIRUB UR STRIP.AUTO-MCNC: NEGATIVE MG/DL
BNP SERPL-MCNC: 104 PG/ML (ref 0–99)
BODY TEMPERATURE: ABNORMAL
BUN SERPL-MCNC: 66 MG/DL (ref 6–23)
BURR CELLS BLD QL SMEAR: ABNORMAL
CA-I BLDV-SCNC: 1.31 MMOL/L (ref 1.1–1.33)
CALCIUM SERPL-MCNC: 9.6 MG/DL (ref 8.6–10.3)
CARDIAC TROPONIN I PNL SERPL HS: 25 NG/L (ref 0–13)
CARDIAC TROPONIN I PNL SERPL HS: 26 NG/L (ref 0–13)
CHLORIDE BLDV-SCNC: 109 MMOL/L (ref 98–107)
CHLORIDE SERPL-SCNC: 106 MMOL/L (ref 98–107)
CO2 SERPL-SCNC: 15 MMOL/L (ref 21–32)
COLOR UR: ABNORMAL
CREAT SERPL-MCNC: 2.1 MG/DL (ref 0.5–1.05)
EGFRCR SERPLBLD CKD-EPI 2021: 22 ML/MIN/1.73M*2
EOSINOPHIL # BLD MANUAL: 0 X10*3/UL (ref 0–0.4)
EOSINOPHIL NFR BLD MANUAL: 0 %
ERYTHROCYTE [DISTWIDTH] IN BLOOD BY AUTOMATED COUNT: 13.7 % (ref 11.5–14.5)
EST. AVERAGE GLUCOSE BLD GHB EST-MCNC: 123 MG/DL
FLUAV RNA RESP QL NAA+PROBE: NOT DETECTED
FLUBV RNA RESP QL NAA+PROBE: NOT DETECTED
GLUCOSE BLD MANUAL STRIP-MCNC: 171 MG/DL (ref 74–99)
GLUCOSE BLD MANUAL STRIP-MCNC: 274 MG/DL (ref 74–99)
GLUCOSE BLDV-MCNC: 183 MG/DL (ref 74–99)
GLUCOSE SERPL-MCNC: 168 MG/DL (ref 74–99)
GLUCOSE UR STRIP.AUTO-MCNC: ABNORMAL MG/DL
HBA1C MFR BLD: 5.9 %
HCO3 BLDV-SCNC: 16.7 MMOL/L (ref 22–26)
HCT VFR BLD AUTO: 31.7 % (ref 36–46)
HCT VFR BLD EST: 34 % (ref 36–46)
HGB BLD-MCNC: 10.9 G/DL (ref 12–16)
HGB BLDV-MCNC: 11.3 G/DL (ref 12–16)
HOLD SPECIMEN: NORMAL
HOLD SPECIMEN: NORMAL
IMM GRANULOCYTES # BLD AUTO: 0.16 X10*3/UL (ref 0–0.5)
IMM GRANULOCYTES NFR BLD AUTO: 1.1 % (ref 0–0.9)
INHALED O2 CONCENTRATION: 50 %
KETONES UR STRIP.AUTO-MCNC: NEGATIVE MG/DL
LACTATE BLDV-SCNC: 2.6 MMOL/L (ref 0.4–2)
LACTATE BLDV-SCNC: 2.7 MMOL/L (ref 0.4–2)
LACTATE SERPL-SCNC: 1.9 MMOL/L (ref 0.4–2)
LEUKOCYTE ESTERASE UR QL STRIP.AUTO: ABNORMAL
LYMPHOCYTES # BLD MANUAL: 2.74 X10*3/UL (ref 0.8–3)
LYMPHOCYTES NFR BLD MANUAL: 19 %
MAGNESIUM SERPL-MCNC: 2.19 MG/DL (ref 1.6–2.4)
MCH RBC QN AUTO: 32.8 PG (ref 26–34)
MCHC RBC AUTO-ENTMCNC: 34.4 G/DL (ref 32–36)
MCV RBC AUTO: 96 FL (ref 80–100)
MONOCYTES # BLD MANUAL: 1.01 X10*3/UL (ref 0.05–0.8)
MONOCYTES NFR BLD MANUAL: 7 %
MRSA DNA SPEC QL NAA+PROBE: NOT DETECTED
NEUTROPHILS # BLD MANUAL: 10.66 X10*3/UL (ref 1.6–5.5)
NEUTS BAND # BLD MANUAL: 1.44 X10*3/UL (ref 0–0.5)
NEUTS BAND NFR BLD MANUAL: 10 %
NEUTS SEG # BLD MANUAL: 9.22 X10*3/UL (ref 1.6–5)
NEUTS SEG NFR BLD MANUAL: 64 %
NEUTS VAC BLD QL SMEAR: PRESENT
NITRITE UR QL STRIP.AUTO: NEGATIVE
NRBC BLD-RTO: 0 /100 WBCS (ref 0–0)
OVALOCYTES BLD QL SMEAR: ABNORMAL
OXYHGB MFR BLDV: 68.2 % (ref 45–75)
P AXIS: 58 DEGREES
P OFFSET: 222 MS
P ONSET: 168 MS
PCO2 BLDV: 34 MM HG (ref 41–51)
PH BLDV: 7.3 PH (ref 7.33–7.43)
PH UR STRIP.AUTO: 6 [PH]
PHOSPHATE SERPL-MCNC: 4 MG/DL (ref 2.5–4.9)
PLATELET # BLD AUTO: 253 X10*3/UL (ref 150–450)
PO2 BLDV: 44 MM HG (ref 35–45)
POTASSIUM BLDV-SCNC: 3.6 MMOL/L (ref 3.5–5.3)
POTASSIUM SERPL-SCNC: 3.4 MMOL/L (ref 3.5–5.3)
PR INTERVAL: 112 MS
PROT SERPL-MCNC: 9.1 G/DL (ref 6.4–8.2)
PROT UR STRIP.AUTO-MCNC: ABNORMAL MG/DL
Q ONSET: 224 MS
QRS COUNT: 18 BEATS
QRS DURATION: 84 MS
QT INTERVAL: 340 MS
QTC CALCULATION(BAZETT): 453 MS
QTC FREDERICIA: 412 MS
R AXIS: 54 DEGREES
RBC # BLD AUTO: 3.32 X10*6/UL (ref 4–5.2)
RBC # UR STRIP.AUTO: ABNORMAL /UL
RBC #/AREA URNS AUTO: ABNORMAL /HPF
RBC MORPH BLD: ABNORMAL
RSV RNA RESP QL NAA+PROBE: NOT DETECTED
SAO2 % BLDV: 69 % (ref 45–75)
SARS-COV-2 RNA RESP QL NAA+PROBE: NOT DETECTED
SODIUM BLDV-SCNC: 136 MMOL/L (ref 136–145)
SODIUM SERPL-SCNC: 137 MMOL/L (ref 136–145)
SP GR UR STRIP.AUTO: 1.01
SQUAMOUS #/AREA URNS AUTO: ABNORMAL /HPF
T AXIS: 82 DEGREES
T OFFSET: 394 MS
TOTAL CELLS COUNTED BLD: 100
TOXIC GRANULES BLD QL SMEAR: PRESENT
UROBILINOGEN UR STRIP.AUTO-MCNC: <2 MG/DL
VENTRICULAR RATE: 107 BPM
WBC # BLD AUTO: 14.4 X10*3/UL (ref 4.4–11.3)
WBC #/AREA URNS AUTO: ABNORMAL /HPF

## 2024-05-10 PROCEDURE — 83880 ASSAY OF NATRIURETIC PEPTIDE: CPT | Performed by: STUDENT IN AN ORGANIZED HEALTH CARE EDUCATION/TRAINING PROGRAM

## 2024-05-10 PROCEDURE — 71045 X-RAY EXAM CHEST 1 VIEW: CPT | Performed by: RADIOLOGY

## 2024-05-10 PROCEDURE — 71045 X-RAY EXAM CHEST 1 VIEW: CPT

## 2024-05-10 PROCEDURE — 96365 THER/PROPH/DIAG IV INF INIT: CPT

## 2024-05-10 PROCEDURE — 99291 CRITICAL CARE FIRST HOUR: CPT | Performed by: NURSE PRACTITIONER

## 2024-05-10 PROCEDURE — 85007 BL SMEAR W/DIFF WBC COUNT: CPT | Performed by: STUDENT IN AN ORGANIZED HEALTH CARE EDUCATION/TRAINING PROGRAM

## 2024-05-10 PROCEDURE — 84100 ASSAY OF PHOSPHORUS: CPT | Performed by: STUDENT IN AN ORGANIZED HEALTH CARE EDUCATION/TRAINING PROGRAM

## 2024-05-10 PROCEDURE — 87040 BLOOD CULTURE FOR BACTERIA: CPT | Mod: ELYLAB | Performed by: STUDENT IN AN ORGANIZED HEALTH CARE EDUCATION/TRAINING PROGRAM

## 2024-05-10 PROCEDURE — 36415 COLL VENOUS BLD VENIPUNCTURE: CPT | Performed by: NURSE PRACTITIONER

## 2024-05-10 PROCEDURE — 85027 COMPLETE CBC AUTOMATED: CPT | Performed by: STUDENT IN AN ORGANIZED HEALTH CARE EDUCATION/TRAINING PROGRAM

## 2024-05-10 PROCEDURE — 71250 CT THORAX DX C-: CPT | Performed by: RADIOLOGY

## 2024-05-10 PROCEDURE — 87899 AGENT NOS ASSAY W/OPTIC: CPT | Mod: ELYLAB | Performed by: NURSE PRACTITIONER

## 2024-05-10 PROCEDURE — 96368 THER/DIAG CONCURRENT INF: CPT

## 2024-05-10 PROCEDURE — 81001 URINALYSIS AUTO W/SCOPE: CPT | Performed by: STUDENT IN AN ORGANIZED HEALTH CARE EDUCATION/TRAINING PROGRAM

## 2024-05-10 PROCEDURE — 99223 1ST HOSP IP/OBS HIGH 75: CPT | Performed by: STUDENT IN AN ORGANIZED HEALTH CARE EDUCATION/TRAINING PROGRAM

## 2024-05-10 PROCEDURE — 2500000004 HC RX 250 GENERAL PHARMACY W/ HCPCS (ALT 636 FOR OP/ED): Performed by: STUDENT IN AN ORGANIZED HEALTH CARE EDUCATION/TRAINING PROGRAM

## 2024-05-10 PROCEDURE — 36415 COLL VENOUS BLD VENIPUNCTURE: CPT | Performed by: STUDENT IN AN ORGANIZED HEALTH CARE EDUCATION/TRAINING PROGRAM

## 2024-05-10 PROCEDURE — 84145 PROCALCITONIN (PCT): CPT | Mod: ELYLAB | Performed by: NURSE PRACTITIONER

## 2024-05-10 PROCEDURE — 82947 ASSAY GLUCOSE BLOOD QUANT: CPT

## 2024-05-10 PROCEDURE — 87086 URINE CULTURE/COLONY COUNT: CPT | Mod: ELYLAB | Performed by: STUDENT IN AN ORGANIZED HEALTH CARE EDUCATION/TRAINING PROGRAM

## 2024-05-10 PROCEDURE — 94640 AIRWAY INHALATION TREATMENT: CPT

## 2024-05-10 PROCEDURE — 99285 EMERGENCY DEPT VISIT HI MDM: CPT | Mod: 25

## 2024-05-10 PROCEDURE — 84132 ASSAY OF SERUM POTASSIUM: CPT | Mod: 91 | Performed by: STUDENT IN AN ORGANIZED HEALTH CARE EDUCATION/TRAINING PROGRAM

## 2024-05-10 PROCEDURE — 83036 HEMOGLOBIN GLYCOSYLATED A1C: CPT | Mod: ELYLAB | Performed by: NURSE PRACTITIONER

## 2024-05-10 PROCEDURE — 84484 ASSAY OF TROPONIN QUANT: CPT | Performed by: STUDENT IN AN ORGANIZED HEALTH CARE EDUCATION/TRAINING PROGRAM

## 2024-05-10 PROCEDURE — 93005 ELECTROCARDIOGRAM TRACING: CPT

## 2024-05-10 PROCEDURE — 87449 NOS EACH ORGANISM AG IA: CPT | Mod: ELYLAB | Performed by: NURSE PRACTITIONER

## 2024-05-10 PROCEDURE — 87640 STAPH A DNA AMP PROBE: CPT | Mod: 91 | Performed by: STUDENT IN AN ORGANIZED HEALTH CARE EDUCATION/TRAINING PROGRAM

## 2024-05-10 PROCEDURE — 2500000004 HC RX 250 GENERAL PHARMACY W/ HCPCS (ALT 636 FOR OP/ED)

## 2024-05-10 PROCEDURE — 83605 ASSAY OF LACTIC ACID: CPT | Mod: MUE | Performed by: STUDENT IN AN ORGANIZED HEALTH CARE EDUCATION/TRAINING PROGRAM

## 2024-05-10 PROCEDURE — 71250 CT THORAX DX C-: CPT

## 2024-05-10 PROCEDURE — 2020000001 HC ICU ROOM DAILY

## 2024-05-10 PROCEDURE — 2500000004 HC RX 250 GENERAL PHARMACY W/ HCPCS (ALT 636 FOR OP/ED): Performed by: NURSE PRACTITIONER

## 2024-05-10 PROCEDURE — 87637 SARSCOV2&INF A&B&RSV AMP PRB: CPT | Performed by: STUDENT IN AN ORGANIZED HEALTH CARE EDUCATION/TRAINING PROGRAM

## 2024-05-10 PROCEDURE — 2500000002 HC RX 250 W HCPCS SELF ADMINISTERED DRUGS (ALT 637 FOR MEDICARE OP, ALT 636 FOR OP/ED): Mod: MUE | Performed by: NURSE PRACTITIONER

## 2024-05-10 PROCEDURE — 83605 ASSAY OF LACTIC ACID: CPT | Performed by: STUDENT IN AN ORGANIZED HEALTH CARE EDUCATION/TRAINING PROGRAM

## 2024-05-10 PROCEDURE — 83735 ASSAY OF MAGNESIUM: CPT | Performed by: STUDENT IN AN ORGANIZED HEALTH CARE EDUCATION/TRAINING PROGRAM

## 2024-05-10 PROCEDURE — 84075 ASSAY ALKALINE PHOSPHATASE: CPT | Performed by: STUDENT IN AN ORGANIZED HEALTH CARE EDUCATION/TRAINING PROGRAM

## 2024-05-10 RX ORDER — IPRATROPIUM BROMIDE AND ALBUTEROL SULFATE 2.5; .5 MG/3ML; MG/3ML
3 SOLUTION RESPIRATORY (INHALATION)
Status: DISCONTINUED | OUTPATIENT
Start: 2024-05-10 | End: 2024-05-11

## 2024-05-10 RX ORDER — FUROSEMIDE 10 MG/ML
40 INJECTION INTRAMUSCULAR; INTRAVENOUS ONCE
Status: COMPLETED | OUTPATIENT
Start: 2024-05-10 | End: 2024-05-10

## 2024-05-10 RX ORDER — DEXTROSE 50 % IN WATER (D50W) INTRAVENOUS SYRINGE
25
Status: DISCONTINUED | OUTPATIENT
Start: 2024-05-10 | End: 2024-05-17 | Stop reason: HOSPADM

## 2024-05-10 RX ORDER — VENLAFAXINE HYDROCHLORIDE 150 MG/1
150 CAPSULE, EXTENDED RELEASE ORAL DAILY
COMMUNITY

## 2024-05-10 RX ORDER — PANTOPRAZOLE SODIUM 40 MG/1
40 TABLET, DELAYED RELEASE ORAL
Status: DISCONTINUED | OUTPATIENT
Start: 2024-05-11 | End: 2024-05-17 | Stop reason: HOSPADM

## 2024-05-10 RX ORDER — MIDAZOLAM HYDROCHLORIDE 1 MG/ML
0.5 INJECTION, SOLUTION INTRAMUSCULAR; INTRAVENOUS ONCE
Status: COMPLETED | OUTPATIENT
Start: 2024-05-10 | End: 2024-05-10

## 2024-05-10 RX ORDER — MIDAZOLAM HYDROCHLORIDE 1 MG/ML
INJECTION, SOLUTION INTRAMUSCULAR; INTRAVENOUS
Status: COMPLETED
Start: 2024-05-10 | End: 2024-05-10

## 2024-05-10 RX ORDER — INSULIN LISPRO 100 [IU]/ML
0-5 INJECTION, SOLUTION INTRAVENOUS; SUBCUTANEOUS EVERY 6 HOURS
Status: DISCONTINUED | OUTPATIENT
Start: 2024-05-10 | End: 2024-05-11

## 2024-05-10 RX ORDER — VENLAFAXINE HYDROCHLORIDE 150 MG/1
150 CAPSULE, EXTENDED RELEASE ORAL
Status: DISCONTINUED | OUTPATIENT
Start: 2024-05-11 | End: 2024-05-10

## 2024-05-10 RX ORDER — PANTOPRAZOLE SODIUM 40 MG/1
40 TABLET, DELAYED RELEASE ORAL
Status: DISCONTINUED | OUTPATIENT
Start: 2024-05-11 | End: 2024-05-10

## 2024-05-10 RX ORDER — POTASSIUM CHLORIDE 14.9 MG/ML
20 INJECTION INTRAVENOUS
Status: COMPLETED | OUTPATIENT
Start: 2024-05-10 | End: 2024-05-10

## 2024-05-10 RX ORDER — SODIUM CHLORIDE 9 MG/ML
75 INJECTION, SOLUTION INTRAVENOUS CONTINUOUS
Status: DISCONTINUED | OUTPATIENT
Start: 2024-05-10 | End: 2024-05-10

## 2024-05-10 RX ORDER — VENLAFAXINE HYDROCHLORIDE 150 MG/1
150 CAPSULE, EXTENDED RELEASE ORAL DAILY
Status: DISCONTINUED | OUTPATIENT
Start: 2024-05-10 | End: 2024-05-17 | Stop reason: HOSPADM

## 2024-05-10 RX ORDER — POTASSIUM CHLORIDE 750 MG/1
10 TABLET, FILM COATED, EXTENDED RELEASE ORAL DAILY
COMMUNITY
End: 2024-05-17 | Stop reason: HOSPADM

## 2024-05-10 RX ORDER — ALPRAZOLAM 0.25 MG/1
0.25 TABLET ORAL NIGHTLY PRN
COMMUNITY
End: 2024-05-20 | Stop reason: SDUPTHER

## 2024-05-10 RX ORDER — POTASSIUM CHLORIDE 20 MEQ/1
40 TABLET, EXTENDED RELEASE ORAL ONCE
Status: DISCONTINUED | OUTPATIENT
Start: 2024-05-10 | End: 2024-05-10

## 2024-05-10 RX ORDER — DEXTROSE 50 % IN WATER (D50W) INTRAVENOUS SYRINGE
12.5
Status: DISCONTINUED | OUTPATIENT
Start: 2024-05-10 | End: 2024-05-17 | Stop reason: HOSPADM

## 2024-05-10 RX ORDER — ENOXAPARIN SODIUM 100 MG/ML
30 INJECTION SUBCUTANEOUS EVERY 24 HOURS
Status: DISCONTINUED | OUTPATIENT
Start: 2024-05-10 | End: 2024-05-17 | Stop reason: HOSPADM

## 2024-05-10 RX ADMIN — IPRATROPIUM BROMIDE AND ALBUTEROL SULFATE 3 ML: 2.5; .5 SOLUTION RESPIRATORY (INHALATION) at 20:31

## 2024-05-10 RX ADMIN — INSULIN LISPRO 3 UNITS: 100 INJECTION, SOLUTION INTRAVENOUS; SUBCUTANEOUS at 17:09

## 2024-05-10 RX ADMIN — ENOXAPARIN SODIUM 30 MG: 30 INJECTION SUBCUTANEOUS at 17:09

## 2024-05-10 RX ADMIN — MIDAZOLAM 0.5 MG: 1 INJECTION INTRAMUSCULAR; INTRAVENOUS at 14:49

## 2024-05-10 RX ADMIN — FUROSEMIDE 40 MG: 10 INJECTION, SOLUTION INTRAMUSCULAR; INTRAVENOUS at 18:18

## 2024-05-10 RX ADMIN — INSULIN LISPRO 1 UNITS: 100 INJECTION, SOLUTION INTRAVENOUS; SUBCUTANEOUS at 22:57

## 2024-05-10 RX ADMIN — POTASSIUM CHLORIDE 20 MEQ: 14.9 INJECTION, SOLUTION INTRAVENOUS at 19:44

## 2024-05-10 RX ADMIN — DEXTROSE MONOHYDRATE 2 G: 5 INJECTION INTRAVENOUS at 11:37

## 2024-05-10 RX ADMIN — FUROSEMIDE 40 MG: 10 INJECTION, SOLUTION INTRAMUSCULAR; INTRAVENOUS at 14:51

## 2024-05-10 RX ADMIN — POTASSIUM CHLORIDE 20 MEQ: 14.9 INJECTION, SOLUTION INTRAVENOUS at 17:09

## 2024-05-10 RX ADMIN — AZITHROMYCIN MONOHYDRATE 500 MG: 500 INJECTION, POWDER, LYOPHILIZED, FOR SOLUTION INTRAVENOUS at 11:37

## 2024-05-10 RX ADMIN — MIDAZOLAM HYDROCHLORIDE 0.5 MG: 1 INJECTION, SOLUTION INTRAMUSCULAR; INTRAVENOUS at 14:49

## 2024-05-10 RX ADMIN — VANCOMYCIN HYDROCHLORIDE 1.5 G: 1.5 INJECTION, POWDER, LYOPHILIZED, FOR SOLUTION INTRAVENOUS at 11:31

## 2024-05-10 RX ADMIN — SODIUM CHLORIDE, POTASSIUM CHLORIDE, SODIUM LACTATE AND CALCIUM CHLORIDE 1500 ML: 600; 310; 30; 20 INJECTION, SOLUTION INTRAVENOUS at 12:57

## 2024-05-10 ASSESSMENT — ENCOUNTER SYMPTOMS
APPETITE CHANGE: 1
FEVER: 0
ACTIVITY CHANGE: 1
DIARRHEA: 1
COUGH: 1
CHILLS: 0
SHORTNESS OF BREATH: 1
ABDOMINAL PAIN: 0
HEADACHES: 0
NAUSEA: 0

## 2024-05-10 ASSESSMENT — COLUMBIA-SUICIDE SEVERITY RATING SCALE - C-SSRS
2. HAVE YOU ACTUALLY HAD ANY THOUGHTS OF KILLING YOURSELF?: NO
1. IN THE PAST MONTH, HAVE YOU WISHED YOU WERE DEAD OR WISHED YOU COULD GO TO SLEEP AND NOT WAKE UP?: NO
6. HAVE YOU EVER DONE ANYTHING, STARTED TO DO ANYTHING, OR PREPARED TO DO ANYTHING TO END YOUR LIFE?: NO

## 2024-05-10 ASSESSMENT — PAIN SCALES - GENERAL
PAINLEVEL_OUTOF10: 0 - NO PAIN

## 2024-05-10 ASSESSMENT — LIFESTYLE VARIABLES
EVER FELT BAD OR GUILTY ABOUT YOUR DRINKING: NO
HAVE YOU EVER FELT YOU SHOULD CUT DOWN ON YOUR DRINKING: NO
HAVE PEOPLE ANNOYED YOU BY CRITICIZING YOUR DRINKING: NO
TOTAL SCORE: 0
EVER HAD A DRINK FIRST THING IN THE MORNING TO STEADY YOUR NERVES TO GET RID OF A HANGOVER: NO

## 2024-05-10 ASSESSMENT — PAIN - FUNCTIONAL ASSESSMENT
PAIN_FUNCTIONAL_ASSESSMENT: 0-10

## 2024-05-10 NOTE — PROGRESS NOTES
Respiratory Therapy Note  Venous blood gas results     Latest Reference Range & Units 05/10/24 11:17   POCT pH, Venous 7.33 - 7.43 pH 7.30 (L)   POCT pCO2, Venous 41 - 51 mm Hg 34 (L)   POCT pO2, Venous 35 - 45 mm Hg 44   POCT SO2, Venous 45 - 75 % 69   POCT Oxy Hemoglobin, Venous 45.0 - 75.0 % 68.2   POCT Hematocrit Calculated, Venous 36.0 - 46.0 % 34.0 (L)   POCT Sodium, Venous 136 - 145 mmol/L 136   POCT Potassium, Venous 3.5 - 5.3 mmol/L 3.6   POCT Chloride, Venous 98 - 107 mmol/L 109 (H)   POCT Ionized Calicum, Venous 1.10 - 1.33 mmol/L 1.31   POCT Glucose, Venous 74 - 99 mg/dL 183 (H)   POCT Lactate, Venous 0.4 - 2.0 mmol/L 2.7 (H)   POCT Base Excess, Venous -2.0 - 3.0 mmol/L -8.8 (L)   POCT HCO3 Calculated, Venous 22.0 - 26.0 mmol/L 16.7 (L)   POCT Hemoglobin, Venous 12.0 - 16.0 g/dL 11.3 (L)   POCT Anion Gap, Venous 10.0 - 25.0 mmol/L 14.0   FiO2 % 50   Patient Temperature  COMMENT ONLY   (L): Data is abnormally low  (H): Data is abnormally high

## 2024-05-10 NOTE — H&P
History and Physical Note  Patient: Merna Jaffe   Age: 88 y.o. Gender: female     History of Present Illness:   Admission Reason:   Chief Complaint   Patient presents with    Shortness of Breath     Increasing sob over the past day     HPI:   Merna Jaffe is a 88 y.o. year old female with PMH HTN presented to the ED with SOB for the past week. She has non productive cough with loose stools. She denies CP, fever, chills, abdominal pain, N/V. Her SOB worsened that she came to the ED. In the ED, she is found to have Sp O2 to 70s on RA. She is put on 5L NC. There is concern for pneumonia on imaging. She is given IV abx. Hospitalist is contacted for admission.     Review of Systems:  Review of Systems   Constitutional:  Positive for activity change and appetite change. Negative for chills and fever.   Respiratory:  Positive for cough and shortness of breath.    Cardiovascular:  Negative for chest pain.   Gastrointestinal:  Positive for diarrhea. Negative for abdominal pain and nausea.   Neurological:  Negative for headaches.   All other systems reviewed and are negative.      Allergies:   Allergies   Allergen Reactions    Amoxicillin Unknown       Past Medical History:  No past medical history on file.    Past Surgical History:   No past surgical history on file.    Family History:  No family history on file.    Social History:  Social History     Tobacco Use   Smoking Status Not on file   Smokeless Tobacco Not on file       Social History     Substance and Sexual Activity   Alcohol Use Not on file       Social History     Substance and Sexual Activity   Drug Use Not on file       Home Medications:  No current outpatient medications    Vitals:  Visit Vitals  /69   Pulse 91   Temp 37.2 °C (99 °F) (Temporal)   Resp 20   Ht 1.524 m (5')   Wt 49.9 kg (110 lb)   SpO2 94%   BMI 21.48 kg/m²   BSA 1.45 m²       Physical Exam  Vitals and nursing note reviewed.   Constitutional:       General: She is not in acute  distress.     Appearance: She is ill-appearing. She is not toxic-appearing.   HENT:      Head: Normocephalic and atraumatic.      Nose: Nose normal.      Mouth/Throat:      Mouth: Mucous membranes are moist.   Eyes:      Extraocular Movements: Extraocular movements intact.      Conjunctiva/sclera: Conjunctivae normal.      Pupils: Pupils are equal, round, and reactive to light.   Cardiovascular:      Rate and Rhythm: Normal rate and regular rhythm.      Heart sounds: No murmur heard.     No gallop.   Pulmonary:      Effort: No respiratory distress.      Breath sounds: Wheezing and rhonchi present. No rales.      Comments: + conversational dyspnea  Abdominal:      General: Abdomen is flat. Bowel sounds are normal. There is no distension.      Palpations: Abdomen is soft. There is no mass.      Tenderness: There is no abdominal tenderness.   Musculoskeletal:         General: No swelling or tenderness. Normal range of motion.      Cervical back: Normal range of motion and neck supple.   Skin:     General: Skin is warm and dry.   Neurological:      Mental Status: She is alert and oriented to person, place, and time.      Motor: Weakness present.   Psychiatric:         Mood and Affect: Mood normal.         Behavior: Behavior normal.         Thought Content: Thought content normal.         Judgment: Judgment normal.         Labs and Imaging Results:  Lab Results   Component Value Date    WBC 14.4 (H) 05/10/2024       CT chest wo IV contrast  Narrative: Interpreted By:  Festus Mckenna,   STUDY:  CT CHEST WO IV CONTRAST;  5/10/2024 12:46 pm      INDICATION:  87 y/o   F with  Signs/Symptoms:concern for PE.      LIMITATIONS:  Imaging of the mediastinum and dyllan is limited without the use of IV  contrast..      ACCESSION NUMBER(S):  [id number]      ORDERING CLINICIAN:  ROGE FRENCH      TECHNIQUE:  Noncontrast Thin-section images were obtained  from the thoracic  inlet down through the diaphragm. Sagittal and coronal  reconstruction  images were generated. Mediastinal, lung, bone, and liver windows  were reviewed.      COMPARISON:      Correlation with chest x-ray from earlier this morning.      FINDINGS:  CHEST WALL/BASE OF THE NECK:  The chest wall was grossly intact.  No thyromegaly or thyroid mass.      LUNGS/ PLEURA/ AND TRACHEA:  No pleural effusion.  No pneumothorax.  Mild diffuse bronchial wall thickening, most pronounced in the right  lower lobe and right middle lobe. There are diffuse reticulonodular  infiltrative densities throughout both lungs, along with more  confluent areas of infiltrative density in the anterolateral base of  the right upper lobe, and anteriorly and laterally in the mid and  lower left upper lobe. The trachea was intact.      MEDIASTINUM/BRANDEN:  Mild mediastinal adenopathy with lymph nodes measuring 14 mm AP or  less. Cannot accurately assess either hilum in this unenhanced exam.  No axillary adenopathy. Moderate-sized hiatal hernia involving the  gastric fundus. No cardiomegaly.  No pericardial effusion.  No thoracic aortic aneurysm.      BONES:  No destructive lytic or blastic bone lesion. There is mild mid and  distal thoracic spine disc space narrowing with vacuum disc  phenomenon. Very mild endplate osteophytosis in the distal thoracic  spine.      UPPER ABDOMEN:  Imaging of the upper abdomen is limited due to patient motion.  However, the imaged upper abdomen was grossly intact.      Impression: Findings of bronchitis and acute bilateral pneumonia as described.      Mild nonspecific mediastinal adenopathy, perhaps  infectious/inflammatory or reactive. Cannot accurately assess either  hilum without IV contrast.      Moderate-sized hiatal hernia.      Mild arthritic changes in the mid to distal thoracic spine.      MACRO:  None      Signed by: Festus Mckenna 5/10/2024 1:12 PM  Dictation workstation:   [id number]  XR chest 1 view  Narrative: Interpreted By:  Simone Fernandez,   STUDY:  XR CHEST  1 VIEW; 5/10/2024 11:41 am      INDICATION:  Signs/Symptoms:sob.      COMPARISON:  12/02/2011      ACCESSION NUMBER(S):  [id number]      ORDERING CLINICIAN:  ROGE FRENCH      TECHNIQUE:  1 view of the chest was performed.      FINDINGS:  The lungs are adequately inflated. No significant consolidation  however there is suggestion of mild patchy ground-glass opacity and  suggestion of minimal alveolar consolidation in the mid left lung  which can be seen with atypical pneumonia, or potentially early  pneumonia. No pleural effusion. No pneumothorax.  The  cardiomediastinal silhouette is within normal limits.      Impression: Suggestion of mild bilateral patchy nonspecific ground-glass opacity.  There is also suggestion of minimal alveolar consolidation in the mid  left lung. Findings can be seen with atypical pneumonia or possibly  early pneumonia.      Signed by: Simone Fernandez 5/10/2024 12:17 PM  Dictation workstation:   [id number]  ECG 12 lead  Sinus tachycardia with Premature supraventricular complexes  Possible Left atrial enlargement  Nonspecific ST abnormality  Abnormal ECG  No previous ECGs available  See ED provider note for full interpretation and clinical correlation  Confirmed by Victoria Gomez ([id number]) on 5/10/2024 11:45:22 AM      Assessment and Plan:    Principal Problem:    Pneumonia of both lungs due to infectious organism, unspecified part of lung      Patient Active Problem List   Diagnosis    Pneumonia of both lungs due to infectious organism, unspecified part of lung     Acute hypoxic respiratory failure 2/2 bilateral pneumonia 2/2 CAP  - Admit to tele  - Supplemental O2, currently on 5L NC, wean as tolerate  - CTX/AZT  - Uag  - check procal    SANJANA  - IVF    HypoK  - replace    Elevated trop 2/2 demand ischemia  - stop trend    Leukocytosis  - monitor    Normocytic anemia  - monitor    HTN  - need to verify home meds    GERD  - nexium    Depression  - cymbalta    Diet: regular  DVT ppx:  lovenox  Dispo: admit under inpatient status anticipating greater than forty-eight hours or two midnights stay.       Elizabeth Armando MD  Hospitalist

## 2024-05-10 NOTE — DOCUMENTATION CLARIFICATION NOTE
PATIENT:               MOE ESCOTO  ACCT #:                  [id number]  MRN:                       [id number]  :                       1935  ADMIT DATE:       5/10/2024 10:58 AM  DISCH DATE:  RESPONDING PROVIDER #:        [id number]          PROVIDER RESPONSE TEXT:    Sepsis with associated acute multi-system organ dysfunction SANJANA, elevated blood glucose with no history of diabetes, and Non ischemic myocardial injury    CDI QUERY TEXT:    Clarification    Instruction:    Based on your assessment of the patient and the clinical information, please provide the requested documentation by clicking on the appropriate radio button and enter any additional information if prompted.    Question: Please further clarify if a relationship exists between the Sepsis and acute organ dysfunction    When answering this query, please exercise your independent professional judgment. The fact that a question is being asked, does not imply that any particular answer is desired or expected.    The patient's clinical indicators include:  Clinical Information: 87 yo female admitted with sepsis, acute hypoxic respiratory failure, acute pulmonary edema, elevated troponin, SANJANA on CKD3, and pneumonia.    Clinical Indicators:  Vitals (5/10 1105) Temp-37.2 HR-105 RR-22 BP-149/89 SPO2- 95 5L    WBC: 14.4 (5/10 111)  Neutrophils: 10.66 (5/10 1117)  Creatinine: 2.10 (5/10 1117), baseline   1.6  B (5/10 111), 274 (5/10 1542)  Troponin: 26 (5/10 1117), 25 (5/10 1221)  EKG: Sinus tachycardia with Premature supraventricular complexes, Possible Left atrial enlargement, Nonspecific ST  abnormality, Abnormal ECG (5/10)    CXR: IMPRESSION: (5/10 1141) Suggestion of mild bilateral patchy nonspecific ground-glass opacity. There is also suggestion of minimal alveolar consolidation in the mid left lung. Findings can be seen with atypical pneumonia or possibly early pneumonia.  CT chest: IMPRESSION: Findings of bronchitis and acute bilateral  "pneumonia as described.    5/10 ED Dr. Chapa: \"Dx: Sepsis. Patient has crackles in all lung fields and I am worried that she may have developed heart failure but also could have pneumonia. We activated her as a sepsis alert and gave her broad-spectrum antibiotics but I held off on fluids until I can confirm if she did not have heart failure based on her workup.\"    Treatment: Vancomycin, Rocephin, Zithromax, LR bolus 1500 ml    Risk Factors: PNA, SIRS  Options provided:  -- Sepsis with associated acute renal organ dysfunction SANJANA  -- Sepsis with associated acute cardiac organ dysfunction of Non ischemic myocardial injury  -- Sepsis with associated acute endocrine organ dysfunction of elevated blood glucose with no history of diabetes  -- Sepsis with associated acute multi-system organ dysfunction SANJANA, elevated blood glucose with no history of diabetes, and Non ischemic myocardial injury  -- Sepsis with other associated acute organ dysfunction, Please specify additional information below  -- Sepsis ruled out  -- Other - I will add my own diagnosis  -- Refer to Clinical Documentation Reviewer    Query created by: Quita Urbina on 5/10/2024 5:36 PM      Electronically signed by:  SONIA GORDON-CNP 5/10/2024 6:49 PM          "

## 2024-05-10 NOTE — ED PROVIDER NOTES
HPI: The patient is a 88-year-old woman with history of hypertension but who has no history of smoking or COPD or asthma who presents to the Emergency Department with a chief complaint of shortness of breath for the last week as well as diarrhea.  Patient reports that she has had symptoms since she saw her regular doctor for an ear infection.  She reports that she has had persistent watery diarrhea but no fever or abdominal pain or chest pain.  She reports that she has had shortness of breath as well but reports that none of her symptoms have suddenly worsened today but got to the point where she thought she needed evaluation so EMS was called and she was transported here.  EMS gave her breathing treatments and route.  Patient denies any unexplained weight gain or swelling her legs.  No fever.  No productive cough.  No hemoptysis or history of DVT or PE.  Denies any headache photophobia or neck stiffness.     PAST MEDICAL HISTORY:  as per HPI  ALLERGIES:  as per HPI  MEDICATIONS:  as per HPI  FAMILY HISTORY: as per HPI  SURGICAL HISTORY: as per HPI  SOCIAL HISTORY: as per HPI     PHYSICAL EXAM:  VITAL SIGNS: Nursing notes reviewed.  GENERAL:  Alert and interactive  EYES:   Eyes track.  ENT:  Airway patent.  RESPIRATORY: Tachypnea.  Rhonchi and crackles in all lung fields but predominantly in the bases bilaterally.  CARDIOVASCULAR: Tachycardic [Regular rhythm.]  No murmur.  GASTROINTESTINAL:  No distension.  Nontender.  No rebound rigidity guarding.  Negative Hinds's  MUSCULOSKELETAL:  No deformity.  No pitting edema or swelling the bilateral lower extremities.  Radial pulses equal bilaterally dorsalis pedis pulses equal bilaterally.  NEUROLOGICAL:  Awake.  SKIN:  Dry.        MEDICAL DECISION MAKING (MDM):     Critical Care Time  Authorized and Performed by: Ranjeet Chapa MD  Total critical care time: [32] minutes  Due to a high probability of clinically significant, life threatening deterioration, the patient  required my highest level of preparedness to intervene emergently and I personally spent this critical care time directly and personally managing the patient. This critical care time included obtaining a history; examining the patient; pulse oximetry; ordering and review of studies; arranging urgent treatment with development of a management plan; evaluation of patient's response to treatment; frequent reassessment; and, discussions with other providers and patient/family.  This critical care time was performed to assess and manage the high probability of imminent, life-threatening deterioration that could result in multi-organ failure. It was exclusive of separately billable procedures and treating other patients and teaching time.  Please see MDM section and the rest of the note for further information on patient assessment and treatment.    EKG 1107  Per my interpretation:  Electrocardiogram ECG  RATE: 107  RHYTHM: [Sinus]  AXIS: [Normal]  INTERVALS: [Normal]  ST-T WAVE CHANGES: Nonspecific changes  ABNORMALITIES/COMPARISON: No previous EKG to compare with.       SUMMARY:  The patient is admitted to the Emergency Department for evaluation of above. Complete history and physical examination was performed by me.  Patient presents with 1 week of diarrhea and worsening shortness of breath.  Patient has crackles in all lung fields and I am worried that she may have developed heart failure but also could have pneumonia.  We activated her as a sepsis alert and gave her broad-spectrum antibiotics but I held off on fluids until I can confirm if she did not have heart failure based on her workup.  She was perfusing well and had reassuring blood pressures so I felt that the risk of fluids outweigh the benefits at this point.  I also sent her for CT pulmonary angiogram.  Viral swabs were sent.  EKG not consistent with occlusive MI.  I considered valvular dysfunction but think this is less likely based on physical exam.   Patient has a new O2 requirement and is on 4 L consistent with respiratory failure.    Per my interpretation the patient's chest x-ray, she looks like she has multifocal pneumonia.  I changed her CT to a CT without contrast given my suspicion for PE is low and this confirmed a multifocal pneumonia as well as showed a small effusion at the base of the left lung.  Patient's blood work shows a leukocytosis, mild anemia but also shows an elevated creatinine consistent with dehydration.  I did give her 30 cc/kg fluids given the dehydration.  She has slightly elevated troponin and BNP but I think this is likely demand ischemia and not due to occlusive MI or acute CHF.  She did not have COVID RSV or flu.  Lactate was slightly elevated and this is being treated with fluids but she has no evidence of hypoperfusion on repeat examination of her.  I believe she safe for admission to floor and spoke with Dr. Armando who accepted her for further evaluation and care.    Just before the patient was transported to the floor, I noticed that the patient's respiratory status had worsened and I reassessed her and was worried that she was developing flash pulmonary edema in the setting of the fluid she had received.  I got a repeat chest x-ray and it did look like there was more evidence of fluid overload and I did a bedside ultrasound which showed distention of the IVC as well as no evidence of pericardial effusion or signs of acute right heart failure.  I am worried that she is developed some cardiac dysfunction in the setting of pneumonia so I gave her half milligram of Versed due to her level of anxiety and placed her on BiPAP.  I also gave her 40 of Lasix.  I touch base with hospital service about my plan to admit to the ICU and then call the ICU to discuss taking over patient's care.     DIAGNOSIS:    Pneumonia  Respiratory failure  Sepsis  Acute kidney injury     DISPOSITION:    1) admission     Ranjeet Chapa MD  05/10/24 [id number]

## 2024-05-11 ENCOUNTER — APPOINTMENT (OUTPATIENT)
Dept: RADIOLOGY | Facility: HOSPITAL | Age: 89
DRG: 871 | End: 2024-05-11
Payer: MEDICARE

## 2024-05-11 ENCOUNTER — APPOINTMENT (OUTPATIENT)
Dept: CARDIOLOGY | Facility: HOSPITAL | Age: 89
DRG: 871 | End: 2024-05-11
Payer: MEDICARE

## 2024-05-11 LAB
ANION GAP SERPL CALC-SCNC: 16 MMOL/L (ref 10–20)
AORTIC VALVE MEAN GRADIENT: 4 MMHG
AORTIC VALVE PEAK VELOCITY: 1.38 M/S
AV PEAK GRADIENT: 7.6 MMHG
AVA (PEAK VEL): 2.06 CM2
AVA (VTI): 2.37 CM2
BUN SERPL-MCNC: 62 MG/DL (ref 6–23)
CALCIUM SERPL-MCNC: 9.5 MG/DL (ref 8.6–10.3)
CHLORIDE SERPL-SCNC: 106 MMOL/L (ref 98–107)
CO2 SERPL-SCNC: 18 MMOL/L (ref 21–32)
CREAT SERPL-MCNC: 2.13 MG/DL (ref 0.5–1.05)
EGFRCR SERPLBLD CKD-EPI 2021: 22 ML/MIN/1.73M*2
EJECTION FRACTION APICAL 4 CHAMBER: 56.8
ERYTHROCYTE [DISTWIDTH] IN BLOOD BY AUTOMATED COUNT: 13.9 % (ref 11.5–14.5)
GLUCOSE BLD MANUAL STRIP-MCNC: 156 MG/DL (ref 74–99)
GLUCOSE BLD MANUAL STRIP-MCNC: 157 MG/DL (ref 74–99)
GLUCOSE BLD MANUAL STRIP-MCNC: 161 MG/DL (ref 74–99)
GLUCOSE BLD MANUAL STRIP-MCNC: 165 MG/DL (ref 74–99)
GLUCOSE SERPL-MCNC: 169 MG/DL (ref 74–99)
HCT VFR BLD AUTO: 30.6 % (ref 36–46)
HGB BLD-MCNC: 10.3 G/DL (ref 12–16)
HOLD SPECIMEN: NORMAL
LEFT ATRIUM VOLUME AREA LENGTH INDEX BSA: 18.8 ML/M2
LEFT VENTRICLE INTERNAL DIMENSION DIASTOLE: 3.63 CM (ref 3.5–6)
LEFT VENTRICULAR OUTFLOW TRACT DIAMETER: 2 CM
LEGIONELLA AG UR QL: NEGATIVE
LV EJECTION FRACTION BIPLANE: 57 %
MAGNESIUM SERPL-MCNC: 2.04 MG/DL (ref 1.6–2.4)
MCH RBC QN AUTO: 32.3 PG (ref 26–34)
MCHC RBC AUTO-ENTMCNC: 33.7 G/DL (ref 32–36)
MCV RBC AUTO: 96 FL (ref 80–100)
MITRAL VALVE E/A RATIO: 0.75
MITRAL VALVE E/E' RATIO: 7.88
NRBC BLD-RTO: 0 /100 WBCS (ref 0–0)
PLATELET # BLD AUTO: 298 X10*3/UL (ref 150–450)
POTASSIUM SERPL-SCNC: 3.3 MMOL/L (ref 3.5–5.3)
PROCALCITONIN SERPL-MCNC: 2.23 NG/ML
RBC # BLD AUTO: 3.19 X10*6/UL (ref 4–5.2)
RIGHT VENTRICLE FREE WALL PEAK S': 20.1 CM/S
RIGHT VENTRICLE PEAK SYSTOLIC PRESSURE: 33.7 MMHG
S PNEUM AG UR QL: NEGATIVE
SODIUM SERPL-SCNC: 137 MMOL/L (ref 136–145)
TRICUSPID ANNULAR PLANE SYSTOLIC EXCURSION: 2 CM
WBC # BLD AUTO: 17.6 X10*3/UL (ref 4.4–11.3)

## 2024-05-11 PROCEDURE — 94640 AIRWAY INHALATION TREATMENT: CPT

## 2024-05-11 PROCEDURE — 2500000002 HC RX 250 W HCPCS SELF ADMINISTERED DRUGS (ALT 637 FOR MEDICARE OP, ALT 636 FOR OP/ED): Mod: MUE | Performed by: NURSE PRACTITIONER

## 2024-05-11 PROCEDURE — 2500000001 HC RX 250 WO HCPCS SELF ADMINISTERED DRUGS (ALT 637 FOR MEDICARE OP): Performed by: NURSE PRACTITIONER

## 2024-05-11 PROCEDURE — 2500000004 HC RX 250 GENERAL PHARMACY W/ HCPCS (ALT 636 FOR OP/ED): Performed by: EMERGENCY MEDICINE

## 2024-05-11 PROCEDURE — 82947 ASSAY GLUCOSE BLOOD QUANT: CPT

## 2024-05-11 PROCEDURE — 94660 CPAP INITIATION&MGMT: CPT

## 2024-05-11 PROCEDURE — 99232 SBSQ HOSP IP/OBS MODERATE 35: CPT | Performed by: NURSE PRACTITIONER

## 2024-05-11 PROCEDURE — 2500000004 HC RX 250 GENERAL PHARMACY W/ HCPCS (ALT 636 FOR OP/ED): Performed by: NURSE PRACTITIONER

## 2024-05-11 PROCEDURE — 36415 COLL VENOUS BLD VENIPUNCTURE: CPT | Performed by: NURSE PRACTITIONER

## 2024-05-11 PROCEDURE — 85027 COMPLETE CBC AUTOMATED: CPT | Performed by: NURSE PRACTITIONER

## 2024-05-11 PROCEDURE — 2500000004 HC RX 250 GENERAL PHARMACY W/ HCPCS (ALT 636 FOR OP/ED): Performed by: STUDENT IN AN ORGANIZED HEALTH CARE EDUCATION/TRAINING PROGRAM

## 2024-05-11 PROCEDURE — 1200000002 HC GENERAL ROOM WITH TELEMETRY DAILY

## 2024-05-11 PROCEDURE — 82947 ASSAY GLUCOSE BLOOD QUANT: CPT | Mod: 91

## 2024-05-11 PROCEDURE — 83735 ASSAY OF MAGNESIUM: CPT | Performed by: NURSE PRACTITIONER

## 2024-05-11 PROCEDURE — 71045 X-RAY EXAM CHEST 1 VIEW: CPT

## 2024-05-11 PROCEDURE — 93306 TTE W/DOPPLER COMPLETE: CPT

## 2024-05-11 PROCEDURE — 80048 BASIC METABOLIC PNL TOTAL CA: CPT | Performed by: NURSE PRACTITIONER

## 2024-05-11 PROCEDURE — 93306 TTE W/DOPPLER COMPLETE: CPT | Performed by: INTERNAL MEDICINE

## 2024-05-11 PROCEDURE — 71045 X-RAY EXAM CHEST 1 VIEW: CPT | Performed by: RADIOLOGY

## 2024-05-11 RX ORDER — ONDANSETRON HYDROCHLORIDE 2 MG/ML
4 INJECTION, SOLUTION INTRAVENOUS EVERY 6 HOURS PRN
Status: DISCONTINUED | OUTPATIENT
Start: 2024-05-11 | End: 2024-05-17 | Stop reason: HOSPADM

## 2024-05-11 RX ORDER — POTASSIUM CHLORIDE 1.5 G/1.58G
40 POWDER, FOR SOLUTION ORAL ONCE
Status: COMPLETED | OUTPATIENT
Start: 2024-05-11 | End: 2024-05-11

## 2024-05-11 RX ORDER — FUROSEMIDE 10 MG/ML
40 INJECTION INTRAMUSCULAR; INTRAVENOUS ONCE
Status: COMPLETED | OUTPATIENT
Start: 2024-05-11 | End: 2024-05-11

## 2024-05-11 RX ORDER — IPRATROPIUM BROMIDE AND ALBUTEROL SULFATE 2.5; .5 MG/3ML; MG/3ML
3 SOLUTION RESPIRATORY (INHALATION) 3 TIMES DAILY
Status: DISCONTINUED | OUTPATIENT
Start: 2024-05-12 | End: 2024-05-15

## 2024-05-11 RX ORDER — INSULIN LISPRO 100 [IU]/ML
0-5 INJECTION, SOLUTION INTRAVENOUS; SUBCUTANEOUS
Status: DISCONTINUED | OUTPATIENT
Start: 2024-05-11 | End: 2024-05-17 | Stop reason: HOSPADM

## 2024-05-11 RX ADMIN — IPRATROPIUM BROMIDE AND ALBUTEROL SULFATE 3 ML: 2.5; .5 SOLUTION RESPIRATORY (INHALATION) at 00:58

## 2024-05-11 RX ADMIN — IPRATROPIUM BROMIDE AND ALBUTEROL SULFATE 3 ML: 2.5; .5 SOLUTION RESPIRATORY (INHALATION) at 07:04

## 2024-05-11 RX ADMIN — ENOXAPARIN SODIUM 30 MG: 30 INJECTION SUBCUTANEOUS at 16:37

## 2024-05-11 RX ADMIN — PANTOPRAZOLE SODIUM 40 MG: 40 TABLET, DELAYED RELEASE ORAL at 06:44

## 2024-05-11 RX ADMIN — INSULIN LISPRO 1 UNITS: 100 INJECTION, SOLUTION INTRAVENOUS; SUBCUTANEOUS at 20:17

## 2024-05-11 RX ADMIN — IPRATROPIUM BROMIDE AND ALBUTEROL SULFATE 3 ML: 2.5; .5 SOLUTION RESPIRATORY (INHALATION) at 19:59

## 2024-05-11 RX ADMIN — INSULIN LISPRO 1 UNITS: 100 INJECTION, SOLUTION INTRAVENOUS; SUBCUTANEOUS at 11:54

## 2024-05-11 RX ADMIN — INSULIN LISPRO 1 UNITS: 100 INJECTION, SOLUTION INTRAVENOUS; SUBCUTANEOUS at 06:44

## 2024-05-11 RX ADMIN — FUROSEMIDE 40 MG: 10 INJECTION, SOLUTION INTRAMUSCULAR; INTRAVENOUS at 06:44

## 2024-05-11 RX ADMIN — ONDANSETRON 4 MG: 2 INJECTION INTRAMUSCULAR; INTRAVENOUS at 11:54

## 2024-05-11 RX ADMIN — IPRATROPIUM BROMIDE AND ALBUTEROL SULFATE 3 ML: 2.5; .5 SOLUTION RESPIRATORY (INHALATION) at 13:07

## 2024-05-11 RX ADMIN — POTASSIUM CHLORIDE 40 MEQ: 1.5 POWDER, FOR SOLUTION ORAL at 06:44

## 2024-05-11 RX ADMIN — INSULIN LISPRO 1 UNITS: 100 INJECTION, SOLUTION INTRAVENOUS; SUBCUTANEOUS at 16:37

## 2024-05-11 RX ADMIN — VENLAFAXINE HYDROCHLORIDE 150 MG: 150 CAPSULE, EXTENDED RELEASE ORAL at 08:23

## 2024-05-11 RX ADMIN — CEFTRIAXONE SODIUM 2 G: 2 INJECTION, POWDER, FOR SOLUTION INTRAMUSCULAR; INTRAVENOUS at 11:54

## 2024-05-11 SDOH — SOCIAL STABILITY: SOCIAL INSECURITY: DO YOU FEEL UNSAFE GOING BACK TO THE PLACE WHERE YOU ARE LIVING?: NO

## 2024-05-11 SDOH — SOCIAL STABILITY: SOCIAL INSECURITY: ARE YOU OR HAVE YOU BEEN THREATENED OR ABUSED PHYSICALLY, EMOTIONALLY, OR SEXUALLY BY ANYONE?: NO

## 2024-05-11 SDOH — SOCIAL STABILITY: SOCIAL INSECURITY: WERE YOU ABLE TO COMPLETE ALL THE BEHAVIORAL HEALTH SCREENINGS?: YES

## 2024-05-11 SDOH — SOCIAL STABILITY: SOCIAL INSECURITY: HAVE YOU HAD THOUGHTS OF HARMING ANYONE ELSE?: NO

## 2024-05-11 SDOH — SOCIAL STABILITY: SOCIAL INSECURITY: ARE THERE ANY APPARENT SIGNS OF INJURIES/BEHAVIORS THAT COULD BE RELATED TO ABUSE/NEGLECT?: NO

## 2024-05-11 SDOH — SOCIAL STABILITY: SOCIAL INSECURITY: ABUSE: ADULT

## 2024-05-11 SDOH — SOCIAL STABILITY: SOCIAL INSECURITY: DO YOU FEEL ANYONE HAS EXPLOITED OR TAKEN ADVANTAGE OF YOU FINANCIALLY OR OF YOUR PERSONAL PROPERTY?: NO

## 2024-05-11 SDOH — SOCIAL STABILITY: SOCIAL INSECURITY: DOES ANYONE TRY TO KEEP YOU FROM HAVING/CONTACTING OTHER FRIENDS OR DOING THINGS OUTSIDE YOUR HOME?: NO

## 2024-05-11 SDOH — SOCIAL STABILITY: SOCIAL INSECURITY: HAVE YOU HAD ANY THOUGHTS OF HARMING ANYONE ELSE?: NO

## 2024-05-11 SDOH — SOCIAL STABILITY: SOCIAL INSECURITY: HAS ANYONE EVER THREATENED TO HURT YOUR FAMILY OR YOUR PETS?: NO

## 2024-05-11 ASSESSMENT — COGNITIVE AND FUNCTIONAL STATUS - GENERAL
STANDING UP FROM CHAIR USING ARMS: A LOT
MOBILITY SCORE: 17
MOVING FROM LYING ON BACK TO SITTING ON SIDE OF FLAT BED WITH BEDRAILS: A LITTLE
MOVING TO AND FROM BED TO CHAIR: A LITTLE
STANDING UP FROM CHAIR USING ARMS: A LITTLE
HELP NEEDED FOR BATHING: A LOT
MOVING TO AND FROM BED TO CHAIR: A LITTLE
TURNING FROM BACK TO SIDE WHILE IN FLAT BAD: A LITTLE
CLIMB 3 TO 5 STEPS WITH RAILING: A LITTLE
TOILETING: A LITTLE
CLIMB 3 TO 5 STEPS WITH RAILING: A LOT
DRESSING REGULAR UPPER BODY CLOTHING: A LOT
WALKING IN HOSPITAL ROOM: A LOT
PATIENT BASELINE BEDBOUND: NO
MOBILITY SCORE: 16
TURNING FROM BACK TO SIDE WHILE IN FLAT BAD: A LITTLE
EATING MEALS: A LITTLE
WALKING IN HOSPITAL ROOM: A LITTLE
PERSONAL GROOMING: A LITTLE
DAILY ACTIVITIY SCORE: 16
MOVING FROM LYING ON BACK TO SITTING ON SIDE OF FLAT BED WITH BEDRAILS: A LITTLE
DRESSING REGULAR LOWER BODY CLOTHING: A LITTLE

## 2024-05-11 ASSESSMENT — LIFESTYLE VARIABLES
HOW MANY STANDARD DRINKS CONTAINING ALCOHOL DO YOU HAVE ON A TYPICAL DAY: PATIENT DOES NOT DRINK
HOW OFTEN DO YOU HAVE 6 OR MORE DRINKS ON ONE OCCASION: NEVER
HOW OFTEN DO YOU HAVE A DRINK CONTAINING ALCOHOL: NEVER
SKIP TO QUESTIONS 9-10: 1
AUDIT-C TOTAL SCORE: 0
AUDIT-C TOTAL SCORE: 0

## 2024-05-11 ASSESSMENT — PAIN SCALES - GENERAL
PAINLEVEL_OUTOF10: 0 - NO PAIN

## 2024-05-11 ASSESSMENT — PATIENT HEALTH QUESTIONNAIRE - PHQ9
1. LITTLE INTEREST OR PLEASURE IN DOING THINGS: SEVERAL DAYS
SUM OF ALL RESPONSES TO PHQ9 QUESTIONS 1 & 2: 2
2. FEELING DOWN, DEPRESSED OR HOPELESS: SEVERAL DAYS

## 2024-05-11 ASSESSMENT — ACTIVITIES OF DAILY LIVING (ADL)
GROOMING: NEEDS ASSISTANCE
JUDGMENT_ADEQUATE_SAFELY_COMPLETE_DAILY_ACTIVITIES: YES
FEEDING YOURSELF: INDEPENDENT
BATHING: NEEDS ASSISTANCE
LACK_OF_TRANSPORTATION: NO
HEARING - LEFT EAR: FUNCTIONAL
HEARING - RIGHT EAR: FUNCTIONAL
TOILETING: NEEDS ASSISTANCE
DRESSING YOURSELF: INDEPENDENT
ASSISTIVE_DEVICE: WALKER
ADEQUATE_TO_COMPLETE_ADL: YES
PATIENT'S MEMORY ADEQUATE TO SAFELY COMPLETE DAILY ACTIVITIES?: YES
WALKS IN HOME: INDEPENDENT

## 2024-05-11 ASSESSMENT — PAIN - FUNCTIONAL ASSESSMENT
PAIN_FUNCTIONAL_ASSESSMENT: 0-10

## 2024-05-11 NOTE — PROGRESS NOTES
[facility] Critical Care Medicine       Date:  5/11/2024  Patient:  Merna Jaffe  YOB: 1935  MRN:  [id number]   Admit Date:  5/10/2024  ========================================================================================================    Chief Complaint   Patient presents with    Shortness of Breath     Increasing sob over the past day         History of Present Illness:  Merna Jaffe is a 88 y.o. year old female patient with Past Medical History of  HTN, CKD stage III, GERD, chronic anemia, anxiety, depression, insomnia who presented to [facility] emergency department for complaints of shortness of breath. Reports began to have shortness of breath over the last couple of days that worsened today prompting call to EMS for evaluation in the emergency department. At this time was unable to provide a detailed history due to receiving versed in the emergency and falling asleep during exam. Per chart review/ ED provider documentation reports has had shortness of breath and watery diarrhea since seeing provider last week. Was seen on 5/2, treated for otitis externa and had impacted cerumen removal.     ED course:   Initially treated for multifocal pneumonia given 1.5L IV fluid bolus along with ceftriaxone, azithromycin, and vancomycin. Was placed on nasal cannula with plans for admission to general medical floor. Prior to admission to floor developed increase in agitation and shortness of breath with concerns for flash pulmonary edema and was given lasix, versed, and placed on BiPAP.     Interval ICU Events:  5/10: Admitted to the ICU for acute hypoxic respiratory failure, pulmonary edema, and multifocal pneumonia.  5/11: BiPAP 10/5 with FiO2 60% with SPO2 % overnight. Weaned off to room air. FB (-) 900    Medical History:  Past Medical History:   Diagnosis Date    Anxiety     Depression     Hypertension     Insomnia      Past Surgical History:   Procedure Laterality Date    COLONOSCOPY       ESOPHAGOGASTRODUODENOSCOPY      HYSTERECTOMY      NASAL POLYP EXCISION       Medications Prior to Admission   Medication Sig Dispense Refill Last Dose    potassium chloride CR 10 mEq ER tablet Take 1 tablet (10 mEq) by mouth once daily. Do not crush, chew, or split.   Past Week    venlafaxine XR (Effexor-XR) 150 mg 24 hr capsule Take 1 capsule (150 mg) by mouth once daily. Do not crush or chew.   Past Week    ALPRAZolam (Xanax) 0.25 mg tablet Take 1 tablet (0.25 mg) by mouth as needed at bedtime for anxiety.   Unknown     Amoxicillin  Social History     Tobacco Use    Smoking status: Never    Smokeless tobacco: Never   Substance Use Topics    Alcohol use: Never    Drug use: Never     Family History   Problem Relation Name Age of Onset    Heart disease Mother      Heart disease Sister         Hospital Medications:           Current Facility-Administered Medications:     azithromycin (Zithromax) in dextrose 5 % in water (D5W) 250 mL  mg, 500 mg, intravenous, q24h, MISTY Mccoy    cefTRIAXone (Rocephin) in dextrose 5 % water (D5W) 50 mL IV 2 g, 2 g, intravenous, q24h, MISTY Mccoy    dextrose 50 % injection 12.5 g, 12.5 g, intravenous, q15 min PRN, MISTY Mccoy    dextrose 50 % injection 25 g, 25 g, intravenous, q15 min PRN, MISTY Mccoy    enoxaparin (Lovenox) syringe 30 mg, 30 mg, subcutaneous, q24h, Elizabeth Armando MD, 30 mg at 05/10/24 1709    glucagon (Glucagen) injection 1 mg, 1 mg, intramuscular, q15 min PRN, MISTY Mccoy    insulin lispro (HumaLOG) injection 0-5 Units, 0-5 Units, subcutaneous, q6h, MISTY Mccoy, 1 Units at 05/11/24 0644    ipratropium-albuteroL (Duo-Neb) 0.5-2.5 mg/3 mL nebulizer solution 3 mL, 3 mL, nebulization, q6h, MISTY Mccoy, 3 mL at 05/11/24 0704    pantoprazole (ProtoNix) EC tablet 40 mg, 40 mg, oral, Daily before breakfast, Laxmi Adan, APRN-CNP, 40 mg at  05/11/24 0644    venlafaxine XR (Effexor-XR) 24 hr capsule 150 mg, 150 mg, oral, Daily, Laxmi Adan, APRN-CNP    Review of Systems:  14 point review of systems was completed and negative except for those specially mention in my HPI    Physical Exam:    Heart Rate:  []   Temp:  [36 °C (96.8 °F)-37.2 °C (99 °F)]   Resp:  [17-58]   BP: ()/()   Height:  [152.4 cm (5')]   Weight:  [49.9 kg (110 lb)-54.4 kg (119 lb 14.9 oz)]   SpO2:  [94 %-100 %]     Physical Exam  Constitutional:       Comments: Drowsy but arousable   HENT:      Head: Normocephalic.      Mouth/Throat:      Mouth: Mucous membranes are moist.   Eyes:      Extraocular Movements: Extraocular movements intact.      Conjunctiva/sclera: Conjunctivae normal.   Cardiovascular:      Rate and Rhythm: Normal rate and regular rhythm.      Pulses: Normal pulses.      Heart sounds: Normal heart sounds.   Pulmonary:      Effort: Tachypnea present.      Breath sounds: Examination of the right-upper field reveals rhonchi. Examination of the left-upper field reveals rhonchi. Examination of the right-middle field reveals rhonchi. Examination of the left-middle field reveals rhonchi. Examination of the right-lower field reveals rhonchi. Examination of the left-lower field reveals rhonchi. Rhonchi present.   Abdominal:      General: Bowel sounds are normal.      Palpations: Abdomen is soft.   Musculoskeletal:         General: Normal range of motion.   Skin:     General: Skin is warm.      Capillary Refill: Capillary refill takes 2 to 3 seconds.   Neurological:      General: No focal deficit present.   Psychiatric:         Mood and Affect: Mood normal. Affect is tearful.         Behavior: Behavior normal.         Objective:    I have reviewed all medications, laboratory results, and imaging pertinent for today's encounter.    FiO2 (%):  [53 %-70 %] 60 %      Intake/Output Summary (Last 24 hours) at 5/11/2024 0739  Last data filed at 5/11/2024  0600  Gross per 24 hour   Intake 1457 ml   Output 2350 ml   Net -893 ml       Recent Imaging  XR chest 1 view   Final Result   Findings suggestive of pulmonary edema. Correlate clinically.        MACRO:   none        Signed by: Kayla Matias 5/10/2024 2:47 PM   Dictation workstation:   [id number]      CT chest wo IV contrast   Final Result   Findings of bronchitis and acute bilateral pneumonia as described.        Mild nonspecific mediastinal adenopathy, perhaps   infectious/inflammatory or reactive. Cannot accurately assess either   hilum without IV contrast.        Moderate-sized hiatal hernia.        Mild arthritic changes in the mid to distal thoracic spine.        MACRO:   None        Signed by: Festus Mckenna 5/10/2024 1:12 PM   Dictation workstation:   [id number]      XR chest 1 view   Final Result   Suggestion of mild bilateral patchy nonspecific ground-glass opacity.   There is also suggestion of minimal alveolar consolidation in the mid   left lung. Findings can be seen with atypical pneumonia or possibly   early pneumonia.        Signed by: Simone Fernandez 5/10/2024 12:17 PM   Dictation workstation:   [id number]      XR chest 1 view    (Results Pending)   Transthoracic Echo (TTE) Complete    (Results Pending)       No echocardiogram results found for the past 14 days    Assessment/Plan:    I am currently managing this critically ill patient for the following problems:    Neuro/Psych/Pain Ctrl/Sedation:  Anxiety/ depression  Insomnia  CAM-ICU   Delirium precautions  Home Effexor  Pain management as needed    Respiratory/ENT:  Acute hypoxic respiratory failure-resolved  Multifocal pneumonia  Acute pulmonary edema  Oxygen therapy as needed to maintain SPO2 greater then 92%  Duonebs  Antibiotic  therapy-see ID    Cardiovascular:  Acute pulmonary edema  HTN  Not currently on antihypertensive medications  Monitor BP and initiate as needed (per EMR BP in office 120s/80s)  Echo, concern for heart  failure    GI:  GERD  regular diet  Home PPI    Renal/Volume Status (Intra & Extravascular):  SANJANA on CKD III  Baseline Creat ~1.6  Strict I&Os  Trend RFP    Endocrine  Hyperglycemia likely in setting of acute illness  Hgb A1c-5.9  Accu check with SSI    Infectious Disease:  Multifocal pneumonia  Ceftriaxone  Blood culture-pending  Sputum culture  Strep pneumo and legionella-negative  Urine culture-pending  MRSA negative    Heme/Onc:  Chronic anemia  Trend CBC  Monitor for s/s of bleeding    MSK:  PT/ OT     Ethics/Code Status:  FULL    :  DVT Prophylaxis: lovenox  GI Prophylaxis: home PPI  Bowel Regimen: as needed  Diet: regular  CVC: none  Tyesha: none  Lira: none  Restraints: none  Dispo: transfer    35 minutes spent in preparing to see patient (I.e. review of medical records), evaluation of diagnostics (I.e. labs, imaging, etc.), documentation, discussing plan of care with patient/ family/ caregiver, and/ or coordination of care with multidisciplinary team. Time does not include completion of procedure time.       Plan discussed with Dr. Clive Adan, APRN-CNP

## 2024-05-11 NOTE — CARE PLAN
The clinical goals for the shift include maintain SP02 greater than 92% for the remainder of the shift.    Over the shift, the patient did not make progress toward the following goals.     Problem: Pain  Goal: My pain/discomfort is manageable  Outcome: Progressing     Problem: Safety  Goal: Patient will be injury free during hospitalization  Outcome: Progressing  Goal: I will remain free of falls  Outcome: Progressing     Problem: Daily Care  Goal: Daily care needs are met  Outcome: Progressing     Problem: Psychosocial Needs  Goal: Demonstrates ability to cope with hospitalization/illness  Outcome: Progressing  Goal: Collaborate with me, my family, and caregiver to identify my specific goals  Outcome: Progressing  Flowsheets (Taken 5/11/2024 9069)  Cultural Requests During Hospitalization: none  Spiritual Requests During Hospitalization: none     Problem: Discharge Barriers  Goal: My discharge needs are met  Outcome: Progressing     Problem: Nutrition  Goal: Oral intake greater than 50%  Outcome: Progressing  Goal: Consume prescribed supplement  Outcome: Progressing  Goal: Adequate PO fluid intake  Outcome: Progressing  Goal: Nutrition support goals are met within 48 hrs  Outcome: Progressing  Goal: Nutrition support is meeting 75% of nutrient needs  Outcome: Progressing  Goal: BG  mg/dL  Outcome: Progressing  Goal: Lab values WNL  Outcome: Progressing  Goal: Electrolytes WNL  Outcome: Progressing  Goal: Promote healing  Outcome: Progressing  Goal: Maintain stable weight  Outcome: Progressing  Goal: Reduce weight from edema/fluid  Outcome: Progressing

## 2024-05-12 ENCOUNTER — HOSPITAL ENCOUNTER (INPATIENT)
Dept: CARDIOLOGY | Facility: HOSPITAL | Age: 89
Discharge: HOME | DRG: 871 | End: 2024-05-12
Payer: MEDICARE

## 2024-05-12 LAB
ANION GAP SERPL CALC-SCNC: 17 MMOL/L (ref 10–20)
BACTERIA UR CULT: NO GROWTH
BUN SERPL-MCNC: 82 MG/DL (ref 6–23)
CALCIUM SERPL-MCNC: 9.3 MG/DL (ref 8.6–10.3)
CARDIAC TROPONIN I PNL SERPL HS: 10 NG/L (ref 0–13)
CHLORIDE SERPL-SCNC: 108 MMOL/L (ref 98–107)
CO2 SERPL-SCNC: 18 MMOL/L (ref 21–32)
CREAT SERPL-MCNC: 2.51 MG/DL (ref 0.5–1.05)
EGFRCR SERPLBLD CKD-EPI 2021: 18 ML/MIN/1.73M*2
ERYTHROCYTE [DISTWIDTH] IN BLOOD BY AUTOMATED COUNT: 13.9 % (ref 11.5–14.5)
GLUCOSE BLD MANUAL STRIP-MCNC: 114 MG/DL (ref 74–99)
GLUCOSE BLD MANUAL STRIP-MCNC: 117 MG/DL (ref 74–99)
GLUCOSE BLD MANUAL STRIP-MCNC: 125 MG/DL (ref 74–99)
GLUCOSE BLD MANUAL STRIP-MCNC: 138 MG/DL (ref 74–99)
GLUCOSE SERPL-MCNC: 117 MG/DL (ref 74–99)
HCT VFR BLD AUTO: 27.9 % (ref 36–46)
HGB BLD-MCNC: 9.6 G/DL (ref 12–16)
HOLD SPECIMEN: NORMAL
HOLD SPECIMEN: NORMAL
MAGNESIUM SERPL-MCNC: 2.12 MG/DL (ref 1.6–2.4)
MCH RBC QN AUTO: 32.3 PG (ref 26–34)
MCHC RBC AUTO-ENTMCNC: 34.4 G/DL (ref 32–36)
MCV RBC AUTO: 94 FL (ref 80–100)
NRBC BLD-RTO: 0.1 /100 WBCS (ref 0–0)
PLATELET # BLD AUTO: 330 X10*3/UL (ref 150–450)
POTASSIUM SERPL-SCNC: 3.5 MMOL/L (ref 3.5–5.3)
RBC # BLD AUTO: 2.97 X10*6/UL (ref 4–5.2)
SODIUM SERPL-SCNC: 139 MMOL/L (ref 136–145)
WBC # BLD AUTO: 23.6 X10*3/UL (ref 4.4–11.3)

## 2024-05-12 PROCEDURE — 36415 COLL VENOUS BLD VENIPUNCTURE: CPT | Performed by: NURSE PRACTITIONER

## 2024-05-12 PROCEDURE — 97165 OT EVAL LOW COMPLEX 30 MIN: CPT | Mod: GO

## 2024-05-12 PROCEDURE — 2500000001 HC RX 250 WO HCPCS SELF ADMINISTERED DRUGS (ALT 637 FOR MEDICARE OP): Performed by: STUDENT IN AN ORGANIZED HEALTH CARE EDUCATION/TRAINING PROGRAM

## 2024-05-12 PROCEDURE — 97535 SELF CARE MNGMENT TRAINING: CPT | Mod: GO

## 2024-05-12 PROCEDURE — 2500000004 HC RX 250 GENERAL PHARMACY W/ HCPCS (ALT 636 FOR OP/ED): Performed by: NURSE PRACTITIONER

## 2024-05-12 PROCEDURE — 2500000001 HC RX 250 WO HCPCS SELF ADMINISTERED DRUGS (ALT 637 FOR MEDICARE OP): Performed by: NURSE PRACTITIONER

## 2024-05-12 PROCEDURE — 2500000002 HC RX 250 W HCPCS SELF ADMINISTERED DRUGS (ALT 637 FOR MEDICARE OP, ALT 636 FOR OP/ED): Mod: MUE | Performed by: STUDENT IN AN ORGANIZED HEALTH CARE EDUCATION/TRAINING PROGRAM

## 2024-05-12 PROCEDURE — 84484 ASSAY OF TROPONIN QUANT: CPT | Performed by: STUDENT IN AN ORGANIZED HEALTH CARE EDUCATION/TRAINING PROGRAM

## 2024-05-12 PROCEDURE — 99232 SBSQ HOSP IP/OBS MODERATE 35: CPT | Performed by: STUDENT IN AN ORGANIZED HEALTH CARE EDUCATION/TRAINING PROGRAM

## 2024-05-12 PROCEDURE — 80048 BASIC METABOLIC PNL TOTAL CA: CPT | Performed by: NURSE PRACTITIONER

## 2024-05-12 PROCEDURE — 93005 ELECTROCARDIOGRAM TRACING: CPT

## 2024-05-12 PROCEDURE — 36415 COLL VENOUS BLD VENIPUNCTURE: CPT | Performed by: STUDENT IN AN ORGANIZED HEALTH CARE EDUCATION/TRAINING PROGRAM

## 2024-05-12 PROCEDURE — 2500000004 HC RX 250 GENERAL PHARMACY W/ HCPCS (ALT 636 FOR OP/ED): Performed by: STUDENT IN AN ORGANIZED HEALTH CARE EDUCATION/TRAINING PROGRAM

## 2024-05-12 PROCEDURE — 82947 ASSAY GLUCOSE BLOOD QUANT: CPT

## 2024-05-12 PROCEDURE — 85027 COMPLETE CBC AUTOMATED: CPT | Performed by: NURSE PRACTITIONER

## 2024-05-12 PROCEDURE — 1200000002 HC GENERAL ROOM WITH TELEMETRY DAILY

## 2024-05-12 PROCEDURE — 94640 AIRWAY INHALATION TREATMENT: CPT

## 2024-05-12 PROCEDURE — 97161 PT EVAL LOW COMPLEX 20 MIN: CPT | Mod: GP

## 2024-05-12 PROCEDURE — 83735 ASSAY OF MAGNESIUM: CPT | Performed by: NURSE PRACTITIONER

## 2024-05-12 PROCEDURE — 93010 ELECTROCARDIOGRAM REPORT: CPT | Performed by: INTERNAL MEDICINE

## 2024-05-12 RX ORDER — ACETAMINOPHEN 160 MG/5ML
650 SOLUTION ORAL EVERY 4 HOURS PRN
Status: DISCONTINUED | OUTPATIENT
Start: 2024-05-12 | End: 2024-05-17 | Stop reason: HOSPADM

## 2024-05-12 RX ORDER — OXYCODONE HYDROCHLORIDE 5 MG/1
5 TABLET ORAL EVERY 6 HOURS PRN
Status: DISCONTINUED | OUTPATIENT
Start: 2024-05-12 | End: 2024-05-17 | Stop reason: HOSPADM

## 2024-05-12 RX ORDER — HYDROCODONE BITARTRATE AND HOMATROPINE METHYLBROMIDE ORAL SOLUTION 5; 1.5 MG/5ML; MG/5ML
5 LIQUID ORAL 2 TIMES DAILY PRN
Status: DISCONTINUED | OUTPATIENT
Start: 2024-05-12 | End: 2024-05-17 | Stop reason: HOSPADM

## 2024-05-12 RX ORDER — ACETAMINOPHEN 325 MG/1
650 TABLET ORAL EVERY 4 HOURS PRN
Status: DISCONTINUED | OUTPATIENT
Start: 2024-05-12 | End: 2024-05-17 | Stop reason: HOSPADM

## 2024-05-12 RX ORDER — NITROGLYCERIN 0.4 MG/1
0.4 TABLET SUBLINGUAL EVERY 5 MIN PRN
Status: DISCONTINUED | OUTPATIENT
Start: 2024-05-12 | End: 2024-05-17 | Stop reason: HOSPADM

## 2024-05-12 RX ORDER — MORPHINE SULFATE 2 MG/ML
1 INJECTION, SOLUTION INTRAMUSCULAR; INTRAVENOUS ONCE
Status: COMPLETED | OUTPATIENT
Start: 2024-05-12 | End: 2024-05-12

## 2024-05-12 RX ORDER — ALPRAZOLAM 0.25 MG/1
0.25 TABLET ORAL NIGHTLY PRN
Status: DISCONTINUED | OUTPATIENT
Start: 2024-05-12 | End: 2024-05-17 | Stop reason: HOSPADM

## 2024-05-12 RX ADMIN — CEFTRIAXONE SODIUM 2 G: 2 INJECTION, POWDER, FOR SOLUTION INTRAMUSCULAR; INTRAVENOUS at 13:10

## 2024-05-12 RX ADMIN — ACETAMINOPHEN 650 MG: 325 TABLET ORAL at 19:27

## 2024-05-12 RX ADMIN — IPRATROPIUM BROMIDE AND ALBUTEROL SULFATE 3 ML: 2.5; .5 SOLUTION RESPIRATORY (INHALATION) at 13:26

## 2024-05-12 RX ADMIN — PANTOPRAZOLE SODIUM 40 MG: 40 TABLET, DELAYED RELEASE ORAL at 06:47

## 2024-05-12 RX ADMIN — ENOXAPARIN SODIUM 30 MG: 30 INJECTION SUBCUTANEOUS at 16:45

## 2024-05-12 RX ADMIN — IPRATROPIUM BROMIDE AND ALBUTEROL SULFATE 3 ML: 2.5; .5 SOLUTION RESPIRATORY (INHALATION) at 07:12

## 2024-05-12 RX ADMIN — VENLAFAXINE HYDROCHLORIDE 150 MG: 150 CAPSULE, EXTENDED RELEASE ORAL at 07:52

## 2024-05-12 RX ADMIN — Medication 1 LOZENGE: at 00:34

## 2024-05-12 RX ADMIN — HYDROCODONE BITARTRATE AND HOMATROPINE METHYLBROMIDE 5 ML: 5; 1.5 SOLUTION ORAL at 19:27

## 2024-05-12 RX ADMIN — MORPHINE SULFATE 1 MG: 2 INJECTION, SOLUTION INTRAMUSCULAR; INTRAVENOUS at 20:18

## 2024-05-12 RX ADMIN — NITROGLYCERIN 0.4 MG: 0.4 TABLET SUBLINGUAL at 19:27

## 2024-05-12 RX ADMIN — ALPRAZOLAM 0.25 MG: 0.25 TABLET ORAL at 01:01

## 2024-05-12 RX ADMIN — Medication 1 LOZENGE: at 07:52

## 2024-05-12 RX ADMIN — Medication 1 LOZENGE: at 16:45

## 2024-05-12 ASSESSMENT — COGNITIVE AND FUNCTIONAL STATUS - GENERAL
WALKING IN HOSPITAL ROOM: A LOT
MOVING FROM LYING ON BACK TO SITTING ON SIDE OF FLAT BED WITH BEDRAILS: A LITTLE
DAILY ACTIVITIY SCORE: 16
MOBILITY SCORE: 12
MOBILITY SCORE: 16
CLIMB 3 TO 5 STEPS WITH RAILING: A LOT
DRESSING REGULAR UPPER BODY CLOTHING: A LITTLE
TURNING FROM BACK TO SIDE WHILE IN FLAT BAD: A LOT
TURNING FROM BACK TO SIDE WHILE IN FLAT BAD: A LOT
PERSONAL GROOMING: A LITTLE
HELP NEEDED FOR BATHING: A LOT
MOVING TO AND FROM BED TO CHAIR: A LOT
DRESSING REGULAR LOWER BODY CLOTHING: A LOT
CLIMB 3 TO 5 STEPS WITH RAILING: A LOT
STANDING UP FROM CHAIR USING ARMS: A LOT
MOVING TO AND FROM BED TO CHAIR: A LITTLE
STANDING UP FROM CHAIR USING ARMS: A LITTLE
TOILETING: A LOT
MOVING FROM LYING ON BACK TO SITTING ON SIDE OF FLAT BED WITH BEDRAILS: A LOT
WALKING IN HOSPITAL ROOM: A LITTLE

## 2024-05-12 ASSESSMENT — PAIN SCALES - GENERAL
PAINLEVEL_OUTOF10: 0 - NO PAIN
PAINLEVEL_OUTOF10: 0 - NO PAIN
PAINLEVEL_OUTOF10: 5 - MODERATE PAIN

## 2024-05-12 ASSESSMENT — PAIN - FUNCTIONAL ASSESSMENT
PAIN_FUNCTIONAL_ASSESSMENT: 0-10

## 2024-05-12 ASSESSMENT — ACTIVITIES OF DAILY LIVING (ADL)
BATHING_ASSISTANCE: MODERATE
HOME_MANAGEMENT_TIME_ENTRY: 12

## 2024-05-12 NOTE — PROGRESS NOTES
Physical Therapy    Physical Therapy Evaluation    Patient Name: Merna Jaffe  MRN: [id number]  Today's Date: 5/12/2024   Time Calculation  Start Time: 0854  Stop Time: 0915  Time Calculation (min): 21 min    Assessment/Plan   PT Assessment  PT Assessment Results: Decreased strength, Decreased endurance, Impaired balance, Decreased mobility, Decreased safety awareness  Rehab Prognosis: Good  End of Session Communication: Bedside nurse  Assessment Comment: Pt presents with decreased functional mobility secondary to weakness, decreased balance, and decreased safety awareness. Pt is at high risk for falling.  End of Session Patient Position: Up in chair, Alarm on  IP OR SWING BED PT PLAN  Inpatient or Swing Bed: Inpatient  PT Plan  Treatment/Interventions: Bed mobility, Transfer training, Gait training, Stair training, Balance training, Neuromuscular re-education, Strengthening, Endurance training, Range of motion, Therapeutic exercise, Therapeutic activity, Home exercise program  PT Plan: Skilled PT  PT Frequency: 4 times per week  PT Discharge Recommendations: Moderate intensity level of continued care  PT Recommended Transfer Status: Assist x1, Assistive device  PT - OK to Discharge: Yes (PT eval complete, ok to d/c once deemed medically appropriate.)    Subjective     Current Problem:  Patient Active Problem List   Diagnosis    Pneumonia of both lungs due to infectious organism, unspecified part of lung       General Visit Information:  General  Reason for Referral: impaired mobility  Referred By: PT/OT 5/11 Antionette  Past Medical History Relevant to Rehab: HTN, depression, anxiety  Family/Caregiver Present: No  Co-Treatment: OT  Prior to Session Communication: Bedside nurse  Patient Position Received: Bed, 3 rail up, Alarm on (purewick external catheter, telemetry)  General Comment: Pt is an 87 y/o F who presented to ED on 5/10 with c/o SOB for the past week and diarrhea. SpO2 in 70's on admission. XR chest:  pulmonary edema, CT chest: bronchitis and acute BRIDGER pneumonia.    Home Living:  Home Living  Home Living Comments: Pt reports living alone in 2 level home with 4 LAUREEN with HR. Pt's bedroom/bathroom are located on first floor and laundry in basement. Pt's  passed away in February 2024. Pt has a walk in shower with a seat and grab bars. Pt owns a hospital bed, FWW, cane, w/c.    Prior Level of Function:  Prior Function Per Pt/Caregiver Report  Prior Function Comments: Pt reports ambulating IND without a device and completing all ADLs/iADLs IND. Pt's son lives nearby and bring pt food. Pt reports completing laundry in basement by scooting down the steps. Pt reports multiple falls in the past 3 months and was driving.    Precautions:  Precautions  Medical Precautions: Fall precautions    Vital Signs:     Objective     Pain:  Pain Assessment  Pain Assessment: 0-10  Pain Score: 0 - No pain    Cognition:  Cognition  Overall Cognitive Status: Within Functional Limits  Orientation Level: Oriented X4    General Assessments:      Activity Tolerance  Endurance: Decreased tolerance for upright activites     Strength  Strength Comments: BLE 3+/5 overall        Postural Control  Posture Comment: Slightly retropulsive in standing  Static Sitting Balance  Static Sitting-Comment/Number of Minutes: Good  Dynamic Sitting Balance  Dynamic Sitting-Comments: Fair +  Static Standing Balance  Static Standing-Comment/Number of Minutes: Fair  Dynamic Standing Balance  Dynamic Standing-Comments: Fair -    Functional Assessments:     Bed Mobility  Bed Mobility: Yes  Bed Mobility 1  Bed Mobility 1: Supine to sitting  Level of Assistance 1: Moderate assistance  Bed Mobility Comments 1: HOB elevated, use of bed rails, assist for trunk control and to scoot to EOB  Transfers  Transfer: Yes  Transfer 1  Transfer From 1: Bed to  Transfer to 1: Chair with arms  Technique 1: Sit to stand, Stand to sit  Transfer Device 1:  (FWW)  Transfer Level of  Assistance 1: Moderate assistance  Trials/Comments 1: Verbal and tactile cues for hand placement with ww, assist for lift and for balance upon stand - pt retropulsive initially  Ambulation/Gait Training  Ambulation/Gait Training Performed: Yes  Ambulation/Gait Training 1  Surface 1: Level tile  Device 1: Rolling walker  Assistance 1: Moderate assistance  Quality of Gait 1: Narrow base of support, Decreased step length, Diminished heel strike, Shuffling gait  Comments/Distance (ft) 1: Pt ambulates ~5' bed > recliner with mod A for balance and ww management.          Extremity/Trunk Assessments:        RLE   RLE : Within Functional Limits  LLE   LLE : Within Functional Limits    Outcome Measures:  [facility] Basic Mobility  Turning from your back to your side while in a flat bed without using bedrails: A lot  Moving from lying on your back to sitting on the side of a flat bed without using bedrails: A lot  Moving to and from bed to chair (including a wheelchair): A lot  Standing up from a chair using your arms (e.g. wheelchair or bedside chair): A lot  To walk in hospital room: A lot  Climbing 3-5 steps with railing: A lot  Basic Mobility - Total Score: 12                            Goals:  Encounter Problems       Encounter Problems (Active)       PT Problem       Pt will demonstrate sup > sit and sit > sup bed mobility mod I (Progressing)       Start:  05/12/24    Expected End:  05/26/24            Pt will demo sit > stand and stand > sit transfer with ww and mod I  (Progressing)       Start:  05/12/24    Expected End:  05/26/24            Pt will ambulate 25' with ww and mod I, without LOB  (Progressing)       Start:  05/12/24    Expected End:  05/26/24            Pt will demonstrate ability to tolerate 8 minutes of seated or standing therapeutic exercise with 4 or less rest breaks to demonstrate improved activity tolerance.  (Progressing)       Start:  05/12/24    Expected End:  05/26/24                 Education  Documentation  Mobility Training, taught by Alisha Lima PT at 5/12/2024 10:24 AM.  Learner: Patient  Readiness: Acceptance  Method: Explanation  Response: Needs Reinforcement    Education Comments  No comments found.

## 2024-05-12 NOTE — CARE PLAN
The patient's goals for the shift include      The clinical goals for the shift include maintain SP02 greater than 92% for the remainder of the shift.    Over the shift, the patient did not make progress toward the following goals. Barriers to progression include. Recommendations to address these barriers include .

## 2024-05-12 NOTE — PROGRESS NOTES
Merna Jaffe is a 88 y.o. female on day 2 of admission presenting with Pneumonia of both lungs due to infectious organism, unspecified part of lung.    Subjective   Patient seen and examined.  She is currently on room air, appears stable, she was slightly emotional started talking about her  and started crying nausea or vomiting.   .  Denies any chest pains, fevers, chills,    Objective     Physical Exam  Constitutional:       Appearance: She is ill-appearing.   HENT:      Head: Normocephalic and atraumatic.   Eyes:      Extraocular Movements: Extraocular movements intact.   Cardiovascular:      Rate and Rhythm: Normal rate and regular rhythm.   Pulmonary:      Breath sounds: Rales present.      Comments: Respiratory effort seems better,  Abdominal:      Palpations: Abdomen is soft.   Neurological:      Mental Status: She is alert. Mental status is at baseline.         Last Recorded Vitals  Blood pressure 109/68, pulse 100, temperature 36.1 °C (97 °F), temperature source Temporal, resp. rate 18, height 1.524 m (5'), weight 54.4 kg (119 lb 14.9 oz), SpO2 94%.  Intake/Output last 3 Shifts:  I/O last 3 completed shifts:  In: 577 (10.6 mL/kg) [P.O.:120; I.V.:258 (4.7 mL/kg); IV Piggyback:199]  Out: 1950 (35.8 mL/kg) [Urine:1950 (1 mL/kg/hr)]  Weight: 54.4 kg     Relevant Results                             Assessment/Plan   Principal Problem:    Pneumonia of both lungs due to infectious organism, unspecified part of lung    88-year-old female was transferred out of the ICU, she was initially admitted for anxiety/depression, acute hypoxemic respiratory failure, multifocal pneumonia, acute pulmonary edema, SANJANA    Patient was transferred out of the ICU yesterday  Currently on room air during the  On ceftriaxone for pneumonia  Currently does not seem to be on oxygen  Echocardiogram reviewed  Did have a component of pulmonary edema making breathing worse  Right now she seems to be in fluid balance  DuoNebs, O2  support as needed will involve  for discharge planning she does need help  Continue home meds for anxiety/depression  Creatinine went up slightly with diuresis, of diuretics, continue to monitor creatinine   continue insulin sliding scale  Strep pneumo, Legionella, MRSA all came back negative, blood cultures have been negative as well  She continues to have persistent white cell count, does not seem like she is on steroids, procalcitonin was also elevated  If it persistently goes up will need further workup along with changing antibiotics  DVT prophylaxis           Tank Lazo MD

## 2024-05-12 NOTE — PROGRESS NOTES
Occupational Therapy    Evaluation    Patient Name: Merna Jaffe  MRN: [id number]  Today's Date: 5/12/2024  Time Calculation  Start Time: 0853  Stop Time: 0915  Time Calculation (min): 22 min        Assessment:  OT Assessment: Decreased strength, endurance, balance  Prognosis: Fair  Barriers to Discharge:  (lives alone)  Medical Staff Made Aware: Yes  End of Session Communication: Bedside nurse  End of Session Patient Position: Up in chair, Alarm on  OT Assessment Results: Decreased ADL status, Decreased upper extremity range of motion, Decreased upper extremity strength, Decreased endurance, Decreased functional mobility, Decreased IADLs  Prognosis: Fair  Barriers to Discharge:  (lives alone)  Medical Staff Made Aware: Yes  Plan:  Treatment Interventions: ADL retraining, Functional transfer training, UE strengthening/ROM, Endurance training, Patient/family training  OT Frequency: 2 times per week  OT Discharge Recommendations: Moderate intensity level of continued care  Equipment Recommended upon Discharge:  (Pt owns FWW, cane, w/c, hospital bed)  OT - OK to Discharge: Yes (ok to d/c to next level of care once medically cleared)  Treatment Interventions: ADL retraining, Functional transfer training, UE strengthening/ROM, Endurance training, Patient/family training    Subjective   Current Problem:  1. Pneumonia of both lungs due to infectious organism, unspecified part of lung        2. Acute respiratory failure, unspecified whether with hypoxia or hypercapnia (Multi)  Transthoracic Echo (TTE) Complete    Transthoracic Echo (TTE) Complete      3. Sepsis, due to unspecified organism, unspecified whether acute organ dysfunction present (Multi)        4. SANJANA (acute kidney injury) (CMS-[facility])        5. Pulmonary edema cardiac cause (Multi)  Transthoracic Echo (TTE) Complete    Transthoracic Echo (TTE) Complete      6. Acute respiratory failure with hypoxia (Multi)  Transthoracic Echo (TTE) Complete         General:  General  Reason for Referral: self care impairment  Referred By: PT/OT 5/11 Antionette  Past Medical History Relevant to Rehab: HTN, depression, anxiety  Family/Caregiver Present: No  Prior to Session Communication: Bedside nurse  Patient Position Received: Bed, 3 rail up, Alarm on ([place], tele)  General Comment: Pt is an 87 y/o F who presented to ED on 5/10 with c/o SOB for the past week and diarrhea. SpO2 in 70's on admission. XR chest: pulmonary edema, CT chest: bronchitis and acute BRIDGER pneumonia.  Precautions:  Medical Precautions: Fall precautions     Pain:  Pain Assessment  Pain Assessment: 0-10  Pain Score: 0 - No pain    Objective   Cognition:  Overall Cognitive Status: Within Functional Limits           Home Living:  Home Living Comments: Pt reports living alone in 2 level home with 4 LAUREEN with HR. Pt's bedroom/bathroom are located on first floor and laundry in basement. Pt's  passed away in February 2024. Pt has a walk in shower with a seat and grab bars. Pt owns a hospital bed, FWW, cane, w/c.  Prior Function:  Prior Function Comments: Pt reports ambulating IND without a device and completing all ADLs/iADLs IND. Pt's son lives nearby and bring pt food. Pt reports completing laundry by scooting down the steps. Pt reports multiple falls in the past 3 months and was driving.     ADL:  Eating Assistance: Independent  Grooming Assistance: Minimal  Bathing Assistance: Moderate  UE Dressing Assistance: Minimal  LE Dressing Assistance: Moderate  Toileting Assistance with Device: Moderate  Activity Tolerance:  Endurance: Decreased tolerance for upright activites  Bed Mobility/Transfers: Bed Mobility  Bed Mobility: Yes (Transition from supine to sit EOB MOD A and HOB elevated, use of bed rails.)    Transfers  Transfer: Yes (Sit to stand and stand to sit using FWW with MOD A, cues for hand placement and technique.)      Functional Mobility:  Functional Mobility  Functional Mobility Performed:  Yes (Pt ambulated short functional distance from bed to BSC using FWW with MOD A and increased time.)  Sitting Balance:  Static Sitting Balance  Static Sitting-Comment/Number of Minutes: fair  Dynamic Sitting Balance  Dynamic Sitting-Comments: fair -  Standing Balance:  Static Standing Balance  Static Standing-Comment/Number of Minutes: poor +  Dynamic Standing Balance  Dynamic Standing-Comments: poor +   Strength:  Strength Comments: R shoulder 2+/5, L shoulder 3/5 BRIDGER elbows/wrists/hands 3/5      Hand Function:  Gross Grasp: Functional  Extremities: RUE   RUE : Exceptions to WFL (R shoulder ~40*, elbow/wrist/hand WFL) and LUE   LUE: Within Functional Limits    Outcome Measures:[facility] Daily Activity  Putting on and taking off regular lower body clothing: A lot  Bathing (including washing, rinsing, drying): A lot  Putting on and taking off regular upper body clothing: A little  Toileting, which includes using toilet, bedpan or urinal: A lot  Taking care of personal grooming such as brushing teeth: A little  Eating Meals: None  Daily Activity - Total Score: 16    Education Documentation  Body Mechanics, taught by Viviane Crandall OT at 5/12/2024 10:10 AM.  Learner: Patient  Readiness: Acceptance  Method: Explanation, Demonstration  Response: Verbalizes Understanding, Needs Reinforcement    ADL Training, taught by Viviane Crandall OT at 5/12/2024 10:10 AM.  Learner: Patient  Readiness: Acceptance  Method: Explanation, Demonstration  Response: Verbalizes Understanding, Needs Reinforcement    IP EDUCATION:  Education  Individual(s) Educated: Patient  Education Comment: Pt educated on safe functional mobility/transfer techniques. Verbalized good understanding and requires reinforcement.    Goals:  Encounter Problems       Encounter Problems (Active)       OT Goals       Pt will complete LB dressing with MOD I. (Progressing)       Start:  05/12/24    Expected End:  05/26/24            Pt will complete all functional  transfers with MOD I. (Progressing)       Start:  05/12/24    Expected End:  05/26/24            Pt will complete all functional mobility with MOD I. (Progressing)       Start:  05/12/24    Expected End:  05/26/24            Pt will tolerate 10 mins of functional activities with 2 or fewer rest breaks. (Progressing)       Start:  05/12/24    Expected End:  05/26/24

## 2024-05-13 LAB
ANION GAP SERPL CALC-SCNC: 15 MMOL/L (ref 10–20)
ATRIAL RATE: 94 BPM
BNP SERPL-MCNC: 71 PG/ML (ref 0–99)
BUN SERPL-MCNC: 79 MG/DL (ref 6–23)
CALCIUM SERPL-MCNC: 9.2 MG/DL (ref 8.6–10.3)
CHLORIDE SERPL-SCNC: 110 MMOL/L (ref 98–107)
CO2 SERPL-SCNC: 18 MMOL/L (ref 21–32)
CREAT SERPL-MCNC: 2.47 MG/DL (ref 0.5–1.05)
EGFRCR SERPLBLD CKD-EPI 2021: 18 ML/MIN/1.73M*2
ERYTHROCYTE [DISTWIDTH] IN BLOOD BY AUTOMATED COUNT: 14.4 % (ref 11.5–14.5)
GLUCOSE BLD MANUAL STRIP-MCNC: 102 MG/DL (ref 74–99)
GLUCOSE BLD MANUAL STRIP-MCNC: 119 MG/DL (ref 74–99)
GLUCOSE BLD MANUAL STRIP-MCNC: 78 MG/DL (ref 74–99)
GLUCOSE BLD MANUAL STRIP-MCNC: 79 MG/DL (ref 74–99)
GLUCOSE SERPL-MCNC: 88 MG/DL (ref 74–99)
HCT VFR BLD AUTO: 28.7 % (ref 36–46)
HGB BLD-MCNC: 9.7 G/DL (ref 12–16)
HOLD SPECIMEN: NORMAL
MAGNESIUM SERPL-MCNC: 2.11 MG/DL (ref 1.6–2.4)
MCH RBC QN AUTO: 32.7 PG (ref 26–34)
MCHC RBC AUTO-ENTMCNC: 33.8 G/DL (ref 32–36)
MCV RBC AUTO: 97 FL (ref 80–100)
NRBC BLD-RTO: 0.3 /100 WBCS (ref 0–0)
P AXIS: 53 DEGREES
P OFFSET: 218 MS
P ONSET: 169 MS
PLATELET # BLD AUTO: 331 X10*3/UL (ref 150–450)
POTASSIUM SERPL-SCNC: 3.5 MMOL/L (ref 3.5–5.3)
PR INTERVAL: 114 MS
Q ONSET: 226 MS
QRS COUNT: 16 BEATS
QRS DURATION: 86 MS
QT INTERVAL: 368 MS
QTC CALCULATION(BAZETT): 460 MS
QTC FREDERICIA: 427 MS
R AXIS: 35 DEGREES
RBC # BLD AUTO: 2.97 X10*6/UL (ref 4–5.2)
SODIUM SERPL-SCNC: 139 MMOL/L (ref 136–145)
T AXIS: 57 DEGREES
T OFFSET: 410 MS
VENTRICULAR RATE: 94 BPM
WBC # BLD AUTO: 15.9 X10*3/UL (ref 4.4–11.3)

## 2024-05-13 PROCEDURE — 82374 ASSAY BLOOD CARBON DIOXIDE: CPT | Performed by: NURSE PRACTITIONER

## 2024-05-13 PROCEDURE — 1210000001 HC SEMI-PRIVATE ROOM DAILY

## 2024-05-13 PROCEDURE — 97530 THERAPEUTIC ACTIVITIES: CPT | Mod: GO

## 2024-05-13 PROCEDURE — 36415 COLL VENOUS BLD VENIPUNCTURE: CPT | Performed by: NURSE PRACTITIONER

## 2024-05-13 PROCEDURE — 2500000004 HC RX 250 GENERAL PHARMACY W/ HCPCS (ALT 636 FOR OP/ED): Performed by: NURSE PRACTITIONER

## 2024-05-13 PROCEDURE — 2500000001 HC RX 250 WO HCPCS SELF ADMINISTERED DRUGS (ALT 637 FOR MEDICARE OP): Performed by: NURSE PRACTITIONER

## 2024-05-13 PROCEDURE — 2500000002 HC RX 250 W HCPCS SELF ADMINISTERED DRUGS (ALT 637 FOR MEDICARE OP, ALT 636 FOR OP/ED): Performed by: STUDENT IN AN ORGANIZED HEALTH CARE EDUCATION/TRAINING PROGRAM

## 2024-05-13 PROCEDURE — 83880 ASSAY OF NATRIURETIC PEPTIDE: CPT | Performed by: NURSE PRACTITIONER

## 2024-05-13 PROCEDURE — 83735 ASSAY OF MAGNESIUM: CPT | Performed by: NURSE PRACTITIONER

## 2024-05-13 PROCEDURE — 94640 AIRWAY INHALATION TREATMENT: CPT

## 2024-05-13 PROCEDURE — 99232 SBSQ HOSP IP/OBS MODERATE 35: CPT | Performed by: INTERNAL MEDICINE

## 2024-05-13 PROCEDURE — 2500000001 HC RX 250 WO HCPCS SELF ADMINISTERED DRUGS (ALT 637 FOR MEDICARE OP): Performed by: STUDENT IN AN ORGANIZED HEALTH CARE EDUCATION/TRAINING PROGRAM

## 2024-05-13 PROCEDURE — 85027 COMPLETE CBC AUTOMATED: CPT | Performed by: NURSE PRACTITIONER

## 2024-05-13 PROCEDURE — 97530 THERAPEUTIC ACTIVITIES: CPT | Mod: GP,CQ

## 2024-05-13 PROCEDURE — 82947 ASSAY GLUCOSE BLOOD QUANT: CPT | Mod: 91

## 2024-05-13 PROCEDURE — 97535 SELF CARE MNGMENT TRAINING: CPT | Mod: GO

## 2024-05-13 RX ORDER — IPRATROPIUM BROMIDE AND ALBUTEROL SULFATE 2.5; .5 MG/3ML; MG/3ML
3 SOLUTION RESPIRATORY (INHALATION) EVERY 4 HOURS PRN
Status: DISCONTINUED | OUTPATIENT
Start: 2024-05-13 | End: 2024-05-17 | Stop reason: HOSPADM

## 2024-05-13 RX ADMIN — ENOXAPARIN SODIUM 30 MG: 30 INJECTION SUBCUTANEOUS at 16:26

## 2024-05-13 RX ADMIN — VENLAFAXINE HYDROCHLORIDE 150 MG: 150 CAPSULE, EXTENDED RELEASE ORAL at 08:36

## 2024-05-13 RX ADMIN — PANTOPRAZOLE SODIUM 40 MG: 40 TABLET, DELAYED RELEASE ORAL at 06:22

## 2024-05-13 RX ADMIN — IPRATROPIUM BROMIDE AND ALBUTEROL SULFATE 3 ML: 2.5; .5 SOLUTION RESPIRATORY (INHALATION) at 20:00

## 2024-05-13 RX ADMIN — CEFTRIAXONE SODIUM 2 G: 2 INJECTION, POWDER, FOR SOLUTION INTRAMUSCULAR; INTRAVENOUS at 12:10

## 2024-05-13 RX ADMIN — IPRATROPIUM BROMIDE AND ALBUTEROL SULFATE 3 ML: 2.5; .5 SOLUTION RESPIRATORY (INHALATION) at 07:13

## 2024-05-13 RX ADMIN — OXYCODONE HYDROCHLORIDE 5 MG: 5 TABLET ORAL at 21:09

## 2024-05-13 RX ADMIN — IPRATROPIUM BROMIDE AND ALBUTEROL SULFATE 3 ML: 2.5; .5 SOLUTION RESPIRATORY (INHALATION) at 13:34

## 2024-05-13 RX ADMIN — HYDROCODONE BITARTRATE AND HOMATROPINE METHYLBROMIDE 5 ML: 5; 1.5 SOLUTION ORAL at 06:23

## 2024-05-13 ASSESSMENT — COGNITIVE AND FUNCTIONAL STATUS - GENERAL
MOVING FROM LYING ON BACK TO SITTING ON SIDE OF FLAT BED WITH BEDRAILS: A LITTLE
MOVING TO AND FROM BED TO CHAIR: A LITTLE
TURNING FROM BACK TO SIDE WHILE IN FLAT BAD: A LITTLE
DRESSING REGULAR UPPER BODY CLOTHING: A LITTLE
CLIMB 3 TO 5 STEPS WITH RAILING: TOTAL
STANDING UP FROM CHAIR USING ARMS: A LITTLE
CLIMB 3 TO 5 STEPS WITH RAILING: A LOT
MOVING FROM LYING ON BACK TO SITTING ON SIDE OF FLAT BED WITH BEDRAILS: A LITTLE
MOBILITY SCORE: 15
DRESSING REGULAR UPPER BODY CLOTHING: A LITTLE
CLIMB 3 TO 5 STEPS WITH RAILING: TOTAL
MOBILITY SCORE: 15
WALKING IN HOSPITAL ROOM: A LOT
DAILY ACTIVITIY SCORE: 14
STANDING UP FROM CHAIR USING ARMS: A LITTLE
DAILY ACTIVITIY SCORE: 14
STANDING UP FROM CHAIR USING ARMS: A LITTLE
TOILETING: A LOT
DRESSING REGULAR LOWER BODY CLOTHING: A LOT
HELP NEEDED FOR BATHING: A LOT
PERSONAL GROOMING: A LOT
WALKING IN HOSPITAL ROOM: A LITTLE
TOILETING: A LOT
TURNING FROM BACK TO SIDE WHILE IN FLAT BAD: A LOT
EATING MEALS: A LITTLE
MOVING TO AND FROM BED TO CHAIR: A LITTLE
HELP NEEDED FOR BATHING: A LOT
HELP NEEDED FOR BATHING: A LOT
MOVING TO AND FROM BED TO CHAIR: A LITTLE
MOBILITY SCORE: 16
DAILY ACTIVITIY SCORE: 16
PERSONAL GROOMING: A LOT
PERSONAL GROOMING: A LITTLE
EATING MEALS: A LITTLE
TURNING FROM BACK TO SIDE WHILE IN FLAT BAD: A LITTLE
TOILETING: A LOT
MOVING FROM LYING ON BACK TO SITTING ON SIDE OF FLAT BED WITH BEDRAILS: A LITTLE
WALKING IN HOSPITAL ROOM: A LOT
DRESSING REGULAR LOWER BODY CLOTHING: A LOT
DRESSING REGULAR UPPER BODY CLOTHING: A LITTLE
DRESSING REGULAR LOWER BODY CLOTHING: A LOT

## 2024-05-13 ASSESSMENT — PAIN - FUNCTIONAL ASSESSMENT
PAIN_FUNCTIONAL_ASSESSMENT: 0-10

## 2024-05-13 ASSESSMENT — ACTIVITIES OF DAILY LIVING (ADL): HOME_MANAGEMENT_TIME_ENTRY: 15

## 2024-05-13 ASSESSMENT — PAIN SCALES - GENERAL
PAINLEVEL_OUTOF10: 10 - WORST POSSIBLE PAIN
PAINLEVEL_OUTOF10: 0 - NO PAIN
PAINLEVEL_OUTOF10: 3
PAINLEVEL_OUTOF10: 0 - NO PAIN
PAINLEVEL_OUTOF10: 3

## 2024-05-13 ASSESSMENT — PAIN DESCRIPTION - ORIENTATION: ORIENTATION: RIGHT

## 2024-05-13 NOTE — CARE PLAN
The patient's goals for the shift include no pain    The clinical goals for the shift include labs/blood glucose wnl    Over the shift, the patient did not make progress toward the following goals: none. Barriers to progression include n/a. Recommendations to address these barriers include continue current plan of care.

## 2024-05-13 NOTE — CONSULTS
"Nutrition Initial Assessment:   Nutrition Assessment         Patient is a 88 y.o. female presenting with pneumonia of both lungs      Nutrition History:  Energy Intake: Poor < 50 %  Food and Nutrient History: RD consulted for MST = 2. Per pt, appetite has declined as part of aging process. Says used to eat large portions, though now \"eat like a bird\". Reports lactose intolerance, though only affected by milk. Able to tolerate yogurt and cottage cheese per pt. Says doesn't eat breakfast at home, though will drink strawberry Boost. Ordered strawberry Ensure once daily. Unable to tell RD what usually eats for lunch and dinner despite prompting.  Vitamin/Herbal Supplement Use: potassium chloride  Food Allergies/Intolerances:  None  GI Symptoms: None  Oral Problems:  requesting Easy to Chew diet d/t poor dentition       Anthropometrics:  Height: 152.4 cm (5')   Weight: 54.4 kg (119 lb 14.9 oz)   BMI (Calculated): 23.42             Weight History:     Wt Readings from Last 10 Encounters:   05/10/24 54.4 kg (119 lb 14.9 oz)     5/5/23: 56.7 kg  8/9/23: 56.7 kg  1/9/24: 57.2 kg  4/9/24: 56.2 kg  5/2/24: 55.5 kg    Weight Change %:  Significant Weight Loss: No    Nutrition Focused Physical Exam Findings:    Subcutaneous Fat Loss:   Orbital Fat Pads: Well nourished (slightly bulging fat pads)  Buccal Fat Pads: Well nourished (full, rounded cheeks)  Triceps: Well nourished (ample fat tissue)  Ribs: Defer  Muscle Wasting:  Temporalis: Severe (hollowed scooping depression)  Pectoralis (Clavicular Region): Mild-Moderate (some protrusion of clavicle) (possible d/t anatomy)  Deltoid/Trapezius: Well nourished (rounded appearance at arm, shoulder, neck)  Interosseous: Well nourished (muscle bulges)  Trapezius/Infraspinatus/Supraspinatus (Scapular Region): Defer  Quadriceps: Defer  Gastrocnemius: Defer  Edema:  Edema Location: non-pitting BLE per nursing assessment  Physical Findings:  Skin: Negative (for pressure " injuries)    Nutrition Significant Labs:  BMP Trend:   Results from last 7 days   Lab Units 05/13/24  0523 05/12/24  0617 05/11/24  0449 05/10/24  1117   GLUCOSE mg/dL 88 117* 169* 168*   CALCIUM mg/dL 9.2 9.3 9.5 9.6   SODIUM mmol/L 139 139 137 137   POTASSIUM mmol/L 3.5 3.5 3.3* 3.4*   CO2 mmol/L 18* 18* 18* 15*   CHLORIDE mmol/L 110* 108* 106 106   BUN mg/dL 79* 82* 62* 66*   CREATININE mg/dL 2.47* 2.51* 2.13* 2.10*        Nutrition Specific Medications:  Reviewed    I/O:    ;      Dietary Orders (From admission, onward)       Start     Ordered    05/13/24 1117  Oral nutritional supplements  Until discontinued        Comments: Strawberry only   Question Answer Comment   Deliver with Breakfast    Select supplement: Ensure Plus High Protein        05/13/24 1116    05/10/24 1552  Adult diet Regular  Diet effective now        Question:  Diet type  Answer:  Regular    05/10/24 1551                     Estimated Needs:   Total Energy Estimated Needs (kCal): 1500 kCal  Method for Estimating Needs: 5034-5881 kcal (25-30 kcal/kg)  Total Protein Estimated Needs (g): 50 g  Method for Estimating Needs: 44-54g (0.8-1 g/kg)  Total Fluid Estimated Needs (mL): 1500 mL  Method for Estimating Needs: 1 ml/kcal or per MD        Nutrition Diagnosis   Malnutrition Diagnosis  Patient has Malnutrition Diagnosis: No (insufficient data to diagnose at this time)    Nutrition Diagnosis  Patient has Nutrition Diagnosis: Yes  Diagnosis Status (1): New  Nutrition Diagnosis 1: Inadequate oral intake  Related to (1): decreased appetite with aging process  As Evidenced by (1): consumed <50% of breakfast per tray at bedside       Nutrition Interventions/Recommendations         Nutrition Prescription:  Individualized Nutrition Prescription Provided for : Regular diet. Easy to Chew. Oral nutritional supplements. Food preferences sent to kitchen.        Nutrition Interventions:   Interventions: Meals and snacks, Medical food supplement  Goal:  Consumes 3 meals per day  Medical Food Supplement: Commercial beverage  Goal: Ensure Plus High Protein daily (provides 350 kcal, 20 g protein per serving)      Nutrition Education:      Denied need for diet education.    Nutrition Monitoring and Evaluation   Food/Nutrient Related History Monitoring  Monitoring and Evaluation Plan: Energy intake, Amount of food, Fluid intake  Energy Intake: Estimated energy intake  Criteria: Pt meets >75% of estimated energy needs  Fluid Intake: Estimated fluid intake  Criteria: Meets >75% of estimated fluid needs  Amount of Food: Estimated amout of food, Medical food intake  Criteria: Pt consumes >50% of meals and supplements    Body Composition/Growth/Weight History  Monitoring and Evaluation Plan: Weight  Weight: Measured weight  Criteria: Maintains stable weight    Biochemical Data, Medical Tests and Procedures  Monitoring and Evaluation Plan: Glucose/endocrine profile, Electrolyte/renal panel  Electrolyte and Renal Panel: Sodium, Potassium, Phosphorus  Criteria: Electrolytes WNL  Glucose/Endocrine Profile: Glucose, casual  Criteria: BG within desirable range            Time Spent/Follow-up Reminder:   Time Spent (min): 45 minutes  Last Date of Nutrition Visit: 05/13/24  Nutrition Follow-Up Needed?: 3-5 days

## 2024-05-13 NOTE — PROGRESS NOTES
Merna Jaffe is a 88 y.o. female on day 3 of admission presenting with Pneumonia of both lungs due to infectious organism, unspecified part of lung.    Subjective   NAEON. She is feeling better compared to yesterday. Still not quite back to baseline. Some mild dyspnea now. No CP.     Objective     Physical Exam  Constitutional:       Appearance: She is not ill-appearing.   HENT:      Head: Normocephalic and atraumatic.   Eyes:      Extraocular Movements: Extraocular movements intact.   Cardiovascular:      Rate and Rhythm: Normal rate and regular rhythm.   Pulmonary:      Breath sounds: No rales.      Comments: Respiratory effort seems better,  Abdominal:      Palpations: Abdomen is soft.   Neurological:      Mental Status: She is alert. Mental status is at baseline.         Last Recorded Vitals  Blood pressure 133/83, pulse 91, temperature 36.5 °C (97.7 °F), resp. rate 18, height 1.524 m (5'), weight 54.4 kg (119 lb 14.9 oz), SpO2 91%.  Intake/Output last 3 Shifts:  No intake/output data recorded.    Relevant Results                Assessment/Plan   Principal Problem:    Pneumonia of both lungs due to infectious organism, unspecified part of lung    88-year-old female was transferred out of the ICU, she was initially admitted for anxiety/depression, acute hypoxemic respiratory failure, multifocal pneumonia, acute pulmonary edema, SANJANA    Patient was transferred out of the ICU yesterday  Currently on room air during the  On ceftriaxone for pneumonia  Currently does not seem to be on oxygen  Echocardiogram reviewed  Did have a component of pulmonary edema making breathing worse  Right now she seems to be in fluid balance  DuoNebs, O2 support as needed will involve  for discharge planning she does need help  Continue home meds for anxiety/depression  Creatinine went up slightly with diuresis, of diuretics, continue to monitor creatinine   continue insulin sliding scale  Strep pneumo, Legionella, MRSA  all came back negative, blood cultures have been negative as well  She continues to have persistent white cell count, does not seem like she is on steroids, procalcitonin was also elevated  If it persistently goes up will need further workup along with changing antibiotics  DVT prophylaxis    5/13  -multifocal pna, appears to be improving, wbc downtrending 15.9k today, no fevers, hds, remains on room air  -cont empiric rocephin, duonebs  -johnathan on ckd3, scr stable today, cont to hold diuretics, she appears euvolemic, avoid nephrotoxins  -pt/ot saw, rec moderate, tcc to speak with patient. She lives alone.   -vte ppx lovenox      Hansel Ivy MD

## 2024-05-13 NOTE — CARE PLAN
The patient's goals for the shift include      The clinical goals for the shift include adequate oxygenation for this shift    Over the shift, the patient did not make progress toward the following goals. Barriers to progression include . Recommendations to address these barriers include .

## 2024-05-13 NOTE — PROGRESS NOTES
Physical Therapy    Physical Therapy Treatment    Patient Name: Merna Jaffe  MRN: [id number]  Today's Date: 5/13/2024  Time Calculation  Start Time: 1002  Stop Time: 1018  Time Calculation (min): 16 min    Assessment/Plan   PT Assessment  PT Assessment Results: Decreased strength, Decreased endurance, Impaired balance, Decreased mobility, Decreased safety awareness  Rehab Prognosis: Good  End of Session Communication: Bedside nurse  Assessment Comment: Pt presents with decreased functional mobility secondary to weakness, decreased balance, and decreased safety awareness. Pt is at high risk for falling.  End of Session Patient Position: Up in chair, Alarm on  PT Plan  Inpatient/Swing Bed or Outpatient: Inpatient  PT Plan  Treatment/Interventions: Bed mobility, Transfer training, Gait training, Therapeutic exercise  PT Plan: Skilled PT  PT Frequency: 4 times per week  PT Discharge Recommendations: Moderate intensity level of continued care  PT Recommended Transfer Status: Assist x1, Assistive device      General Visit Information:   PT  Visit  PT Received On: 05/13/24  General  Reason for Referral: impaired mobility  Referred By: PT/OT 5/11 Antionette  Past Medical History Relevant to Rehab: HTN, depression, anxiety  Prior to Session Communication: Bedside nurse  Patient Position Received: Bed, 3 rail up, Alarm on  General Comment: Pt is pleasant and cooperative agreeable to PT treatment with encouragement.    Subjective   Precautions:  Precautions  Medical Precautions: Fall precautions  Vital Signs:       Objective   Pain:  Pain Assessment  Pain Assessment: 0-10  Pain Score: 0 - No pain  Cognition:  Cognition  Orientation Level: Oriented X4  Postural Control:     Extremity/Trunk Assessments:        Activity Tolerance:  Activity Tolerance  Endurance: Decreased tolerance for upright activites  Treatments:  Therapeutic Exercise  Therapeutic Exercise Performed:  (Seated AP and LAQ x 10 ea BLE before pt c/o fatigue)    Bed  Mobility  Bed Mobility:  (Sup>sit with min A and hand over hand to reach for bedrail.)    Ambulation/Gait Training  Ambulation/Gait Training Performed:  (Pt ambulating short distance from bed to chair ~6' with WW/gait belt and mod A. Pt mildly unsteady and retropulsive.)  Transfers  Transfer:  (Sit>stand transfers from EOB with WW and Min A. Cues needing throughout for hand placement. Pt attempts to sit at chair prior to being aligned with it.)    Outcome Measures:  [facility] Basic Mobility  Turning from your back to your side while in a flat bed without using bedrails: A little  Moving from lying on your back to sitting on the side of a flat bed without using bedrails: A little  Moving to and from bed to chair (including a wheelchair): A little  Standing up from a chair using your arms (e.g. wheelchair or bedside chair): A little  To walk in hospital room: A lot  Climbing 3-5 steps with railing: Total  Basic Mobility - Total Score: 15    Education Documentation  Mobility Training, taught by Marya Campos PTA at 5/13/2024  3:03 PM.  Learner: Patient  Readiness: Acceptance  Method: Explanation  Response: Needs Reinforcement    Education Comments  No comments found.        EDUCATION:  Outpatient Education  Individual(s) Educated: Patient  Education Provided: Body Mechanics, Fall Risk, Home Exercise Program, POC    Encounter Problems       Encounter Problems (Active)       PT Problem       Pt will demonstrate sup > sit and sit > sup bed mobility mod I (Progressing)       Start:  05/12/24    Expected End:  05/26/24            Pt will demo sit > stand and stand > sit transfer with ww and mod I  (Progressing)       Start:  05/12/24    Expected End:  05/26/24            Pt will ambulate 25' with ww and mod I, without LOB  (Progressing)       Start:  05/12/24    Expected End:  05/26/24            Pt will demonstrate ability to tolerate 8 minutes of seated or standing therapeutic exercise with 4 or less rest breaks to  demonstrate improved activity tolerance.  (Progressing)       Start:  05/12/24    Expected End:  05/26/24

## 2024-05-13 NOTE — PROGRESS NOTES
Occupational Therapy    Occupational Therapy Treatment    Name: Merna Jaffe  MRN: [id number]  : 1935  Date: 24  Time Calculation  Start Time: 154  Stop Time: 1616  Time Calculation (min): 30 min    Assessment:     Plan:  Treatment Interventions: ADL retraining, Functional transfer training, UE strengthening/ROM, Endurance training, Patient/family training  OT Frequency: 2 times per week  OT Discharge Recommendations: Moderate intensity level of continued care  Equipment Recommended upon Discharge:  (Pt owns FWW, cane, w/c, hospital bed)  OT - OK to Discharge: Yes (ok to d/c to next level of care once medically cleared)    Subjective   Previous Visit Info:     General:  General  Prior to Session Communication: Bedside nurse  Patient Position Received: Bed, 3 rail up, Alarm on  General Comment: Pleasant and agreeable to therapy.  Not interested in sitting up in recliner, at end of session, as pt. states that it's very uncomfortable for her.  Coughed frequently throughout session.  Lightheadedness with coughing.  Needed frequent rest breaks.  Precautions:  Medical Precautions: Fall precautions  Vitals:  Vital Signs  Heart Rate: 63  SpO2: 97 % (room air)  Patient Position: Sitting  Pain Assessment:  Pain Assessment  Pain Assessment: 0-10  Pain Score: 0 - No pain     Objective   Cognition:  Problem Solving: Exceptions to WFL  Complex Functional Tasks: Impaired  Simple Functional Tasks:  (mildly to moderately confused with simple tasks, i.e. hand  hygiene at sink)  Planning: Reduced planning skills  Organization: Moderately disorganized  Activities of Daily Living:      Grooming  Grooming Level of Assistance: Moderate assistance  Grooming Where Assessed: Standing sinkside (up with FWW. Chair behind pt. at sink, seated rest breaks as needed.)  Grooming Comments: Hand hygiene at sink, cues for soap, managing water pressure of sink, drying hands prior to moving hands to walker.  Intermittent hand over hand  assist.  Min assist for dynamic standing balance.    Functional Standing Tolerance:  Functional Standing Tolerance  Activity: Grooming at sink x 6 mins standing in total, with 2 seated rest breaks  Bed Mobility/Transfers: Bed Mobility  Bed Mobility:  (Sup to sit and sit to sup, SBA.  Multiple cues, including tactile cues and increased time for initiation.)    Transfers  Transfer:  (Sit<>stand at EOB, CGA and cues for safe hand placements.  Sit<>stand at BSC and chairs x 3 with repeat cues required for safe hand placements.)    Functional Mobility:  Functional Mobility  Functional Mobility Performed:  (Ambulated between tasks in room: bed to BSC to sink to chair to bed, ~ 25' in total with seated rests.  FWW and min assist.)      Outcome Measures:  [facility] Daily Activity  Putting on and taking off regular lower body clothing: A lot  Bathing (including washing, rinsing, drying): A lot  Putting on and taking off regular upper body clothing: A little  Toileting, which includes using toilet, bedpan or urinal: A lot  Taking care of personal grooming such as brushing teeth: A lot  Eating Meals: A little  Daily Activity - Total Score: 14        Education Documentation  ADL Training, taught by Lady Suarez OT at 5/13/2024  4:45 PM.  Learner: Patient  Readiness: Acceptance  Method: Explanation  Response: Verbalizes Understanding    Education Comments  No comments found.      Goals:  Encounter Problems       Encounter Problems (Active)       OT Goals       Pt will complete LB dressing with MOD I. (Progressing)       Start:  05/12/24    Expected End:  05/26/24            Pt will complete all functional transfers with MOD I. (Progressing)       Start:  05/12/24    Expected End:  05/26/24            Pt will complete all functional mobility with MOD I. (Progressing)       Start:  05/12/24    Expected End:  05/26/24            Pt will tolerate 10 mins of functional activities with 2 or fewer rest breaks. (Progressing)        Start:  05/12/24    Expected End:  05/26/24

## 2024-05-13 NOTE — PROGRESS NOTES
05/13/24 1328   Discharge Planning   Living Arrangements Alone   Support Systems Family members;Children   Assistance Needed therapy PT/OT   Type of Residence Private residence   Home or Post Acute Services Post acute facilities (Rehab/SNF/etc)   Type of Post Acute Facility Services Skilled nursing   Patient expects to be discharged to: SNF /List provided   Does the patient need discharge transport arranged? Yes   RoundTrip coordination needed? Yes   Patient Choice   Provider Choice list and CMS website (https://medicare.gov/care-compare#search) for post-acute Quality and Resource Measure Data were provided and reviewed with: Patient     Met with pt & explained my role. Pt sitting up in recliner chair in room. Admitted for pneumonia. Lives alone, no HHC or aid services. Reports her  passed away , had dementia. Pt reports living alone in 2 level home with 4 LAUREEN with HR. Pt's bedroom/bathroom are located on first floor and laundry in basement. Pt's  passed away in February 2024. Pt has a walk in shower with a seat and grab bars. Pt reports she drives. Does confirm falls at home.  Pt owns a hospital bed, FWW, cane, w/c . [facility] PT 16, moderate intensity. Discussed SNF with pt & provided list, pt discuss with her son. Care transitions team to follow for case progression & dc needs.

## 2024-05-14 LAB
ANION GAP SERPL CALC-SCNC: 13 MMOL/L (ref 10–20)
BACTERIA BLD CULT: NORMAL
BACTERIA BLD CULT: NORMAL
BUN SERPL-MCNC: 62 MG/DL (ref 6–23)
CALCIUM SERPL-MCNC: 8.8 MG/DL (ref 8.6–10.3)
CHLORIDE SERPL-SCNC: 107 MMOL/L (ref 98–107)
CO2 SERPL-SCNC: 20 MMOL/L (ref 21–32)
CREAT SERPL-MCNC: 1.97 MG/DL (ref 0.5–1.05)
EGFRCR SERPLBLD CKD-EPI 2021: 24 ML/MIN/1.73M*2
ERYTHROCYTE [DISTWIDTH] IN BLOOD BY AUTOMATED COUNT: 14.3 % (ref 11.5–14.5)
GLUCOSE BLD MANUAL STRIP-MCNC: 104 MG/DL (ref 74–99)
GLUCOSE BLD MANUAL STRIP-MCNC: 110 MG/DL (ref 74–99)
GLUCOSE BLD MANUAL STRIP-MCNC: 127 MG/DL (ref 74–99)
GLUCOSE BLD MANUAL STRIP-MCNC: 96 MG/DL (ref 74–99)
GLUCOSE SERPL-MCNC: 123 MG/DL (ref 74–99)
HCT VFR BLD AUTO: 29.6 % (ref 36–46)
HGB BLD-MCNC: 9.9 G/DL (ref 12–16)
HOLD SPECIMEN: NORMAL
MAGNESIUM SERPL-MCNC: 1.97 MG/DL (ref 1.6–2.4)
MCH RBC QN AUTO: 32.1 PG (ref 26–34)
MCHC RBC AUTO-ENTMCNC: 33.4 G/DL (ref 32–36)
MCV RBC AUTO: 96 FL (ref 80–100)
NRBC BLD-RTO: 0.3 /100 WBCS (ref 0–0)
PLATELET # BLD AUTO: 343 X10*3/UL (ref 150–450)
POTASSIUM SERPL-SCNC: 3.3 MMOL/L (ref 3.5–5.3)
RBC # BLD AUTO: 3.08 X10*6/UL (ref 4–5.2)
SODIUM SERPL-SCNC: 137 MMOL/L (ref 136–145)
WBC # BLD AUTO: 17.3 X10*3/UL (ref 4.4–11.3)

## 2024-05-14 PROCEDURE — 2500000002 HC RX 250 W HCPCS SELF ADMINISTERED DRUGS (ALT 637 FOR MEDICARE OP, ALT 636 FOR OP/ED): Performed by: STUDENT IN AN ORGANIZED HEALTH CARE EDUCATION/TRAINING PROGRAM

## 2024-05-14 PROCEDURE — 2500000006 HC RX 250 W HCPCS SELF ADMINISTERED DRUGS (ALT 637 FOR ALL PAYERS): Performed by: INTERNAL MEDICINE

## 2024-05-14 PROCEDURE — 2500000005 HC RX 250 GENERAL PHARMACY W/O HCPCS: Performed by: INTERNAL MEDICINE

## 2024-05-14 PROCEDURE — 94640 AIRWAY INHALATION TREATMENT: CPT

## 2024-05-14 PROCEDURE — 1210000001 HC SEMI-PRIVATE ROOM DAILY

## 2024-05-14 PROCEDURE — 85027 COMPLETE CBC AUTOMATED: CPT | Performed by: NURSE PRACTITIONER

## 2024-05-14 PROCEDURE — 2500000004 HC RX 250 GENERAL PHARMACY W/ HCPCS (ALT 636 FOR OP/ED): Performed by: NURSE PRACTITIONER

## 2024-05-14 PROCEDURE — 2500000001 HC RX 250 WO HCPCS SELF ADMINISTERED DRUGS (ALT 637 FOR MEDICARE OP): Performed by: STUDENT IN AN ORGANIZED HEALTH CARE EDUCATION/TRAINING PROGRAM

## 2024-05-14 PROCEDURE — 82947 ASSAY GLUCOSE BLOOD QUANT: CPT

## 2024-05-14 PROCEDURE — 36415 COLL VENOUS BLD VENIPUNCTURE: CPT | Performed by: NURSE PRACTITIONER

## 2024-05-14 PROCEDURE — 99232 SBSQ HOSP IP/OBS MODERATE 35: CPT | Performed by: INTERNAL MEDICINE

## 2024-05-14 PROCEDURE — 97530 THERAPEUTIC ACTIVITIES: CPT | Mod: GP

## 2024-05-14 PROCEDURE — 83735 ASSAY OF MAGNESIUM: CPT | Performed by: NURSE PRACTITIONER

## 2024-05-14 PROCEDURE — 80048 BASIC METABOLIC PNL TOTAL CA: CPT | Performed by: NURSE PRACTITIONER

## 2024-05-14 PROCEDURE — 2500000001 HC RX 250 WO HCPCS SELF ADMINISTERED DRUGS (ALT 637 FOR MEDICARE OP): Performed by: NURSE PRACTITIONER

## 2024-05-14 RX ORDER — POTASSIUM CHLORIDE 20 MEQ/1
20 TABLET, EXTENDED RELEASE ORAL ONCE
Status: COMPLETED | OUTPATIENT
Start: 2024-05-14 | End: 2024-05-14

## 2024-05-14 RX ADMIN — IPRATROPIUM BROMIDE AND ALBUTEROL SULFATE 3 ML: 2.5; .5 SOLUTION RESPIRATORY (INHALATION) at 12:35

## 2024-05-14 RX ADMIN — ACETAMINOPHEN 650 MG: 325 TABLET ORAL at 05:50

## 2024-05-14 RX ADMIN — IPRATROPIUM BROMIDE AND ALBUTEROL SULFATE 3 ML: 2.5; .5 SOLUTION RESPIRATORY (INHALATION) at 21:25

## 2024-05-14 RX ADMIN — IPRATROPIUM BROMIDE AND ALBUTEROL SULFATE 3 ML: 2.5; .5 SOLUTION RESPIRATORY (INHALATION) at 07:06

## 2024-05-14 RX ADMIN — VENLAFAXINE HYDROCHLORIDE 150 MG: 150 CAPSULE, EXTENDED RELEASE ORAL at 09:00

## 2024-05-14 RX ADMIN — ENOXAPARIN SODIUM 30 MG: 30 INJECTION SUBCUTANEOUS at 15:54

## 2024-05-14 RX ADMIN — PANTOPRAZOLE SODIUM 40 MG: 40 TABLET, DELAYED RELEASE ORAL at 05:51

## 2024-05-14 RX ADMIN — CEFTRIAXONE SODIUM 2 G: 2 INJECTION, POWDER, FOR SOLUTION INTRAMUSCULAR; INTRAVENOUS at 11:38

## 2024-05-14 RX ADMIN — OXYCODONE HYDROCHLORIDE 5 MG: 5 TABLET ORAL at 22:14

## 2024-05-14 RX ADMIN — POTASSIUM CHLORIDE 20 MEQ: 1500 TABLET, EXTENDED RELEASE ORAL at 09:00

## 2024-05-14 RX ADMIN — Medication 2 L/MIN: at 21:28

## 2024-05-14 RX ADMIN — OXYCODONE HYDROCHLORIDE 5 MG: 5 TABLET ORAL at 15:54

## 2024-05-14 ASSESSMENT — COGNITIVE AND FUNCTIONAL STATUS - GENERAL
MOVING TO AND FROM BED TO CHAIR: A LOT
CLIMB 3 TO 5 STEPS WITH RAILING: A LOT
STANDING UP FROM CHAIR USING ARMS: A LITTLE
CLIMB 3 TO 5 STEPS WITH RAILING: A LOT
WALKING IN HOSPITAL ROOM: A LOT
TURNING FROM BACK TO SIDE WHILE IN FLAT BAD: A LITTLE
TURNING FROM BACK TO SIDE WHILE IN FLAT BAD: A LITTLE
MOBILITY SCORE: 15
TOILETING: A LOT
STANDING UP FROM CHAIR USING ARMS: A LOT
PERSONAL GROOMING: A LITTLE
WALKING IN HOSPITAL ROOM: A LOT
DAILY ACTIVITIY SCORE: 17
TOILETING: A LOT
DRESSING REGULAR LOWER BODY CLOTHING: A LOT
HELP NEEDED FOR BATHING: A LITTLE
CLIMB 3 TO 5 STEPS WITH RAILING: A LITTLE
DAILY ACTIVITIY SCORE: 17
STANDING UP FROM CHAIR USING ARMS: A LOT
MOBILITY SCORE: 17
HELP NEEDED FOR BATHING: A LITTLE
DRESSING REGULAR UPPER BODY CLOTHING: A LITTLE
MOVING TO AND FROM BED TO CHAIR: A LOT
DRESSING REGULAR LOWER BODY CLOTHING: A LOT
PERSONAL GROOMING: A LITTLE
WALKING IN HOSPITAL ROOM: A LOT
TURNING FROM BACK TO SIDE WHILE IN FLAT BAD: A LITTLE
MOVING TO AND FROM BED TO CHAIR: A LOT
DRESSING REGULAR UPPER BODY CLOTHING: A LITTLE
MOBILITY SCORE: 15

## 2024-05-14 ASSESSMENT — PAIN DESCRIPTION - ORIENTATION
ORIENTATION: RIGHT;LOWER
ORIENTATION: RIGHT
ORIENTATION: RIGHT

## 2024-05-14 ASSESSMENT — PAIN SCALES - GENERAL
PAINLEVEL_OUTOF10: 3
PAINLEVEL_OUTOF10: 10 - WORST POSSIBLE PAIN
PAINLEVEL_OUTOF10: 0 - NO PAIN
PAINLEVEL_OUTOF10: 8
PAINLEVEL_OUTOF10: 8
PAINLEVEL_OUTOF10: 4
PAINLEVEL_OUTOF10: 0 - NO PAIN
PAINLEVEL_OUTOF10: 3

## 2024-05-14 ASSESSMENT — PAIN - FUNCTIONAL ASSESSMENT
PAIN_FUNCTIONAL_ASSESSMENT: 0-10

## 2024-05-14 ASSESSMENT — PAIN DESCRIPTION - DESCRIPTORS: DESCRIPTORS: ACHING

## 2024-05-14 ASSESSMENT — PAIN DESCRIPTION - LOCATION
LOCATION: RIB CAGE
LOCATION: BREAST
LOCATION: RIB CAGE

## 2024-05-14 NOTE — CARE PLAN
The patient's goals for the shift include no pain    The clinical goals for the shift include Patients O2 sat will remain >92 all throughout shift.

## 2024-05-14 NOTE — PROGRESS NOTES
Merna Jaffe is a 88 y.o. female on day 4 of admission presenting with Pneumonia of both lungs due to infectious organism, unspecified part of lung.    Subjective   NAEON. Feeling better. Mild dyspnea.     Objective     Physical Exam  Constitutional:       Appearance: She is not ill-appearing.   HENT:      Head: Normocephalic and atraumatic.   Eyes:      Extraocular Movements: Extraocular movements intact.   Cardiovascular:      Rate and Rhythm: Normal rate and regular rhythm.   Pulmonary:      Breath sounds: No rales.      Comments: Respiratory effort seems better,  Abdominal:      Palpations: Abdomen is soft.   Neurological:      Mental Status: She is alert. Mental status is at baseline.         Last Recorded Vitals  Blood pressure 163/85, pulse 96, temperature 36.8 °C (98.2 °F), temperature source Temporal, resp. rate 18, height 1.524 m (5'), weight 54.4 kg (119 lb 14.9 oz), SpO2 95%.  Intake/Output last 3 Shifts:  No intake/output data recorded.    Relevant Results                Assessment/Plan   Principal Problem:    Pneumonia of both lungs due to infectious organism, unspecified part of lung    88-year-old female was transferred out of the ICU, she was initially admitted for anxiety/depression, acute hypoxemic respiratory failure, multifocal pneumonia, acute pulmonary edema, SANJANA    Patient was transferred out of the ICU yesterday  Currently on room air during the  On ceftriaxone for pneumonia  Currently does not seem to be on oxygen  Echocardiogram reviewed  Did have a component of pulmonary edema making breathing worse  Right now she seems to be in fluid balance  DuoNebs, O2 support as needed will involve  for discharge planning she does need help  Continue home meds for anxiety/depression  Creatinine went up slightly with diuresis, of diuretics, continue to monitor creatinine   continue insulin sliding scale  Strep pneumo, Legionella, MRSA all came back negative, blood cultures have been  negative as well  She continues to have persistent white cell count, does not seem like she is on steroids, procalcitonin was also elevated  If it persistently goes up will need further workup along with changing antibiotics  DVT prophylaxis    5/13  -multifocal pna, appears to be improving, wbc downtrending 15.9k today, no fevers, hds, remains on room air  -cont empiric rocephin, duonebs  -sanjana on ckd3, scr stable today, cont to hold diuretics, she appears euvolemic, avoid nephrotoxins  -pt/ot saw, rec moderate, tcc to speak with patient. She lives alone.   -vte ppx lovenox    5/14  -multifocal PNA, overall stable, but did have small tmax 100.6f overnight, and wbc slight bump to 17.3k today. She was on room air for past 2 days but put on 2L O2 at some point overnight, will trial RA again and see how she does. Cont empiric Rocephin. Cont TID duonebs. Will keep another 24 hours.   -SANJANA on CKD3, scr down trending today to 1.97, cont holding diuretics, avoid nephrotoxins  -hypokalemia 3.3, replete today  -dispo tbd, possibly snf if pt agrees, if not likely home with [facility]  -vte ppx lovenox      Hansel Ivy MD

## 2024-05-14 NOTE — PROGRESS NOTES
05/14/24 1632   Discharge Planning   Home or Post Acute Services Post acute facilities (Rehab/SNF/etc)   Type of Post Acute Facility Services Skilled nursing   Patient expects to be discharged to: SNF  (F[facility] is [facility])   Does the patient need discharge transport arranged? Yes   RoundTrip coordination needed? Yes   Has discharge transport been arranged? No   Patient Choice   Provider Choice list and CMS website (https://medicare.gov/care-compare#search) for post-acute Quality and Resource Measure Data were provided and reviewed with: Patient   Patient / Family choosing to utilize agency / facility established prior to hospitalization Yes     KIMBERLEY met with pt at bedside to review choice list and obtain FOC from pt. [facility] states [facility], referral sent via [email or web address]. SW attempted to contact pt son Michael with pt permission to update on potential discharge plan. Unable to reach Michael and unable to leave VM due to VM being full.

## 2024-05-14 NOTE — PROGRESS NOTES
Physical Therapy    Physical Therapy Treatment    Patient Name: Merna Jaffe  MRN: [id number]  Today's Date: 5/14/2024  Time Calculation  Start Time: 1402  Stop Time: 1428  Time Calculation (min): 26 min       Assessment/Plan   PT Assessment  PT Assessment Results: Decreased strength, Decreased endurance, Impaired balance, Decreased mobility, Decreased safety awareness  Rehab Prognosis: Good  End of Session Communication: Bedside nurse  Assessment Comment: Pt requires min to mod A for mobility this date and frequent cues for safety. Assist for ww management. She c/o increased R ribcage pain with mobility and deep breathing. Encouraged pt to sit up in recliner to improve breathing however pt declines, stating the chair is uncomfortable.  End of Session Patient Position: Bed, 3 rail up, Alarm on  PT Plan  Inpatient/Swing Bed or Outpatient: Inpatient  PT Plan  Treatment/Interventions: Bed mobility, Transfer training, Gait training, Therapeutic exercise  PT Plan: Skilled PT  PT Frequency: 4 times per week  PT Discharge Recommendations: Moderate intensity level of continued care  PT Recommended Transfer Status: Assist x1, Assistive device  PT - OK to Discharge: Yes (PT eval complete, ok to d/c once deemed medically appropriate.)    Current Problem:  Patient Active Problem List   Diagnosis    Pneumonia of both lungs due to infectious organism, unspecified part of lung       General Visit Information:   PT  Visit  PT Received On: 05/14/24  General  Reason for Referral: impaired mobility  Referred By: PT/OT 5/11 Antionette  Past Medical History Relevant to Rehab: HTN, depression, anxiety  Family/Caregiver Present: No  Co-Treatment: OT  Prior to Session Communication: Bedside nurse  Patient Position Received: Bed, 3 rail up, Alarm on  General Comment: Pt agreeable to PT treatment.  Subjective     Precautions:  Precautions  Medical Precautions: Fall precautions, Oxygen therapy device and L/min (1 LPM)    Vital Signs:      Objective     Pain:  Pain Assessment  Pain Assessment: 0-10  Pain Score: 10 - Worst possible pain  Pain Type: Acute pain  Pain Location: Rib cage  Pain Orientation: Right  Pain Interventions: Cold applied, Repositioned    Cognition:  Cognition  Overall Cognitive Status:  (frequent cues to stay on task)  Orientation Level: Oriented X4    Postural Control:  Postural Control  Posture Comment: Retropulsive in standing    Extremity/Trunk Assessments:        RLE   RLE : Within Functional Limits  LLE   LLE : Within Functional Limits    Activity Tolerance:  Activity Tolerance  Endurance:  (Pt tolerates standing for ~1 minute before requiring seated rest break.)    Treatments:  Therapeutic Exercise  Therapeutic Exercise Performed: No        Bed Mobility  Bed Mobility: Yes  Bed Mobility 1  Bed Mobility 1: Supine to sitting, Sitting to supine  Level of Assistance 1: Minimum assistance  Bed Mobility Comments 1: HOB elevated, increased time to complete, cues to center herself once supine  Ambulation/Gait Training  Ambulation/Gait Training Performed: Yes  Ambulation/Gait Training 1  Surface 1: Level tile  Device 1: Rolling walker  Assistance 1: Moderate assistance  Quality of Gait 1: Narrow base of support, Decreased step length, Diminished heel strike, Shuffling gait  Comments/Distance (ft) 1: Pt ambulates ~3' bed > BSC > bed, c/o increased R ribcage pain and difficulty breathing. SpO2 95%. Unsteady, requiring mod A to maintain balance and fatigues quickly.  Transfers  Transfer: Yes  Transfer 1  Transfer From 1: Bed to  Transfer to 1: Chair with arms  Technique 1: Sit to stand, Stand to sit  Transfer Device 1:  (rolling walker)  Transfer Level of Assistance 1: Moderate assistance  Trials/Comments 1: x2 reps, cues for hand placement with ww, assist for balance, assist for lift. Unsteady upon stand.          Outcome Measures:  [facility] Basic Mobility  Turning from your back to your side while in a flat bed without using bedrails:  None  Moving from lying on your back to sitting on the side of a flat bed without using bedrails: A little  Moving to and from bed to chair (including a wheelchair): A lot  Standing up from a chair using your arms (e.g. wheelchair or bedside chair): A lot  To walk in hospital room: A lot  Climbing 3-5 steps with railing: A lot  Basic Mobility - Total Score: 15  Education Documentation  Mobility Training, taught by Alisha Lima PT at 5/14/2024  3:40 PM.  Learner: Patient  Readiness: Acceptance  Method: Explanation  Response: Needs Reinforcement    Education Comments  No comments found.        EDUCATION:  Outpatient Education  Individual(s) Educated: Patient  Education Provided:  (Educated pt on importance of mobility to limit deconditioning.)  Encounter Problems       Encounter Problems (Active)       PT Problem       Pt will demonstrate sup > sit and sit > sup bed mobility mod I (Progressing)       Start:  05/12/24    Expected End:  05/26/24            Pt will demo sit > stand and stand > sit transfer with ww and mod I  (Progressing)       Start:  05/12/24    Expected End:  05/26/24            Pt will ambulate 25' with ww and mod I, without LOB  (Progressing)       Start:  05/12/24    Expected End:  05/26/24            Pt will demonstrate ability to tolerate 8 minutes of seated or standing therapeutic exercise with 4 or less rest breaks to demonstrate improved activity tolerance.  (Progressing)       Start:  05/12/24    Expected End:  05/26/24                    English

## 2024-05-14 NOTE — CARE PLAN
The patient's goals for the shift include no pain    The clinical goals for the shift include labs/blood glucose wnl for patient through end of shift    Over the shift, the patient did not make progress toward the following goals: none. Barriers to progression include n/a. Recommendations to address these barriers include continue current plan of care.

## 2024-05-15 ENCOUNTER — APPOINTMENT (OUTPATIENT)
Dept: RADIOLOGY | Facility: HOSPITAL | Age: 89
DRG: 871 | End: 2024-05-15
Payer: MEDICARE

## 2024-05-15 LAB
ANION GAP SERPL CALC-SCNC: 13 MMOL/L (ref 10–20)
BUN SERPL-MCNC: 52 MG/DL (ref 6–23)
CALCIUM SERPL-MCNC: 9.1 MG/DL (ref 8.6–10.3)
CHLORIDE SERPL-SCNC: 109 MMOL/L (ref 98–107)
CO2 SERPL-SCNC: 20 MMOL/L (ref 21–32)
CREAT SERPL-MCNC: 1.72 MG/DL (ref 0.5–1.05)
EGFRCR SERPLBLD CKD-EPI 2021: 29 ML/MIN/1.73M*2
ERYTHROCYTE [DISTWIDTH] IN BLOOD BY AUTOMATED COUNT: 14.3 % (ref 11.5–14.5)
GLUCOSE BLD MANUAL STRIP-MCNC: 100 MG/DL (ref 74–99)
GLUCOSE BLD MANUAL STRIP-MCNC: 102 MG/DL (ref 74–99)
GLUCOSE BLD MANUAL STRIP-MCNC: 103 MG/DL (ref 74–99)
GLUCOSE BLD MANUAL STRIP-MCNC: 108 MG/DL (ref 74–99)
GLUCOSE BLD MANUAL STRIP-MCNC: 235 MG/DL (ref 74–99)
GLUCOSE SERPL-MCNC: 110 MG/DL (ref 74–99)
HCT VFR BLD AUTO: 31.9 % (ref 36–46)
HGB BLD-MCNC: 10.6 G/DL (ref 12–16)
HOLD SPECIMEN: NORMAL
MAGNESIUM SERPL-MCNC: 2.16 MG/DL (ref 1.6–2.4)
MCH RBC QN AUTO: 32.4 PG (ref 26–34)
MCHC RBC AUTO-ENTMCNC: 33.2 G/DL (ref 32–36)
MCV RBC AUTO: 98 FL (ref 80–100)
NRBC BLD-RTO: 0.2 /100 WBCS (ref 0–0)
PLATELET # BLD AUTO: 335 X10*3/UL (ref 150–450)
POTASSIUM SERPL-SCNC: 3.9 MMOL/L (ref 3.5–5.3)
RBC # BLD AUTO: 3.27 X10*6/UL (ref 4–5.2)
SODIUM SERPL-SCNC: 138 MMOL/L (ref 136–145)
WBC # BLD AUTO: 16.2 X10*3/UL (ref 4.4–11.3)

## 2024-05-15 PROCEDURE — C1751 CATH, INF, PER/CENT/MIDLINE: HCPCS

## 2024-05-15 PROCEDURE — 2500000005 HC RX 250 GENERAL PHARMACY W/O HCPCS: Performed by: INTERNAL MEDICINE

## 2024-05-15 PROCEDURE — 76937 US GUIDE VASCULAR ACCESS: CPT

## 2024-05-15 PROCEDURE — 80048 BASIC METABOLIC PNL TOTAL CA: CPT | Performed by: NURSE PRACTITIONER

## 2024-05-15 PROCEDURE — 94640 AIRWAY INHALATION TREATMENT: CPT

## 2024-05-15 PROCEDURE — 82947 ASSAY GLUCOSE BLOOD QUANT: CPT

## 2024-05-15 PROCEDURE — 2500000001 HC RX 250 WO HCPCS SELF ADMINISTERED DRUGS (ALT 637 FOR MEDICARE OP): Performed by: NURSE PRACTITIONER

## 2024-05-15 PROCEDURE — 99232 SBSQ HOSP IP/OBS MODERATE 35: CPT | Performed by: INTERNAL MEDICINE

## 2024-05-15 PROCEDURE — 85027 COMPLETE CBC AUTOMATED: CPT | Performed by: NURSE PRACTITIONER

## 2024-05-15 PROCEDURE — 83735 ASSAY OF MAGNESIUM: CPT | Performed by: NURSE PRACTITIONER

## 2024-05-15 PROCEDURE — 2500000004 HC RX 250 GENERAL PHARMACY W/ HCPCS (ALT 636 FOR OP/ED): Performed by: INTERNAL MEDICINE

## 2024-05-15 PROCEDURE — 2500000001 HC RX 250 WO HCPCS SELF ADMINISTERED DRUGS (ALT 637 FOR MEDICARE OP): Performed by: STUDENT IN AN ORGANIZED HEALTH CARE EDUCATION/TRAINING PROGRAM

## 2024-05-15 PROCEDURE — 97530 THERAPEUTIC ACTIVITIES: CPT | Mod: GP,CQ | Performed by: PHYSICAL THERAPY ASSISTANT

## 2024-05-15 PROCEDURE — 2500000002 HC RX 250 W HCPCS SELF ADMINISTERED DRUGS (ALT 637 FOR MEDICARE OP, ALT 636 FOR OP/ED): Performed by: STUDENT IN AN ORGANIZED HEALTH CARE EDUCATION/TRAINING PROGRAM

## 2024-05-15 PROCEDURE — 2500000004 HC RX 250 GENERAL PHARMACY W/ HCPCS (ALT 636 FOR OP/ED): Performed by: NURSE PRACTITIONER

## 2024-05-15 PROCEDURE — 1210000001 HC SEMI-PRIVATE ROOM DAILY

## 2024-05-15 PROCEDURE — 97116 GAIT TRAINING THERAPY: CPT | Mod: GP,CQ | Performed by: PHYSICAL THERAPY ASSISTANT

## 2024-05-15 PROCEDURE — 2720000007 HC OR 272 NO HCPCS

## 2024-05-15 PROCEDURE — 36415 COLL VENOUS BLD VENIPUNCTURE: CPT | Performed by: NURSE PRACTITIONER

## 2024-05-15 RX ADMIN — INSULIN LISPRO 2 UNITS: 100 INJECTION, SOLUTION INTRAVENOUS; SUBCUTANEOUS at 17:07

## 2024-05-15 RX ADMIN — ALPRAZOLAM 0.25 MG: 0.25 TABLET ORAL at 21:01

## 2024-05-15 RX ADMIN — Medication 2 L/MIN: at 07:14

## 2024-05-15 RX ADMIN — OXYCODONE HYDROCHLORIDE 5 MG: 5 TABLET ORAL at 11:45

## 2024-05-15 RX ADMIN — IPRATROPIUM BROMIDE AND ALBUTEROL SULFATE 3 ML: 2.5; .5 SOLUTION RESPIRATORY (INHALATION) at 07:14

## 2024-05-15 RX ADMIN — CEFTRIAXONE SODIUM 2 G: 2 INJECTION, POWDER, FOR SOLUTION INTRAMUSCULAR; INTRAVENOUS at 11:45

## 2024-05-15 RX ADMIN — VENLAFAXINE HYDROCHLORIDE 150 MG: 150 CAPSULE, EXTENDED RELEASE ORAL at 09:10

## 2024-05-15 RX ADMIN — PANTOPRAZOLE SODIUM 40 MG: 40 TABLET, DELAYED RELEASE ORAL at 06:12

## 2024-05-15 RX ADMIN — OXYCODONE HYDROCHLORIDE 5 MG: 5 TABLET ORAL at 06:34

## 2024-05-15 RX ADMIN — ENOXAPARIN SODIUM 30 MG: 30 INJECTION SUBCUTANEOUS at 17:06

## 2024-05-15 ASSESSMENT — PAIN DESCRIPTION - LOCATION: LOCATION: RIB CAGE

## 2024-05-15 ASSESSMENT — PAIN SCALES - GENERAL
PAINLEVEL_OUTOF10: 6
PAINLEVEL_OUTOF10: 1
PAINLEVEL_OUTOF10: 9
PAINLEVEL_OUTOF10: 4
PAINLEVEL_OUTOF10: 1

## 2024-05-15 ASSESSMENT — COGNITIVE AND FUNCTIONAL STATUS - GENERAL
HELP NEEDED FOR BATHING: A LITTLE
MOVING TO AND FROM BED TO CHAIR: A LITTLE
DAILY ACTIVITIY SCORE: 19
DRESSING REGULAR UPPER BODY CLOTHING: A LITTLE
PERSONAL GROOMING: A LITTLE
TURNING FROM BACK TO SIDE WHILE IN FLAT BAD: A LITTLE
CLIMB 3 TO 5 STEPS WITH RAILING: A LITTLE
MOBILITY SCORE: 19
TOILETING: A LITTLE
WALKING IN HOSPITAL ROOM: A LITTLE
STANDING UP FROM CHAIR USING ARMS: A LITTLE
DRESSING REGULAR LOWER BODY CLOTHING: A LITTLE

## 2024-05-15 ASSESSMENT — PAIN - FUNCTIONAL ASSESSMENT
PAIN_FUNCTIONAL_ASSESSMENT: 0-10

## 2024-05-15 ASSESSMENT — PAIN DESCRIPTION - ORIENTATION: ORIENTATION: RIGHT

## 2024-05-15 NOTE — PROGRESS NOTES
05/15/24 1613   Current Planned Discharge Disposition   Current Planned Discharge Disposition SNF  ([facility]     Pt accepted to facility. Facility anticipates bed availability by end of the week. Insurance precert requested.

## 2024-05-15 NOTE — PROGRESS NOTES
Physical Therapy    Physical Therapy Treatment    Patient Name: Merna Jaffe  MRN: [id number]  Today's Date: 5/15/2024  Time Calculation  Start Time: 1125  Stop Time: 1152  Time Calculation (min): 27 min    Assessment/Plan   PT Assessment  End of Session Communication: Bedside nurse  End of Session Patient Position: Up in chair  PT Plan  Inpatient/Swing Bed or Outpatient: Inpatient  PT Plan  Treatment/Interventions: Bed mobility, Transfer training, Gait training  PT Plan: Skilled PT  PT Frequency: 4 times per week    General Visit Information:   PT  Visit  PT Received On: 05/15/24  General  Prior to Session Communication: Bedside nurse  Patient Position Received: Bed, 3 rail up  General Comment:  (Pt. agreeable to sit up to chair requesting reqular seat vs recliner.)    Subjective   Precautions:  Precautions  Precautions Comment:  (Falls)  Vital Signs:  Vital Signs  Heart Rate: (!) 115 (after gait trainng required seated rest break for recovery, RN present informed)  Heart Rate Source: Monitor  SpO2: (!) 79 % (pt. PO2 dropped with gait training but recovered to 93 % with plb education and seated rest, RN informed)  Patient Position: Sitting    Objective   Pain:  Pain Assessment  Pain Assessment: 0-10  Pain Score: 6  Pain Location: Rib cage  Pain Orientation:  (right side , RN present to medicate)    Treatments:  Ambulation/Gait Training  Ambulation/Gait Training Performed:  (gait training with min x 1 and fww slow step to pattern 15' x 2 then c/o sob required to sit before completing distance. fair static stand at sink within base of support)  Transfers  Transfer:  (supine to sit min x 1, sit to stand with min, chair <> bsc wth min x 1)     EDUCATION:  Outpatient Education  Individual(s) Educated: Patient  Education Provided: Fall Risk, Posture  Patient Response to Education: Patient/Caregiver Verbalized Understanding of Information  Education Comment:  (placement and posiitioning education for hand placement and  safe use of device, pursed lip breathing for physiological recovery)    Encounter Problems       Encounter Problems (Active)       PT Problem       Pt will demonstrate sup > sit and sit > sup bed mobility mod I (Progressing)       Start:  05/12/24    Expected End:  05/26/24            Pt will demo sit > stand and stand > sit transfer with ww and mod I  (Progressing)       Start:  05/12/24    Expected End:  05/26/24            Pt will ambulate 25' with ww and mod I, without LOB  (Progressing)       Start:  05/12/24    Expected End:  05/26/24            Pt will demonstrate ability to tolerate 8 minutes of seated or standing therapeutic exercise with 4 or less rest breaks to demonstrate improved activity tolerance.  (Progressing)       Start:  05/12/24    Expected End:  05/26/24

## 2024-05-15 NOTE — PROGRESS NOTES
Merna Jaffe is a 88 y.o. female on day 5 of admission presenting with Pneumonia of both lungs due to infectious organism, unspecified part of lung.    Subjective   NAEON. No complaints.     Objective     Physical Exam  Constitutional:       Appearance: She is not ill-appearing.   HENT:      Head: Normocephalic and atraumatic.   Eyes:      Extraocular Movements: Extraocular movements intact.   Cardiovascular:      Rate and Rhythm: Normal rate and regular rhythm.   Pulmonary:      Breath sounds: No rales.      Comments: Respiratory effort seems better,  Abdominal:      Palpations: Abdomen is soft.   Neurological:      Mental Status: She is alert. Mental status is at baseline.         Last Recorded Vitals  Blood pressure 136/86, pulse 98, temperature 36.6 °C (97.9 °F), temperature source Temporal, resp. rate 16, height 1.524 m (5'), weight 54.4 kg (119 lb 14.9 oz), SpO2 95%.  Intake/Output last 3 Shifts:  No intake/output data recorded.    Relevant Results                Assessment/Plan   Principal Problem:    Pneumonia of both lungs due to infectious organism, unspecified part of lung    88-year-old female was transferred out of the ICU, she was initially admitted for anxiety/depression, acute hypoxemic respiratory failure, multifocal pneumonia, acute pulmonary edema, SANJANA    Patient was transferred out of the ICU yesterday  Currently on room air during the  On ceftriaxone for pneumonia  Currently does not seem to be on oxygen  Echocardiogram reviewed  Did have a component of pulmonary edema making breathing worse  Right now she seems to be in fluid balance  DuoNebs, O2 support as needed will involve  for discharge planning she does need help  Continue home meds for anxiety/depression  Creatinine went up slightly with diuresis, of diuretics, continue to monitor creatinine   continue insulin sliding scale  Strep pneumo, Legionella, MRSA all came back negative, blood cultures have been negative as  well  She continues to have persistent white cell count, does not seem like she is on steroids, procalcitonin was also elevated  If it persistently goes up will need further workup along with changing antibiotics  DVT prophylaxis    5/13  -multifocal pna, appears to be improving, wbc downtrending 15.9k today, no fevers, hds, remains on room air  -cont empiric rocephin, duonebs  -sanjana on ckd3, scr stable today, cont to hold diuretics, she appears euvolemic, avoid nephrotoxins  -pt/ot saw, rec moderate, tcc to speak with patient. She lives alone.   -vte ppx lovenox    5/14  -multifocal PNA, overall stable, but did have small tmax 100.6f overnight, and wbc slight bump to 17.3k today. She was on room air for past 2 days but put on 2L O2 at some point overnight, will trial RA again and see how she does. Cont empiric Rocephin. Cont TID duonebs. Will keep another 24 hours.   -SANJANA on CKD3, scr down trending today to 1.97, cont holding diuretics, avoid nephrotoxins  -hypokalemia 3.3, replete today  -dispo tbd, possibly snf if pt agrees, if not likely home with [facility]  -vte ppx lovenox    5/15  -improving, afebrile x24h, wbc down trend, remains on room air  -finish x7d rocephin (eot 5/16), will switch to PO agent if able to be discharged today  -sanjana on ckd3 continues to improve, scr 1.72 today  -snf dispo, pending acceptance and precert  -medically she is ready for discharge      Hansel Ivy MD

## 2024-05-15 NOTE — PROGRESS NOTES
05/15/24 1511   Discharge Planning   Type of Post Acute Facility Services Skilled nursing   Patient expects to be discharged to: SNF [facility]   Does the patient need discharge transport arranged? Yes   RoundTrip coordination needed? Yes   Patient Choice   Provider Choice list and CMS website (https://medicare.gov/care-compare#search) for post-acute Quality and Resource Measure Data were provided and reviewed with: Patient     Notified pt SNF [facility] no beds available. Discussed SNF list & pt chose [facility]. Referral sent.  Pending acceptance will require precert.

## 2024-05-16 LAB
ANION GAP SERPL CALC-SCNC: 14 MMOL/L (ref 10–20)
BUN SERPL-MCNC: 50 MG/DL (ref 6–23)
CALCIUM SERPL-MCNC: 9.1 MG/DL (ref 8.6–10.3)
CHLORIDE SERPL-SCNC: 108 MMOL/L (ref 98–107)
CO2 SERPL-SCNC: 19 MMOL/L (ref 21–32)
CREAT SERPL-MCNC: 1.7 MG/DL (ref 0.5–1.05)
EGFRCR SERPLBLD CKD-EPI 2021: 29 ML/MIN/1.73M*2
ERYTHROCYTE [DISTWIDTH] IN BLOOD BY AUTOMATED COUNT: 14.5 % (ref 11.5–14.5)
GLUCOSE BLD MANUAL STRIP-MCNC: 100 MG/DL (ref 74–99)
GLUCOSE BLD MANUAL STRIP-MCNC: 113 MG/DL (ref 74–99)
GLUCOSE BLD MANUAL STRIP-MCNC: 116 MG/DL (ref 74–99)
GLUCOSE BLD MANUAL STRIP-MCNC: 99 MG/DL (ref 74–99)
GLUCOSE SERPL-MCNC: 100 MG/DL (ref 74–99)
HCT VFR BLD AUTO: 29.7 % (ref 36–46)
HGB BLD-MCNC: 9.7 G/DL (ref 12–16)
HOLD SPECIMEN: NORMAL
MAGNESIUM SERPL-MCNC: 2.1 MG/DL (ref 1.6–2.4)
MCH RBC QN AUTO: 32 PG (ref 26–34)
MCHC RBC AUTO-ENTMCNC: 32.7 G/DL (ref 32–36)
MCV RBC AUTO: 98 FL (ref 80–100)
NRBC BLD-RTO: 0 /100 WBCS (ref 0–0)
PLATELET # BLD AUTO: 321 X10*3/UL (ref 150–450)
POTASSIUM SERPL-SCNC: 3.6 MMOL/L (ref 3.5–5.3)
RBC # BLD AUTO: 3.03 X10*6/UL (ref 4–5.2)
SODIUM SERPL-SCNC: 137 MMOL/L (ref 136–145)
WBC # BLD AUTO: 15.7 X10*3/UL (ref 4.4–11.3)

## 2024-05-16 PROCEDURE — 2500000001 HC RX 250 WO HCPCS SELF ADMINISTERED DRUGS (ALT 637 FOR MEDICARE OP): Performed by: STUDENT IN AN ORGANIZED HEALTH CARE EDUCATION/TRAINING PROGRAM

## 2024-05-16 PROCEDURE — 2500000001 HC RX 250 WO HCPCS SELF ADMINISTERED DRUGS (ALT 637 FOR MEDICARE OP): Performed by: NURSE PRACTITIONER

## 2024-05-16 PROCEDURE — 83735 ASSAY OF MAGNESIUM: CPT | Performed by: NURSE PRACTITIONER

## 2024-05-16 PROCEDURE — 82947 ASSAY GLUCOSE BLOOD QUANT: CPT

## 2024-05-16 PROCEDURE — 99239 HOSP IP/OBS DSCHRG MGMT >30: CPT | Performed by: INTERNAL MEDICINE

## 2024-05-16 PROCEDURE — 85027 COMPLETE CBC AUTOMATED: CPT | Performed by: NURSE PRACTITIONER

## 2024-05-16 PROCEDURE — 1210000001 HC SEMI-PRIVATE ROOM DAILY

## 2024-05-16 PROCEDURE — 2500000004 HC RX 250 GENERAL PHARMACY W/ HCPCS (ALT 636 FOR OP/ED): Performed by: NURSE PRACTITIONER

## 2024-05-16 PROCEDURE — 80048 BASIC METABOLIC PNL TOTAL CA: CPT | Performed by: NURSE PRACTITIONER

## 2024-05-16 PROCEDURE — 2500000004 HC RX 250 GENERAL PHARMACY W/ HCPCS (ALT 636 FOR OP/ED): Performed by: INTERNAL MEDICINE

## 2024-05-16 PROCEDURE — 36415 COLL VENOUS BLD VENIPUNCTURE: CPT | Performed by: NURSE PRACTITIONER

## 2024-05-16 RX ADMIN — OXYCODONE HYDROCHLORIDE 5 MG: 5 TABLET ORAL at 03:38

## 2024-05-16 RX ADMIN — OXYCODONE HYDROCHLORIDE 5 MG: 5 TABLET ORAL at 20:50

## 2024-05-16 RX ADMIN — VENLAFAXINE HYDROCHLORIDE 150 MG: 150 CAPSULE, EXTENDED RELEASE ORAL at 09:20

## 2024-05-16 RX ADMIN — Medication 1 LOZENGE: at 20:50

## 2024-05-16 RX ADMIN — CEFTRIAXONE SODIUM 2 G: 2 INJECTION, POWDER, FOR SOLUTION INTRAMUSCULAR; INTRAVENOUS at 11:58

## 2024-05-16 RX ADMIN — ENOXAPARIN SODIUM 30 MG: 30 INJECTION SUBCUTANEOUS at 16:58

## 2024-05-16 RX ADMIN — PANTOPRAZOLE SODIUM 40 MG: 40 TABLET, DELAYED RELEASE ORAL at 06:14

## 2024-05-16 ASSESSMENT — PAIN SCALES - GENERAL
PAINLEVEL_OUTOF10: 0 - NO PAIN
PAINLEVEL_OUTOF10: 0 - NO PAIN
PAINLEVEL_OUTOF10: 7
PAINLEVEL_OUTOF10: 8

## 2024-05-16 ASSESSMENT — COGNITIVE AND FUNCTIONAL STATUS - GENERAL
WALKING IN HOSPITAL ROOM: A LITTLE
DRESSING REGULAR LOWER BODY CLOTHING: A LITTLE
TURNING FROM BACK TO SIDE WHILE IN FLAT BAD: A LITTLE
CLIMB 3 TO 5 STEPS WITH RAILING: A LITTLE
CLIMB 3 TO 5 STEPS WITH RAILING: A LITTLE
MOBILITY SCORE: 19
MOVING TO AND FROM BED TO CHAIR: A LITTLE
DAILY ACTIVITIY SCORE: 19
STANDING UP FROM CHAIR USING ARMS: A LITTLE
HELP NEEDED FOR BATHING: A LITTLE
STANDING UP FROM CHAIR USING ARMS: A LITTLE
PERSONAL GROOMING: A LITTLE
MOBILITY SCORE: 19
MOVING TO AND FROM BED TO CHAIR: A LITTLE
TURNING FROM BACK TO SIDE WHILE IN FLAT BAD: A LITTLE
WALKING IN HOSPITAL ROOM: A LITTLE
DRESSING REGULAR UPPER BODY CLOTHING: A LITTLE
TOILETING: A LITTLE

## 2024-05-16 ASSESSMENT — PAIN - FUNCTIONAL ASSESSMENT
PAIN_FUNCTIONAL_ASSESSMENT: 0-10

## 2024-05-16 ASSESSMENT — PAIN DESCRIPTION - ORIENTATION: ORIENTATION: RIGHT

## 2024-05-16 ASSESSMENT — PAIN DESCRIPTION - DESCRIPTORS: DESCRIPTORS: ACHING

## 2024-05-16 NOTE — PROGRESS NOTES
Nutrition Follow Up Assessment:   Nutrition Assessment         Patient is a 88 y.o. female presenting with pneumonia of both lungs      Nutrition History:  Energy Intake: Fair 50-75 %  Food and Nutrient History: RD follow-up. Pt reports continued poor appetite, though says has been eating 25-50% at meals. Likes Ensure mixed with lemon-lime soda. Says has been doing well with Easy to Chew diet, able to chew foods on trays. Declining increasing Ensure frequency.  Food Allergies/Intolerances:   lactose intolerance  GI Symptoms: None  Oral Problems:  Easy to Chew d/t poor dentition       Anthropometrics:  Height: 152.4 cm (5')   Weight: 54.4 kg (119 lb 14.9 oz)   BMI (Calculated): 23.42           Weight Change %:  Weight History / % Weight Change: No new wt since last RD note    Nutrition Focused Physical Exam Findings:  defer: previously completed    Edema:  Edema: +1 trace, +2 mild  Edema Location: non-pitting RLE, 2+ LLE edema per nursing assessment  Physical Findings:  Skin: Negative (for pressure injuries)    Nutrition Significant Labs:  BMP Trend:   Results from last 7 days   Lab Units 05/16/24  0530 05/15/24  0627 05/14/24  0609 05/13/24  0523   GLUCOSE mg/dL 100* 110* 123* 88   CALCIUM mg/dL 9.1 9.1 8.8 9.2   SODIUM mmol/L 137 138 137 139   POTASSIUM mmol/L 3.6 3.9 3.3* 3.5   CO2 mmol/L 19* 20* 20* 18*   CHLORIDE mmol/L 108* 109* 107 110*   BUN mg/dL 50* 52* 62* 79*   CREATININE mg/dL 1.70* 1.72* 1.97* 2.47*        Nutrition Specific Medications:  Reviewed    I/O:    ;      Dietary Orders (From admission, onward)       Start     Ordered    05/13/24 1527  Adult diet Regular; Easy to chew  Diet effective now        Question Answer Comment   Diet type Regular    Texture Easy to chew        05/13/24 1526    05/13/24 1117  Oral nutritional supplements  Until discontinued        Comments: Strawberry only   Question Answer Comment   Deliver with Breakfast    Select supplement: Ensure Plus High Protein        05/13/24  1116                     Estimated Needs:   Total Energy Estimated Needs (kCal): 1500 kCal  Method for Estimating Needs: 0880-9278 kcal (25-30 kcal/kg)  Total Protein Estimated Needs (g): 50 g  Method for Estimating Needs: 44-54g (0.8-1 g/kg)  Total Fluid Estimated Needs (mL): 1500 mL  Method for Estimating Needs: 1 ml/kcal or per MD        Nutrition Diagnosis   Malnutrition Diagnosis  Patient has Malnutrition Diagnosis: No (insufficient data to diagnose)    Nutrition Diagnosis  Patient has Nutrition Diagnosis: Yes  Diagnosis Status (1): Ongoing  Nutrition Diagnosis 1: Inadequate oral intake  Related to (1): decreased appetite with aging process  As Evidenced by (1): eating <75% at meals, need for oral nutritional supplements       Nutrition Interventions/Recommendations         Nutrition Prescription:  Individualized Nutrition Prescription Provided for : Regular diet. Oral nutritional supplements.        Nutrition Interventions:   Interventions: Meals and snacks, Medical food supplement  Goal: Consumes 3 meals per day  Medical Food Supplement: Commercial beverage  Goal: Ensure Plus High Protein daily (provides 350 kcal, 20 g protein per serving)        Nutrition Education:      Denied need for diet education    Nutrition Monitoring and Evaluation   Food/Nutrient Related History Monitoring  Monitoring and Evaluation Plan: Energy intake, Amount of food, Fluid intake  Energy Intake: Estimated energy intake  Criteria: Pt meets >75% of estimated energy needs  Fluid Intake: Estimated fluid intake  Criteria: Meets >75% of estimated fluid needs  Amount of Food: Estimated amout of food, Medical food intake  Criteria: Pt consumes >50% of meals and supplements    Body Composition/Growth/Weight History  Monitoring and Evaluation Plan: Weight  Weight: Measured weight  Criteria: Maintains stable weight    Biochemical Data, Medical Tests and Procedures  Monitoring and Evaluation Plan: Glucose/endocrine profile, Electrolyte/renal  panel  Electrolyte and Renal Panel: Sodium, Potassium, Phosphorus  Criteria: Electrolytes WNL  Glucose/Endocrine Profile: Glucose, casual  Criteria: BG within desirable range            Time Spent/Follow-up Reminder:   Time Spent (min): 30 minutes  Last Date of Nutrition Visit: 05/16/24  Nutrition Follow-Up Needed?: 3-8 days

## 2024-05-16 NOTE — PROGRESS NOTES
05/16/24 1329   Current Planned Discharge Disposition   Current Planned Discharge Disposition SNF  ([facility])     Pt has been accepted to the facility. Insurance precert is pending. Facility confirms bed will be available tomorrow. Pt aware. SW attempted to contact pt son Michael again to update, unable to reach him and unable to leave a message due to mailbox being full.

## 2024-05-16 NOTE — DISCHARGE SUMMARY
DISCHARGE DIAGNOSIS     Acute hypoxemic respiratory failure 2/2 multifocal CAP  SANJANA on CKD3  Acute pulmonary edema    HOSPITAL COURSE AND DETAILS     88F with PMH of HTN, CKD3, depression who presented with SOB, nonproductive cough. She was hypoxemic to 70s on RA, improved with supplemental O2. There was b/l multifocal PNA vs pulm edema on imaging. Had SANJANA on CKD, leukocytosis. She became agitated shortly after admission, worsening resp distress, was placed on BiPAP and admitted to ICU. She was treated with empiric IV Rocephin and IV Lasix with improvement. Transferred to the floor. TTE showed normal LVEF, possible diastolic dysfunction. SANJANA improved, has baseline ckd3. Diuretics were stopped, euvolemic. She was weaned off of oxygen to room air and satting well. WBC was down trending, and remained afebrile for >48h. Completed x7d course of empiric rocephin for PNA, bcx were all neg, strep pneumo, legionella neg. She was seen by PT/OT, rec for SNF, pending precert. She is stable for dc to SNF. Total time spent on discharge services 33 minutes.     DISCHARGE PHYSICAL EXAM     Last Recorded Vitals:  Vitals:    05/15/24 1351 05/15/24 1907 05/15/24 2058 05/16/24 0345   BP: 132/81 (!) 158/92 139/88 164/87   BP Location: Left arm Left arm Left arm Right arm   Patient Position: Sitting Sitting Sitting Lying   Pulse: 99 96 95 94   Resp: 18 18 18 18   Temp: 37.4 °C (99.3 °F) 35.8 °C (96.4 °F) 36.5 °C (97.7 °F) 36.4 °C (97.5 °F)   TempSrc: Temporal Temporal Temporal Temporal   SpO2: 91% 95% 97% 98%   Weight:       Height:           Physical Exam:  GEN: appears stated age, NAD  CV: RRR, no m/r/g, no LE edema  LUNGS: CTAB, no w/r/c  ABD: soft, NT, ND, NBS  SKIN: no rashes  MSK; no gross deformities, normal joints  NEURO: A+Ox3, no FND  PSYCH: appropriate mood, affect      PERTINENT LABS AND IMAGING     Results for orders placed or performed during the hospital encounter of 05/10/24 (from the past 96 hour(s))   POCT GLUCOSE    Result Value Ref Range    POCT Glucose 138 (H) 74 - 99 mg/dL   POCT GLUCOSE   Result Value Ref Range    POCT Glucose 117 (H) 74 - 99 mg/dL   Electrocardiogram, 12-lead PRN ACS symptoms   Result Value Ref Range    Ventricular Rate 94 BPM    Atrial Rate 94 BPM    MT Interval 114 ms    QRS Duration 86 ms    QT Interval 368 ms    QTC Calculation(Bazett) 460 ms    P Axis 53 degrees    R Axis 35 degrees    T Axis 57 degrees    QRS Count 16 beats    Q Onset 226 ms    P Onset 169 ms    P Offset 218 ms    T Offset 410 ms    QTC Fredericia 427 ms   Troponin I, High Sensitivity   Result Value Ref Range    Troponin I, High Sensitivity 10 0 - 13 ng/L   SST TOP   Result Value Ref Range    Extra Tube Hold for add-ons.    POCT GLUCOSE   Result Value Ref Range    POCT Glucose 114 (H) 74 - 99 mg/dL   Basic Metabolic Panel   Result Value Ref Range    Glucose 88 74 - 99 mg/dL    Sodium 139 136 - 145 mmol/L    Potassium 3.5 3.5 - 5.3 mmol/L    Chloride 110 (H) 98 - 107 mmol/L    Bicarbonate 18 (L) 21 - 32 mmol/L    Anion Gap 15 10 - 20 mmol/L    Urea Nitrogen 79 (H) 6 - 23 mg/dL    Creatinine 2.47 (H) 0.50 - 1.05 mg/dL    eGFR 18 (L) >60 mL/min/1.73m*2    Calcium 9.2 8.6 - 10.3 mg/dL   CBC   Result Value Ref Range    WBC 15.9 (H) 4.4 - 11.3 x10*3/uL    nRBC 0.3 (H) 0.0 - 0.0 /100 WBCs    RBC 2.97 (L) 4.00 - 5.20 x10*6/uL    Hemoglobin 9.7 (L) 12.0 - 16.0 g/dL    Hematocrit 28.7 (L) 36.0 - 46.0 %    MCV 97 80 - 100 fL    MCH 32.7 26.0 - 34.0 pg    MCHC 33.8 32.0 - 36.0 g/dL    RDW 14.4 11.5 - 14.5 %    Platelets 331 150 - 450 x10*3/uL   Magnesium   Result Value Ref Range    Magnesium 2.11 1.60 - 2.40 mg/dL   SST TOP   Result Value Ref Range    Extra Tube Hold for add-ons.    SST TOP   Result Value Ref Range    Extra Tube Hold for add-ons.    SST TOP   Result Value Ref Range    Extra Tube Hold for add-ons.    POCT GLUCOSE   Result Value Ref Range    POCT Glucose 78 74 - 99 mg/dL   POCT GLUCOSE   Result Value Ref Range    POCT Glucose 79  74 - 99 mg/dL   POCT GLUCOSE   Result Value Ref Range    POCT Glucose 102 (H) 74 - 99 mg/dL   POCT GLUCOSE   Result Value Ref Range    POCT Glucose 119 (H) 74 - 99 mg/dL   B-type natriuretic peptide   Result Value Ref Range    BNP 71 0 - 99 pg/mL   SST TOP   Result Value Ref Range    Extra Tube Hold for add-ons.    Basic Metabolic Panel   Result Value Ref Range    Glucose 123 (H) 74 - 99 mg/dL    Sodium 137 136 - 145 mmol/L    Potassium 3.3 (L) 3.5 - 5.3 mmol/L    Chloride 107 98 - 107 mmol/L    Bicarbonate 20 (L) 21 - 32 mmol/L    Anion Gap 13 10 - 20 mmol/L    Urea Nitrogen 62 (H) 6 - 23 mg/dL    Creatinine 1.97 (H) 0.50 - 1.05 mg/dL    eGFR 24 (L) >60 mL/min/1.73m*2    Calcium 8.8 8.6 - 10.3 mg/dL   CBC   Result Value Ref Range    WBC 17.3 (H) 4.4 - 11.3 x10*3/uL    nRBC 0.3 (H) 0.0 - 0.0 /100 WBCs    RBC 3.08 (L) 4.00 - 5.20 x10*6/uL    Hemoglobin 9.9 (L) 12.0 - 16.0 g/dL    Hematocrit 29.6 (L) 36.0 - 46.0 %    MCV 96 80 - 100 fL    MCH 32.1 26.0 - 34.0 pg    MCHC 33.4 32.0 - 36.0 g/dL    RDW 14.3 11.5 - 14.5 %    Platelets 343 150 - 450 x10*3/uL   Magnesium   Result Value Ref Range    Magnesium 1.97 1.60 - 2.40 mg/dL   POCT GLUCOSE   Result Value Ref Range    POCT Glucose 104 (H) 74 - 99 mg/dL   POCT GLUCOSE   Result Value Ref Range    POCT Glucose 96 74 - 99 mg/dL   POCT GLUCOSE   Result Value Ref Range    POCT Glucose 127 (H) 74 - 99 mg/dL   POCT GLUCOSE   Result Value Ref Range    POCT Glucose 110 (H) 74 - 99 mg/dL   Basic Metabolic Panel   Result Value Ref Range    Glucose 110 (H) 74 - 99 mg/dL    Sodium 138 136 - 145 mmol/L    Potassium 3.9 3.5 - 5.3 mmol/L    Chloride 109 (H) 98 - 107 mmol/L    Bicarbonate 20 (L) 21 - 32 mmol/L    Anion Gap 13 10 - 20 mmol/L    Urea Nitrogen 52 (H) 6 - 23 mg/dL    Creatinine 1.72 (H) 0.50 - 1.05 mg/dL    eGFR 29 (L) >60 mL/min/1.73m*2    Calcium 9.1 8.6 - 10.3 mg/dL   Magnesium   Result Value Ref Range    Magnesium 2.16 1.60 - 2.40 mg/dL   SST TOP   Result Value Ref Range     Extra Tube Hold for add-ons.    CBC   Result Value Ref Range    WBC 16.2 (H) 4.4 - 11.3 x10*3/uL    nRBC 0.2 (H) 0.0 - 0.0 /100 WBCs    RBC 3.27 (L) 4.00 - 5.20 x10*6/uL    Hemoglobin 10.6 (L) 12.0 - 16.0 g/dL    Hematocrit 31.9 (L) 36.0 - 46.0 %    MCV 98 80 - 100 fL    MCH 32.4 26.0 - 34.0 pg    MCHC 33.2 32.0 - 36.0 g/dL    RDW 14.3 11.5 - 14.5 %    Platelets 335 150 - 450 x10*3/uL   POCT GLUCOSE   Result Value Ref Range    POCT Glucose 102 (H) 74 - 99 mg/dL   POCT GLUCOSE   Result Value Ref Range    POCT Glucose 103 (H) 74 - 99 mg/dL   POCT GLUCOSE   Result Value Ref Range    POCT Glucose 235 (H) 74 - 99 mg/dL   POCT GLUCOSE   Result Value Ref Range    POCT Glucose 100 (H) 74 - 99 mg/dL   POCT GLUCOSE   Result Value Ref Range    POCT Glucose 108 (H) 74 - 99 mg/dL   Basic Metabolic Panel   Result Value Ref Range    Glucose 100 (H) 74 - 99 mg/dL    Sodium 137 136 - 145 mmol/L    Potassium 3.6 3.5 - 5.3 mmol/L    Chloride 108 (H) 98 - 107 mmol/L    Bicarbonate 19 (L) 21 - 32 mmol/L    Anion Gap 14 10 - 20 mmol/L    Urea Nitrogen 50 (H) 6 - 23 mg/dL    Creatinine 1.70 (H) 0.50 - 1.05 mg/dL    eGFR 29 (L) >60 mL/min/1.73m*2    Calcium 9.1 8.6 - 10.3 mg/dL   CBC   Result Value Ref Range    WBC 15.7 (H) 4.4 - 11.3 x10*3/uL    nRBC 0.0 0.0 - 0.0 /100 WBCs    RBC 3.03 (L) 4.00 - 5.20 x10*6/uL    Hemoglobin 9.7 (L) 12.0 - 16.0 g/dL    Hematocrit 29.7 (L) 36.0 - 46.0 %    MCV 98 80 - 100 fL    MCH 32.0 26.0 - 34.0 pg    MCHC 32.7 32.0 - 36.0 g/dL    RDW 14.5 11.5 - 14.5 %    Platelets 321 150 - 450 x10*3/uL   Magnesium   Result Value Ref Range    Magnesium 2.10 1.60 - 2.40 mg/dL   SST TOP   Result Value Ref Range    Extra Tube Hold for add-ons.    POCT GLUCOSE   Result Value Ref Range    POCT Glucose 100 (H) 74 - 99 mg/dL        Bedside Peripheral IV Imaging   Final Result      Transthoracic Echo (TTE) Complete   Final Result      XR chest 1 view   Final Result   Bilateral parenchymal infiltration. Large hiatal hernia.         MACRO:   none        Signed by: Kayla Matias 5/11/2024 9:08 AM   Dictation workstation:   [id number]      XR chest 1 view   Final Result   Findings suggestive of pulmonary edema. Correlate clinically.        MACRO:   none        Signed by: Kayla Matias 5/10/2024 2:47 PM   Dictation workstation:   [id number]      CT chest wo IV contrast   Final Result   Findings of bronchitis and acute bilateral pneumonia as described.        Mild nonspecific mediastinal adenopathy, perhaps   infectious/inflammatory or reactive. Cannot accurately assess either   hilum without IV contrast.        Moderate-sized hiatal hernia.        Mild arthritic changes in the mid to distal thoracic spine.        MACRO:   None        Signed by: Festus Mckenna 5/10/2024 1:12 PM   Dictation workstation:   [id number]      XR chest 1 view   Final Result   Suggestion of mild bilateral patchy nonspecific ground-glass opacity.   There is also suggestion of minimal alveolar consolidation in the mid   left lung. Findings can be seen with atypical pneumonia or possibly   early pneumonia.        Signed by: Simone Fernandez 5/10/2024 12:17 PM   Dictation workstation:   [id number]          Transthoracic Echo (TTE) Complete    Result Date: 5/11/2024          [place] [address]  Tel [phone] Fax [phone] TRANSTHORACIC ECHOCARDIOGRAM REPORT  Patient Name:      MOE Lopez Physician:    [id number] Arian Donis MD Study Date:        5/11/2024             Ordering Provider:    [id number] SHARON DRAKE MRN/PID:           [id number]              Fellow: Accession#:        [id number]          Nurse: Date of Birth/Age: 1935 / 88 years  Sonographer:          Alisha Mason                                                                 [facility] Gender:            F                     Additional Staff: Height:            152.40 cm             Admit Date:           5/10/2024 Weight:            53.98 kg              Admission Status:     Inpatient -                                                                Routine BSA / BMI:         1.50 m2 / 23.24 kg/m2 [facility] Blood Pressure: 117 /70 mmHg Study Type:    TRANSTHORACIC ECHO (TTE) COMPLETE Diagnosis/ICD: Acute respiratory failure with hypoxia-J96.01 Indication:    Respiratory failure CPT Codes:     Echo Complete w Full [id number] Patient History: Pertinent History: HTN and Dyspnea. Study Detail: The following Echo studies were performed: 2D, M-Mode, Doppler and               color flow. Technically challenging study due to body habitus and               prominent lung artifact. Agitated saline used as a contrast agent               for intraseptal flow evaluation.  PHYSICIAN INTERPRETATION: Left Ventricle: Left ventricular systolic function is normal, with an estimated ejection fraction of 55-60%. There are no regional wall motion abnormalities. The left ventricular cavity size is normal. Spectral Doppler shows an impaired relaxation pattern of left ventricular diastolic filling. Left Atrium: The left atrium is normal in size. A bubble study using agitated saline was performed. Bubble study is negative. Negative bubble study. Right Ventricle: The right ventricle is mildly enlarged. There is normal right ventricular global systolic function. Right Atrium: The right atrium is mildly dilated. Aortic Valve: The aortic valve is trileaflet. There is no evidence of aortic valve regurgitation. The peak instantaneous gradient of the aortic valve is 7.6 mmHg. The mean gradient of the aortic valve is 4.0 mmHg. Mitral Valve: The mitral valve is mild to moderately thickened. There is no evidence of mitral valve regurgitation. Tricuspid Valve: The  tricuspid valve is structurally normal. There is moderate tricuspid regurgitation. Pulmonic Valve: The pulmonic valve is not well visualized. There is trace pulmonic valve regurgitation. Pericardium: There is no pericardial effusion noted. Aorta: The aortic root is normal. Systemic Veins: The inferior vena cava appears to be of normal size. There is IVC inspiratory collapse greater than 50%.  CONCLUSIONS:  1. Left ventricular systolic function is normal with a 55-60% estimated ejection fraction.  2. Spectral Doppler shows an impaired relaxation pattern of left ventricular diastolic filling.  3. Negative bubble study.  4. Moderate tricuspid regurgitation. QUANTITATIVE DATA SUMMARY: 2D MEASUREMENTS:                          Normal Ranges: Ao Root d:     2.10 cm   (2.0-3.7cm) LAs:           2.60 cm   (2.7-4.0cm) IVSd:          1.19 cm   (0.6-1.1cm) LVPWd:         1.07 cm   (0.6-1.1cm) LVIDd:         3.63 cm   (3.9-5.9cm) LVIDs:         2.36 cm LV Mass Index: 87.4 g/m2 LV % FS        35.0 % LA VOLUME:                               Normal Ranges: LA Vol A4C:        29.4 ml    (22+/-6mL/m2) LA Vol A2C:        24.5 ml LA Vol BP:         28.2 ml LA Vol Index A4C:  19.7ml/m2 LA Vol Index A2C:  16.4 ml/m2 LA Vol Index BP:   18.8 ml/m2 LA Area A4C:       12.2 cm2 LA Area A2C:       10.6 cm2 LA Major Axis A4C: 4.3 cm LA Major Axis A2C: 3.9 cm LA Volume Index:   17.1 ml/m2 RA VOLUME BY A/L METHOD:                               Normal Ranges: RA Vol A4C:        27.2 ml    (8.3-19.5ml) RA Vol Index A4C:  18.2 ml/m2 RA Area A4C:       12.0 cm2 RA Major Axis A4C: 4.5 cm LV SYSTOLIC FUNCTION BY 2D PLANIMETRY (MOD):                     Normal Ranges: EF-A4C View: 56.8 % (>=55%) EF-A2C View: 55.7 % EF-Biplane:  57.1 % LV DIASTOLIC FUNCTION:                        Normal Ranges: MV Peak E:    0.70 m/s (0.7-1.2 m/s) MV Peak A:    0.92 m/s (0.42-0.7 m/s) E/A Ratio:    0.75     (1.0-2.2) MV e'         0.09 m/s (>8.0) MV lateral e' 0.09 m/s  MV medial e'  0.04 m/s E/e' Ratio:   7.88     (<8.0) MITRAL VALVE:                 Normal Ranges: MV DT: 238 msec (150-240msec) AORTIC VALVE:                                   Normal Ranges: AoV Vmax:                1.38 m/s (<=1.7m/s) AoV Peak P.6 mmHg (<20mmHg) AoV Mean P.0 mmHg (1.7-11.5mmHg) LVOT Max Josue:            0.91 m/s (<=1.1m/s) AoV VTI:                 25.20 cm (18-25cm) LVOT VTI:                19.00 cm LVOT Diameter:           2.00 cm  (1.8-2.4cm) AoV Area, VTI:           2.37 cm2 (2.5-5.5cm2) AoV Area,Vmax:           2.06 cm2 (2.5-4.5cm2) AoV Dimensionless Index: 0.75  RIGHT VENTRICLE: RV Basal 3.60 cm RV Mid   2.64 cm RV Major 5.4 cm TAPSE:   20.3 mm RV s'    0.20 m/s TRICUSPID VALVE/RVSP:                             Normal Ranges: Peak TR Velocity: 2.77 m/s RV Syst Pressure: 33.7 mmHg (< 30mmHg) PULMONIC VALVE:                         Normal Ranges: PV Accel Time: 95 msec  (>120ms) PV Max Josue:    0.9 m/s  (0.6-0.9m/s) PV Max PG:     3.3 mmHg  [id number] Arian Donis MD Electronically signed on 2024 at 12:04:51 PM  ** Final **      DISCHARGE MEDICATIONS        Your medication list        CONTINUE taking these medications        Instructions Last Dose Given Next Dose Due   ALPRAZolam 0.25 mg tablet  Commonly known as: Xanax           venlafaxine  mg 24 hr capsule  Commonly known as: Effexor-XR                    OUTPATIENT FOLLOW-UP     No future appointments.

## 2024-05-17 VITALS
OXYGEN SATURATION: 95 % | WEIGHT: 119.93 LBS | HEIGHT: 60 IN | BODY MASS INDEX: 23.55 KG/M2 | DIASTOLIC BLOOD PRESSURE: 84 MMHG | TEMPERATURE: 98.8 F | RESPIRATION RATE: 18 BRPM | HEART RATE: 102 BPM | SYSTOLIC BLOOD PRESSURE: 159 MMHG

## 2024-05-17 LAB
ANION GAP SERPL CALC-SCNC: 12 MMOL/L (ref 10–20)
BUN SERPL-MCNC: 45 MG/DL (ref 6–23)
CALCIUM SERPL-MCNC: 9.3 MG/DL (ref 8.6–10.3)
CHLORIDE SERPL-SCNC: 109 MMOL/L (ref 98–107)
CO2 SERPL-SCNC: 19 MMOL/L (ref 21–32)
CREAT SERPL-MCNC: 1.53 MG/DL (ref 0.5–1.05)
EGFRCR SERPLBLD CKD-EPI 2021: 33 ML/MIN/1.73M*2
ERYTHROCYTE [DISTWIDTH] IN BLOOD BY AUTOMATED COUNT: 14.3 % (ref 11.5–14.5)
GLUCOSE BLD MANUAL STRIP-MCNC: 131 MG/DL (ref 74–99)
GLUCOSE BLD MANUAL STRIP-MCNC: 93 MG/DL (ref 74–99)
GLUCOSE SERPL-MCNC: 90 MG/DL (ref 74–99)
HCT VFR BLD AUTO: 32.5 % (ref 36–46)
HGB BLD-MCNC: 10.4 G/DL (ref 12–16)
HOLD SPECIMEN: NORMAL
MAGNESIUM SERPL-MCNC: 2.23 MG/DL (ref 1.6–2.4)
MCH RBC QN AUTO: 31.8 PG (ref 26–34)
MCHC RBC AUTO-ENTMCNC: 32 G/DL (ref 32–36)
MCV RBC AUTO: 99 FL (ref 80–100)
NRBC BLD-RTO: 0 /100 WBCS (ref 0–0)
PLATELET # BLD AUTO: 244 X10*3/UL (ref 150–450)
POTASSIUM SERPL-SCNC: 3.9 MMOL/L (ref 3.5–5.3)
RBC # BLD AUTO: 3.27 X10*6/UL (ref 4–5.2)
SODIUM SERPL-SCNC: 136 MMOL/L (ref 136–145)
WBC # BLD AUTO: 13.9 X10*3/UL (ref 4.4–11.3)

## 2024-05-17 PROCEDURE — 80048 BASIC METABOLIC PNL TOTAL CA: CPT | Performed by: NURSE PRACTITIONER

## 2024-05-17 PROCEDURE — 97535 SELF CARE MNGMENT TRAINING: CPT | Mod: GO

## 2024-05-17 PROCEDURE — 83735 ASSAY OF MAGNESIUM: CPT | Performed by: NURSE PRACTITIONER

## 2024-05-17 PROCEDURE — 85027 COMPLETE CBC AUTOMATED: CPT | Performed by: NURSE PRACTITIONER

## 2024-05-17 PROCEDURE — 97530 THERAPEUTIC ACTIVITIES: CPT | Mod: GP,CQ

## 2024-05-17 PROCEDURE — 97110 THERAPEUTIC EXERCISES: CPT | Mod: GP,CQ

## 2024-05-17 PROCEDURE — 36415 COLL VENOUS BLD VENIPUNCTURE: CPT | Performed by: NURSE PRACTITIONER

## 2024-05-17 PROCEDURE — 2500000001 HC RX 250 WO HCPCS SELF ADMINISTERED DRUGS (ALT 637 FOR MEDICARE OP): Performed by: NURSE PRACTITIONER

## 2024-05-17 PROCEDURE — 2500000001 HC RX 250 WO HCPCS SELF ADMINISTERED DRUGS (ALT 637 FOR MEDICARE OP): Performed by: STUDENT IN AN ORGANIZED HEALTH CARE EDUCATION/TRAINING PROGRAM

## 2024-05-17 PROCEDURE — 82947 ASSAY GLUCOSE BLOOD QUANT: CPT

## 2024-05-17 PROCEDURE — 97530 THERAPEUTIC ACTIVITIES: CPT | Mod: GO

## 2024-05-17 PROCEDURE — 99231 SBSQ HOSP IP/OBS SF/LOW 25: CPT | Performed by: INTERNAL MEDICINE

## 2024-05-17 RX ADMIN — ACETAMINOPHEN 650 MG: 325 TABLET ORAL at 01:05

## 2024-05-17 RX ADMIN — ALPRAZOLAM 0.25 MG: 0.25 TABLET ORAL at 01:05

## 2024-05-17 RX ADMIN — OXYCODONE HYDROCHLORIDE 5 MG: 5 TABLET ORAL at 08:13

## 2024-05-17 RX ADMIN — PANTOPRAZOLE SODIUM 40 MG: 40 TABLET, DELAYED RELEASE ORAL at 06:15

## 2024-05-17 RX ADMIN — VENLAFAXINE HYDROCHLORIDE 150 MG: 150 CAPSULE, EXTENDED RELEASE ORAL at 08:13

## 2024-05-17 RX ADMIN — OXYCODONE HYDROCHLORIDE 5 MG: 5 TABLET ORAL at 14:23

## 2024-05-17 ASSESSMENT — COGNITIVE AND FUNCTIONAL STATUS - GENERAL
TURNING FROM BACK TO SIDE WHILE IN FLAT BAD: A LITTLE
EATING MEALS: A LITTLE
MOVING FROM LYING ON BACK TO SITTING ON SIDE OF FLAT BED WITH BEDRAILS: A LITTLE
DAILY ACTIVITIY SCORE: 13
CLIMB 3 TO 5 STEPS WITH RAILING: TOTAL
MOBILITY SCORE: 14
MOVING TO AND FROM BED TO CHAIR: A LITTLE
TOILETING: A LOT
WALKING IN HOSPITAL ROOM: A LOT
DRESSING REGULAR UPPER BODY CLOTHING: A LOT
HELP NEEDED FOR BATHING: A LOT
DRESSING REGULAR LOWER BODY CLOTHING: A LOT
STANDING UP FROM CHAIR USING ARMS: A LOT
PERSONAL GROOMING: A LOT

## 2024-05-17 ASSESSMENT — PAIN - FUNCTIONAL ASSESSMENT
PAIN_FUNCTIONAL_ASSESSMENT: 0-10
PAIN_FUNCTIONAL_ASSESSMENT: WONG-BAKER FACES
PAIN_FUNCTIONAL_ASSESSMENT: UNABLE TO SELF-REPORT
PAIN_FUNCTIONAL_ASSESSMENT: 0-10

## 2024-05-17 ASSESSMENT — PAIN DESCRIPTION - LOCATION
LOCATION: RIB CAGE
LOCATION: RIB CAGE

## 2024-05-17 ASSESSMENT — PAIN SCALES - GENERAL
PAINLEVEL_OUTOF10: 7
PAINLEVEL_OUTOF10: 7

## 2024-05-17 ASSESSMENT — PAIN DESCRIPTION - ORIENTATION: ORIENTATION: LEFT

## 2024-05-17 ASSESSMENT — ACTIVITIES OF DAILY LIVING (ADL): HOME_MANAGEMENT_TIME_ENTRY: 10

## 2024-05-17 ASSESSMENT — PAIN SCALES - WONG BAKER: WONGBAKER_NUMERICALRESPONSE: NO HURT

## 2024-05-17 NOTE — PROGRESS NOTES
Merna Jaffe is a 88 y.o. female on day 7 of admission presenting with Pneumonia of both lungs due to infectious organism, unspecified part of lung.    Subjective   NAEON. No complaints.     Objective     Physical Exam  Constitutional:       Appearance: She is not ill-appearing.   HENT:      Head: Normocephalic and atraumatic.   Eyes:      Extraocular Movements: Extraocular movements intact.   Cardiovascular:      Rate and Rhythm: Normal rate and regular rhythm.   Pulmonary:      Breath sounds: No rales.      Comments: Respiratory effort seems better,  Abdominal:      Palpations: Abdomen is soft.   Neurological:      Mental Status: She is alert. Mental status is at baseline.         Last Recorded Vitals  Blood pressure 141/80, pulse 73, temperature 36.3 °C (97.3 °F), temperature source Temporal, resp. rate 20, height 1.524 m (5'), weight 54.4 kg (119 lb 14.9 oz), SpO2 92%.  Intake/Output last 3 Shifts:  I/O last 3 completed shifts:  In: - (0 mL/kg)   Out: 800 (14.7 mL/kg) [Urine:800 (0.4 mL/kg/hr)]  Weight: 54.4 kg     Relevant Results                Assessment/Plan   Principal Problem:    Pneumonia of both lungs due to infectious organism, unspecified part of lung    88-year-old female was transferred out of the ICU, she was initially admitted for anxiety/depression, acute hypoxemic respiratory failure, multifocal pneumonia, acute pulmonary edema, SANJANA    Patient was transferred out of the ICU yesterday  Currently on room air during the  On ceftriaxone for pneumonia  Currently does not seem to be on oxygen  Echocardiogram reviewed  Did have a component of pulmonary edema making breathing worse  Right now she seems to be in fluid balance  DuoNebs, O2 support as needed will involve  for discharge planning she does need help  Continue home meds for anxiety/depression  Creatinine went up slightly with diuresis, of diuretics, continue to monitor creatinine   continue insulin sliding scale  Strep pneumo,  Legionella, MRSA all came back negative, blood cultures have been negative as well  She continues to have persistent white cell count, does not seem like she is on steroids, procalcitonin was also elevated  If it persistently goes up will need further workup along with changing antibiotics  DVT prophylaxis    5/13  -multifocal pna, appears to be improving, wbc downtrending 15.9k today, no fevers, hds, remains on room air  -cont empiric rocephin, duonebs  -sanjana on ckd3, scr stable today, cont to hold diuretics, she appears euvolemic, avoid nephrotoxins  -pt/ot saw, rec moderate, tcc to speak with patient. She lives alone.   -vte ppx lovenox    5/14  -multifocal PNA, overall stable, but did have small tmax 100.6f overnight, and wbc slight bump to 17.3k today. She was on room air for past 2 days but put on 2L O2 at some point overnight, will trial RA again and see how she does. Cont empiric Rocephin. Cont TID duonebs. Will keep another 24 hours.   -SANJANA on CKD3, scr down trending today to 1.97, cont holding diuretics, avoid nephrotoxins  -hypokalemia 3.3, replete today  -dispo tbd, possibly snf if pt agrees, if not likely home with [facility]  -vte ppx lovenox    5/15  -improving, afebrile x24h, wbc down trend, remains on room air  -finish x7d rocephin (eot 5/16), will switch to PO agent if able to be discharged today  -sanjana on ckd3 continues to improve, scr 1.72 today  -snf dispo, pending acceptance and precert  -medically she is ready for discharge    5/17  -completed x7d abx course for pna  -afebrile, hds, leukocytosis down trending, remains on room air  -scr downtrending  -stable for dc to snf  -see dc summary from 5/16      Hansel Ivy MD

## 2024-05-17 NOTE — CARE PLAN
The patient's goals for the shift include no pain    The clinical goals for the shift include no sob fotr this shift    Over the shift, the patient did not make progress toward the following goals. Barriers to progression include . Recommendations to address these barriers include .

## 2024-05-17 NOTE — PROGRESS NOTES
Physical Therapy    Physical Therapy Treatment    Patient Name: Merna Jaffe  MRN: [id number]  Today's Date: 5/17/2024  Time Calculation  Start Time: 1014  Stop Time: 1044  Time Calculation (min): 30 min    Assessment/Plan   PT Assessment  PT Assessment Results: Decreased strength, Decreased endurance, Impaired balance, Decreased mobility, Decreased safety awareness  Rehab Prognosis: Good  End of Session Communication: Bedside nurse  Assessment Comment: Pt with min>mod A this session with some retropulsive tendancies. Pt needing some encouragement but agreeable to sit in straight back chair but refusing recliner. Pt will benefit from skilled PT to further progress strength and functional mobility.  End of Session Patient Position: Up in chair  PT Plan  Inpatient/Swing Bed or Outpatient: Inpatient  PT Plan  Treatment/Interventions: Bed mobility, Transfer training, Gait training, Therapeutic exercise  PT Plan: Skilled PT  PT Frequency: 4 times per week  PT Discharge Recommendations: Moderate intensity level of continued care  PT Recommended Transfer Status: Assist x1, Assistive device    General Visit Information:   PT  Visit  PT Received On: 05/17/24  General  Reason for Referral: impaired mobility  Referred By: PT/OT 5/11 Antionette  Past Medical History Relevant to Rehab: HTN, depression, anxiety  Prior to Session Communication: Bedside nurse  Patient Position Received: Bed, 3 rail up  General Comment: Pt agreeable to PT treatment.    Subjective   Precautions:  Precautions  Medical Precautions: Fall precautions, Oxygen therapy device and L/min (Pt on RA this session.)  Vital Signs:       Objective   Pain:  Pain Assessment  Pain Assessment: 0-10  Pain Score:  (Does not quantify)  Pain Type: Acute pain  Pain Location: Rib cage  Pain Orientation: Left  Cognition:  Cognition  Orientation Level: Oriented X4  Coordination:     Postural Control:     Extremity/Trunk Assessments:        Activity Tolerance:  Activity  Tolerance  Endurance: Decreased tolerance for upright activites  Treatments:  Therapeutic Exercise  Therapeutic Exercise Performed: Yes (Seated AP, LAQ and marches x 10ea BLE. Increased verbal/tactile cues for technique with ther ex. Easily distracted needing re-direction.)    Bed Mobility  Bed Mobility: Yes (Sup>sit with min A and hand over hand cues to reach for bed rail. Verbal cues to bring hips fwd to get feet onto the floor.)    Ambulation/Gait Training  Ambulation/Gait Training Performed: Yes (Pt ambulating 10' from bed to chair with WW and mod A. Pt mildly unsteady with retropulsive tendancies.)  Transfers  Transfer: Yes (Multiple sit<>stands from EOB and chair with WW and mod A. Mild retropulsive LOB with mod A to correct.)    Outcome Measures:  [facility] Basic Mobility  Turning from your back to your side while in a flat bed without using bedrails: A little  Moving from lying on your back to sitting on the side of a flat bed without using bedrails: A little  Moving to and from bed to chair (including a wheelchair): A little  Standing up from a chair using your arms (e.g. wheelchair or bedside chair): A lot  To walk in hospital room: A lot  Climbing 3-5 steps with railing: Total  Basic Mobility - Total Score: 14    Education Documentation  Mobility Training, taught by Marya Campos PTA at 5/17/2024 11:52 AM.  Learner: Patient  Readiness: Acceptance  Method: Explanation  Response: Needs Reinforcement    Education Comments  No comments found.         EDUCATION:  Outpatient Education  Individual(s) Educated: Patient  Education Provided: Body Mechanics, Fall Risk, POC, Home Exercise Program    Encounter Problems       Encounter Problems (Active)       PT Problem       Pt will demonstrate sup > sit and sit > sup bed mobility mod I (Progressing)       Start:  05/12/24    Expected End:  05/26/24            Pt will demo sit > stand and stand > sit transfer with ww and mod I  (Progressing)       Start:  05/12/24     Expected End:  05/26/24            Pt will ambulate 25' with ww and mod I, without LOB  (Progressing)       Start:  05/12/24    Expected End:  05/26/24            Pt will demonstrate ability to tolerate 8 minutes of seated or standing therapeutic exercise with 4 or less rest breaks to demonstrate improved activity tolerance.  (Progressing)       Start:  05/12/24    Expected End:  05/26/24

## 2024-05-17 NOTE — PROGRESS NOTES
Occupational Therapy    Occupational Therapy Treatment    Name: Merna Jaffe  MRN: [id number]  : 1935  Date: 24  Time Calculation  Start Time: 1123  Stop Time: 1148  Time Calculation (min): 25 min    Assessment:  OT Assessment: OT notified RN of pt. Lethargy, L lower flank pain, as well as significant decline in functional mobility and self care abilities compared to last OT session.  End of Session Communication: Bedside nurse  End of Session Patient Position: Bed, 3 rail up, Alarm on (all needs in reach)  Plan:  Treatment Interventions: ADL retraining, Functional transfer training, UE strengthening/ROM, Endurance training, Patient/family training  OT Frequency: 2 times per week  OT Discharge Recommendations: Moderate intensity level of continued care  Equipment Recommended upon Discharge:  (Pt owns FWW, cane, w/c, hospital bed)  OT - OK to Discharge: Yes (ok to d/c to next level of care once medically cleared)    Subjective   Previous Visit Info:     General:  General  Prior to Session Communication: Bedside nurse  Patient Position Received: Up in chair, Alarm on (PCA present)  General Comment: Pt. reported feeling tired.  Agreeable to therapy, but needed continual cues to open eyes and engage.  OT notified RN of pt. lethargy.  Per RN, pt. had oxycodone ~ 3 hours prior to this session, which may be contributing to her lethargy.  Precautions:  Medical Precautions: Fall precautions (on room air)  Vitals:  Vital Signs  Heart Rate: 103  SpO2:  (92-97% on room air)  Pain Assessment:  Pain Assessment  Pain Assessment: 0-10  Pain Score:  (7-8/10)  Pain Type: Acute pain (Pt. reported that this pain began this morning, and that she notified RN.)  Pain Location:  (L lower flank)       Activities of Daily Living: LE Dressing  Pants Level of Assistance: Maximum assistance (Max assist to elevate trunk from back of chair x 5 times, as pt. kept leaning back. Max assist to thread underwear and pull up over hips, and  mod/max assist to maintain standing, as pt. attempted to sit prior to underwear being up.  FWW in front of pt.)       Bed Mobility/Transfers: Bed Mobility 1  Bed Mobility Comments 1: Sit to sup, min assist, as pt. sat too early, and very near EOB.  Bridge and scoot to center of bed, max verbal and min tactile cues.    Transfers:  Transfer:  (Mod assist sit to stand from high back chair with arms with FWW.  Max assist stand to sit from FWW to EOB, as pt. attempted to sit prior to arriving fully at EOB, and OT needed to give max verbal cues and to assist pt. to land safely.)      Functional Mobility:  Functional Mobility  Functional Mobility Performed:  (Chair to bed with FWW, ~ 5', mod assist for balance and remaining upright with LEs and trunk extended.)    Outcome Measures:  [facility] Daily Activity  Putting on and taking off regular lower body clothing: A lot  Bathing (including washing, rinsing, drying): A lot  Putting on and taking off regular upper body clothing: A lot  Toileting, which includes using toilet, bedpan or urinal: A lot  Taking care of personal grooming such as brushing teeth: A lot  Eating Meals: A little  Daily Activity - Total Score: 13        Education Documentation  Body Mechanics, taught by Lady Suarez OT at 5/17/2024  1:02 PM.  Learner: Patient  Readiness: Acceptance  Method: Explanation  Response: Verbalizes Understanding    ADL Training, taught by Lady Suarez OT at 5/17/2024  1:02 PM.  Learner: Patient  Readiness: Acceptance  Method: Explanation  Response: Verbalizes Understanding    Education Comments  No comments found.      Goals:  Encounter Problems       Encounter Problems (Active)       OT Goals       Pt will complete LB dressing with MOD I. (Progressing)       Start:  05/12/24    Expected End:  05/26/24            Pt will complete all functional transfers with MOD I. (Progressing)       Start:  05/12/24    Expected End:  05/26/24            Pt will complete all  functional mobility with MOD I. (Progressing)       Start:  05/12/24    Expected End:  05/26/24            Pt will tolerate 10 mins of functional activities with 2 or fewer rest breaks. (Progressing)       Start:  05/12/24    Expected End:  05/26/24

## 2024-05-17 NOTE — PROGRESS NOTES
05/17/24 1216   Current Planned Discharge Disposition   Current Planned Discharge Disposition SNF  ([facility])     Insurance auth received and pt medically cleared for discharge today. Facility notified and confirms they are able to accommodate pt today. Transport requested via roundtrip and confirms 1:30pm pickup. Care team and facility notified. SW notified pt, pt in agreement with discharge plan. SW inquired about additional family contact information due to inability to reach son Michael. Pt unable to recall cell phone number for Michael but provided SW with phone number for her niece Valerie. SW contacted Valerie and provided update. Valerie provided SW with additional contact information for sons Michael and Jerome. SW able to reach Michael on cell phone number provided by Michael Padilla in agreement with discharge plan. SW updated pt. Pt gave permission for Valerie and Jerome to be added to her contacts. Contact information updated in system and facility notified as well.

## 2024-05-20 ENCOUNTER — NURSING HOME VISIT (OUTPATIENT)
Dept: POST ACUTE CARE | Facility: EXTERNAL LOCATION | Age: 89
End: 2024-05-20
Payer: MEDICARE

## 2024-05-20 DIAGNOSIS — N17.9 ACUTE RENAL FAILURE, UNSPECIFIED ACUTE RENAL FAILURE TYPE (CMS-HCC): ICD-10-CM

## 2024-05-20 DIAGNOSIS — D64.9 ANEMIA, UNSPECIFIED TYPE: ICD-10-CM

## 2024-05-20 DIAGNOSIS — J18.9 PNEUMONIA OF BOTH LUNGS DUE TO INFECTIOUS ORGANISM, UNSPECIFIED PART OF LUNG: Primary | ICD-10-CM

## 2024-05-20 DIAGNOSIS — N18.4 CKD (CHRONIC KIDNEY DISEASE) STAGE 4, GFR 15-29 ML/MIN (MULTI): ICD-10-CM

## 2024-05-20 DIAGNOSIS — F41.9 ANXIETY: ICD-10-CM

## 2024-05-20 DIAGNOSIS — D72.829 LEUKOCYTOSIS, UNSPECIFIED TYPE: ICD-10-CM

## 2024-05-20 DIAGNOSIS — I10 HYPERTENSION, UNSPECIFIED TYPE: ICD-10-CM

## 2024-05-20 PROBLEM — L30.8 PRURITIC DERMATITIS: Status: RESOLVED | Noted: 2021-08-23 | Resolved: 2024-05-20

## 2024-05-20 PROBLEM — L23.7 POISON OAK DERMATITIS: Status: RESOLVED | Noted: 2021-08-23 | Resolved: 2024-05-20

## 2024-05-20 PROBLEM — M25.519 SHOULDER PAIN: Status: RESOLVED | Noted: 2024-05-20 | Resolved: 2024-05-20

## 2024-05-20 PROBLEM — M75.80 ROTATOR CUFF TENDINITIS: Status: RESOLVED | Noted: 2024-05-20 | Resolved: 2024-05-20

## 2024-05-20 PROCEDURE — 99309 SBSQ NF CARE MODERATE MDM 30: CPT

## 2024-05-20 RX ORDER — ALPRAZOLAM 0.25 MG/1
0.25 TABLET ORAL NIGHTLY PRN
Qty: 14 TABLET | Refills: 0 | Status: SHIPPED | OUTPATIENT
Start: 2024-05-20 | End: 2024-06-03

## 2024-05-20 NOTE — ASSESSMENT & PLAN NOTE
Lab Results   Component Value Date    WBC 13.9 (H) 05/17/2024    HGB 10.4 (L) 05/17/2024    HCT 32.5 (L) 05/17/2024    MCV 99 05/17/2024     05/17/2024     Hemoglobin is stable at 10.4.

## 2024-05-20 NOTE — ASSESSMENT & PLAN NOTE
Lab Results   Component Value Date    WBC 13.9 (H) 05/17/2024    HGB 10.4 (L) 05/17/2024    HCT 32.5 (L) 05/17/2024    MCV 99 05/17/2024     05/17/2024     This is trending down; will recheck once for continued downtrend.

## 2024-05-20 NOTE — LETTER
Patient: Merna Jaffe  : 1935    Encounter Date: 2024    Visit  Note   Subjective  Merna Jaffe is a 88 y.o. female who is being seen and evaluated for multiple medical problems. Nursing notes, vital signs, and labs were reviewed in the local facility chart.    This is an 88-year-old female who was evaluated in the emergency room on May 10, 2024 for acute respiratory failure secondary to bilateral multifocal pneumonia.  Her stay was complicated by acute kidney injury on chronic kidney disease and agitation worsening respiratory status. She was placed on BiPAP and admitted to the intensive care unit where she received IV Rocephin and IV Lasix with improvement.  SANJANA resolved and renal function returned to baseline. She completed her 7-day course of antibiotics for pneumonia during her hospital stay, weaned off oxygen, and her leukocytosis trended down as well.    She was discharged to [facility] for her recovery and rehabilitation after hospital stay.    Patient has a past medical history of anemia, hypertension, anxiety, chronic insomnia.       Objective  There were no vitals taken for this visit.  Physical Exam  Vitals reviewed.   Constitutional:       Appearance: Normal appearance.   HENT:      Head: Normocephalic.   Cardiovascular:      Rate and Rhythm: Normal rate and regular rhythm.   Pulmonary:      Effort: Pulmonary effort is normal. No respiratory distress.      Breath sounds: Normal breath sounds. No wheezing, rhonchi or rales.   Musculoskeletal:      Cervical back: Neck supple.   Skin:     General: Skin is warm and dry.   Neurological:      Mental Status: She is alert.   Psychiatric:         Mood and Affect: Mood normal.         Behavior: Behavior normal.       Assessment/Plan  Problem List Items Addressed This Visit       Pneumonia due to infectious organism - Primary     Completed 7 day course of antibiotic treatment while hospitalized and weaned off oxygen supplementation.  Lungs are CTA on examination today and patient is asymptomatic. Will continue supportive PRN nebulizer medications and rehabilitation with PT/OT while she is here.          Acute renal failure (CMS-HCC)     Resolved; SANJANA on CKD this has returned to baseline.          Anemia     Lab Results   Component Value Date    WBC 13.9 (H) 05/17/2024    HGB 10.4 (L) 05/17/2024    HCT 32.5 (L) 05/17/2024    MCV 99 05/17/2024     05/17/2024     Hemoglobin is stable at 10.4.          CKD (chronic kidney disease) stage 4, GFR 15-29 ml/min (Multi)     Lab Results   Component Value Date    GLUCOSE 90 05/17/2024    CALCIUM 9.3 05/17/2024     05/17/2024    K 3.9 05/17/2024    CO2 19 (L) 05/17/2024     (H) 05/17/2024    BUN 45 (H) 05/17/2024    CREATININE 1.53 (H) 05/17/2024     This appears to be her baseline kidney function. We will continue to avoid nephrotoxic agents and monitor periodically. Recheck BMP once to assure stability.          Hypertension     Mostly controlled while she is here; She is not currently on antihypertensives; most recent /68         Leukocytosis     Lab Results   Component Value Date    WBC 13.9 (H) 05/17/2024    HGB 10.4 (L) 05/17/2024    HCT 32.5 (L) 05/17/2024    MCV 99 05/17/2024     05/17/2024     This is trending down; will recheck once for continued downtrend.           Other Visit Diagnoses       Anxiety        Relevant Medications    ALPRAZolam (Xanax) 0.25 mg tablet               Please excuse any errors in grammar or translation related to this dictation. Voice recognition software was utilized to prepare this document.       Electronically Signed By: MISTY Mcconnell   5/20/24  4:06 PM

## 2024-05-20 NOTE — ASSESSMENT & PLAN NOTE
Lab Results   Component Value Date    GLUCOSE 90 05/17/2024    CALCIUM 9.3 05/17/2024     05/17/2024    K 3.9 05/17/2024    CO2 19 (L) 05/17/2024     (H) 05/17/2024    BUN 45 (H) 05/17/2024    CREATININE 1.53 (H) 05/17/2024     This appears to be her baseline kidney function. We will continue to avoid nephrotoxic agents and monitor periodically. Recheck BMP once to assure stability.

## 2024-05-20 NOTE — ASSESSMENT & PLAN NOTE
Completed 7 day course of antibiotic treatment while hospitalized and weaned off oxygen supplementation. Lungs are CTA on examination today and patient is asymptomatic. Will continue supportive PRN nebulizer medications and rehabilitation with PT/OT while she is here.

## 2024-05-20 NOTE — PROGRESS NOTES
Visit  Note   Subjective   Merna Jaffe is a 88 y.o. female who is being seen and evaluated for multiple medical problems. Nursing notes, vital signs, and labs were reviewed in the local facility chart.    This is an 88-year-old female who was evaluated in the emergency room on May 10, 2024 for acute respiratory failure secondary to bilateral multifocal pneumonia.  Her stay was complicated by acute kidney injury on chronic kidney disease and agitation worsening respiratory status. She was placed on BiPAP and admitted to the intensive care unit where she received IV Rocephin and IV Lasix with improvement.  SANJANA resolved and renal function returned to baseline. She completed her 7-day course of antibiotics for pneumonia during her hospital stay, weaned off oxygen, and her leukocytosis trended down as well.    She was discharged to [facility] for her recovery and rehabilitation after hospital stay.    Patient has a past medical history of anemia, hypertension, anxiety, chronic insomnia.       Objective   There were no vitals taken for this visit.  Physical Exam  Vitals reviewed.   Constitutional:       Appearance: Normal appearance.   HENT:      Head: Normocephalic.   Cardiovascular:      Rate and Rhythm: Normal rate and regular rhythm.   Pulmonary:      Effort: Pulmonary effort is normal. No respiratory distress.      Breath sounds: Normal breath sounds. No wheezing, rhonchi or rales.   Musculoskeletal:      Cervical back: Neck supple.   Skin:     General: Skin is warm and dry.   Neurological:      Mental Status: She is alert.   Psychiatric:         Mood and Affect: Mood normal.         Behavior: Behavior normal.       Assessment/Plan   Problem List Items Addressed This Visit       Pneumonia due to infectious organism - Primary     Completed 7 day course of antibiotic treatment while hospitalized and weaned off oxygen supplementation. Lungs are CTA on examination today and patient is asymptomatic. Will  continue supportive PRN nebulizer medications and rehabilitation with PT/OT while she is here.          Acute renal failure (CMS-HCC)     Resolved; SANJANA on CKD this has returned to baseline.          Anemia     Lab Results   Component Value Date    WBC 13.9 (H) 05/17/2024    HGB 10.4 (L) 05/17/2024    HCT 32.5 (L) 05/17/2024    MCV 99 05/17/2024     05/17/2024     Hemoglobin is stable at 10.4.          CKD (chronic kidney disease) stage 4, GFR 15-29 ml/min (Multi)     Lab Results   Component Value Date    GLUCOSE 90 05/17/2024    CALCIUM 9.3 05/17/2024     05/17/2024    K 3.9 05/17/2024    CO2 19 (L) 05/17/2024     (H) 05/17/2024    BUN 45 (H) 05/17/2024    CREATININE 1.53 (H) 05/17/2024     This appears to be her baseline kidney function. We will continue to avoid nephrotoxic agents and monitor periodically. Recheck BMP once to assure stability.          Hypertension     Mostly controlled while she is here; She is not currently on antihypertensives; most recent /68         Leukocytosis     Lab Results   Component Value Date    WBC 13.9 (H) 05/17/2024    HGB 10.4 (L) 05/17/2024    HCT 32.5 (L) 05/17/2024    MCV 99 05/17/2024     05/17/2024     This is trending down; will recheck once for continued downtrend.           Other Visit Diagnoses       Anxiety        Relevant Medications    ALPRAZolam (Xanax) 0.25 mg tablet               Please excuse any errors in grammar or translation related to this dictation. Voice recognition software was utilized to prepare this document.

## 2024-05-22 ENCOUNTER — NURSING HOME VISIT (OUTPATIENT)
Dept: POST ACUTE CARE | Facility: EXTERNAL LOCATION | Age: 89
End: 2024-05-22
Payer: MEDICARE

## 2024-05-22 DIAGNOSIS — N18.4 CKD (CHRONIC KIDNEY DISEASE) STAGE 4, GFR 15-29 ML/MIN (MULTI): ICD-10-CM

## 2024-05-22 DIAGNOSIS — N17.9 ACUTE RENAL FAILURE, UNSPECIFIED ACUTE RENAL FAILURE TYPE (CMS-HCC): Primary | ICD-10-CM

## 2024-05-22 DIAGNOSIS — J18.9 PNEUMONIA OF BOTH LOWER LOBES DUE TO INFECTIOUS ORGANISM: ICD-10-CM

## 2024-05-22 DIAGNOSIS — D64.9 ANEMIA, UNSPECIFIED TYPE: ICD-10-CM

## 2024-05-22 PROCEDURE — 99305 1ST NF CARE MODERATE MDM 35: CPT | Performed by: FAMILY MEDICINE

## 2024-05-22 NOTE — ASSESSMENT & PLAN NOTE
She completed a 7 day course of antibiotic treatment while hospitalized and weaned off oxygen supplementation. She is asymptomatic currently. Will continue supportive PRN nebulizer medications and rehabilitation with PT/OT.

## 2024-05-22 NOTE — PROGRESS NOTES
Admission H&P  Subjective   Merna Jaffe is a 88 y.o. female who is being seen for an admission H&P.  Nursing notes, vital signs, and labs were reviewed in the local facility chart and she  is being evaluated for multiple medical problems.     HPI   She was admitted to the hospital on May 10 for acute respiratory failure secondary to bilateral pneumonia.  Her stay was complicated by acute on chronic kidney failure and agitation.  She was placed on BiPAP and admitted to the intensive care unit where she received Rocephin and Lasix with improvement.  The acute kidney injury improved and renal function returned to baseline.  She completed a 7-day course of antibiotics during the hospital stay and was weaned off oxygen.  She comes to this facility for recovery, close monitoring, and PT/OT  Objective   There were no vitals taken for this visit.  Physical Exam  Constitutional:       Appearance: Normal appearance.   HENT:      Head: Normocephalic.   Eyes:      Conjunctiva/sclera: Conjunctivae normal.   Cardiovascular:      Rate and Rhythm: Normal rate and regular rhythm.      Heart sounds: Normal heart sounds.   Pulmonary:      Effort: Pulmonary effort is normal.      Breath sounds: Normal breath sounds.   Musculoskeletal:      Cervical back: Neck supple.   Skin:     General: Skin is warm and dry.   Neurological:      Mental Status: She is alert.       Assessment/Plan   Problem List Items Addressed This Visit       Acute renal failure (CMS-HCC) - Primary     Resolved; SANJANA on CKD this has returned to baseline.          Anemia     Lab Results   Component Value Date    WBC 13.9 (H) 05/17/2024    HGB 10.4 (L) 05/17/2024    HCT 32.5 (L) 05/17/2024    MCV 99 05/17/2024     05/17/2024     Hemoglobin is stable at 10.4.          CKD (chronic kidney disease) stage 4, GFR 15-29 ml/min (Multi)     Lab Results   Component Value Date    GLUCOSE 90 05/17/2024    CALCIUM 9.3 05/17/2024     05/17/2024    K 3.9 05/17/2024     CO2 19 (L) 05/17/2024     (H) 05/17/2024    BUN 45 (H) 05/17/2024    CREATININE 1.53 (H) 05/17/2024     This appears to be her baseline kidney function. We will continue to avoid nephrotoxic agents and monitor periodically. Recheck BMP once to assure stability.          Pneumonia of both lower lobes due to infectious organism     She completed a 7 day course of antibiotic treatment while hospitalized and weaned off oxygen supplementation. She is asymptomatic currently. Will continue supportive PRN nebulizer medications and rehabilitation with PT/OT.                Please excuse any errors in grammar or translation related to this dictation. Voice recognition software was utilized to prepare this document.

## 2024-05-22 NOTE — LETTER
Patient: Merna Jaffe  : 1935    Encounter Date: 2024    Admission H&P  Subjective  Merna Jaffe is a 88 y.o. female who is being seen for an admission H&P.  Nursing notes, vital signs, and labs were reviewed in the local facility chart and she  is being evaluated for multiple medical problems.     HPI   She was admitted to the hospital on May 10 for acute respiratory failure secondary to bilateral pneumonia.  Her stay was complicated by acute on chronic kidney failure and agitation.  She was placed on BiPAP and admitted to the intensive care unit where she received Rocephin and Lasix with improvement.  The acute kidney injury improved and renal function returned to baseline.  She completed a 7-day course of antibiotics during the hospital stay and was weaned off oxygen.  She comes to this facility for recovery, close monitoring, and PT/OT  Objective  There were no vitals taken for this visit.  Physical Exam  Constitutional:       Appearance: Normal appearance.   HENT:      Head: Normocephalic.   Eyes:      Conjunctiva/sclera: Conjunctivae normal.   Cardiovascular:      Rate and Rhythm: Normal rate and regular rhythm.      Heart sounds: Normal heart sounds.   Pulmonary:      Effort: Pulmonary effort is normal.      Breath sounds: Normal breath sounds.   Musculoskeletal:      Cervical back: Neck supple.   Skin:     General: Skin is warm and dry.   Neurological:      Mental Status: She is alert.       Assessment/Plan  Problem List Items Addressed This Visit       Acute renal failure (CMS-HCC) - Primary     Resolved; SANJANA on CKD this has returned to baseline.          Anemia     Lab Results   Component Value Date    WBC 13.9 (H) 2024    HGB 10.4 (L) 2024    HCT 32.5 (L) 2024    MCV 99 2024     2024     Hemoglobin is stable at 10.4.          CKD (chronic kidney disease) stage 4, GFR 15-29 ml/min (Multi)     Lab Results   Component Value Date    GLUCOSE 90 2024     CALCIUM 9.3 05/17/2024     05/17/2024    K 3.9 05/17/2024    CO2 19 (L) 05/17/2024     (H) 05/17/2024    BUN 45 (H) 05/17/2024    CREATININE 1.53 (H) 05/17/2024     This appears to be her baseline kidney function. We will continue to avoid nephrotoxic agents and monitor periodically. Recheck BMP once to assure stability.          Pneumonia of both lower lobes due to infectious organism     She completed a 7 day course of antibiotic treatment while hospitalized and weaned off oxygen supplementation. She is asymptomatic currently. Will continue supportive PRN nebulizer medications and rehabilitation with PT/OT.                Please excuse any errors in grammar or translation related to this dictation. Voice recognition software was utilized to prepare this document.       Electronically Signed By: Mk Sheppard MD   5/22/24 12:05 PM

## 2024-05-24 ENCOUNTER — NURSING HOME VISIT (OUTPATIENT)
Dept: POST ACUTE CARE | Facility: EXTERNAL LOCATION | Age: 89
End: 2024-05-24
Payer: MEDICARE

## 2024-05-24 DIAGNOSIS — D64.9 ANEMIA, UNSPECIFIED TYPE: ICD-10-CM

## 2024-05-24 DIAGNOSIS — I10 HYPERTENSION, UNSPECIFIED TYPE: ICD-10-CM

## 2024-05-24 DIAGNOSIS — J18.9 PNEUMONIA OF BOTH LOWER LOBES DUE TO INFECTIOUS ORGANISM: Primary | ICD-10-CM

## 2024-05-24 DIAGNOSIS — N17.9 ACUTE RENAL FAILURE, UNSPECIFIED ACUTE RENAL FAILURE TYPE (CMS-HCC): ICD-10-CM

## 2024-05-24 DIAGNOSIS — R10.13 EPIGASTRIC PAIN: ICD-10-CM

## 2024-05-24 PROCEDURE — 99309 SBSQ NF CARE MODERATE MDM 30: CPT

## 2024-05-24 NOTE — LETTER
"Patient: Merna Jaffe  : 1935    Encounter Date: 2024    Visit  Note   Subjective  Merna Jaffe is a 88 y.o. female who is being seen and evaluated for multiple medical problems. Nursing notes, vital signs, and labs were reviewed in the local facility chart.    Patient evaluated today for complaints of \"upset stomach\". She is lying in her bed and not as talkative today. She is not answering many questions. Discussed the case with nursing staff.        Objective  There were no vitals taken for this visit.  Physical Exam  Vitals reviewed.   Constitutional:       Appearance: Normal appearance.   HENT:      Head: Normocephalic.   Cardiovascular:      Rate and Rhythm: Normal rate and regular rhythm.   Pulmonary:      Effort: Pulmonary effort is normal. No respiratory distress.      Breath sounds: Normal breath sounds. No wheezing, rhonchi or rales.   Abdominal:      General: Bowel sounds are normal. There is no distension.   Musculoskeletal:      Cervical back: Neck supple.   Skin:     General: Skin is warm and dry.   Neurological:      Mental Status: She is alert.       Assessment/Plan  Added Tums PRN for epigastric discomfort; reviewed with nursing staff no other concerns today.  Problem List Items Addressed This Visit       Pneumonia of both lower lobes due to infectious organism - Primary     She completed a 7 day course of antibiotic treatment while hospitalized and weaned off oxygen supplementation. She is asymptomatic currently. Will continue supportive PRN nebulizer medications and rehabilitation with PT/OT.          RESOLVED: Acute renal failure (CMS-HCC)    Anemia     Lab Results   Component Value Date    WBC 13.9 (H) 2024    HGB 10.4 (L) 2024    HCT 32.5 (L) 2024    MCV 99 2024     2024     Hemoglobin is stable at 10.4; no obvious s/s of bleeding; no repeat labs necessary at this time will monitor periodically          Hypertension     Mostly controlled " while she is here; She is not currently on antihypertensives; most recent /74          Other Visit Diagnoses       Epigastric pain                   Please excuse any errors in grammar or translation related to this dictation. Voice recognition software was utilized to prepare this document.       Electronically Signed By: MISTY Mcconnell   5/25/24  9:19 AM

## 2024-05-24 NOTE — PROGRESS NOTES
"Visit  Note   Subjective   Merna Jaffe is a 88 y.o. female who is being seen and evaluated for multiple medical problems. Nursing notes, vital signs, and labs were reviewed in the local facility chart.    Patient evaluated today for complaints of \"upset stomach\". She is lying in her bed and not as talkative today. She is not answering many questions. Discussed the case with nursing staff.        Objective   There were no vitals taken for this visit.  Physical Exam  Vitals reviewed.   Constitutional:       Appearance: Normal appearance.   HENT:      Head: Normocephalic.   Cardiovascular:      Rate and Rhythm: Normal rate and regular rhythm.   Pulmonary:      Effort: Pulmonary effort is normal. No respiratory distress.      Breath sounds: Normal breath sounds. No wheezing, rhonchi or rales.   Abdominal:      General: Bowel sounds are normal. There is no distension.   Musculoskeletal:      Cervical back: Neck supple.   Skin:     General: Skin is warm and dry.   Neurological:      Mental Status: She is alert.       Assessment/Plan   Added Tums PRN for epigastric discomfort; reviewed with nursing staff no other concerns today.  Problem List Items Addressed This Visit       Pneumonia of both lower lobes due to infectious organism - Primary     She completed a 7 day course of antibiotic treatment while hospitalized and weaned off oxygen supplementation. She is asymptomatic currently. Will continue supportive PRN nebulizer medications and rehabilitation with PT/OT.          RESOLVED: Acute renal failure (CMS-HCC)    Anemia     Lab Results   Component Value Date    WBC 13.9 (H) 05/17/2024    HGB 10.4 (L) 05/17/2024    HCT 32.5 (L) 05/17/2024    MCV 99 05/17/2024     05/17/2024     Hemoglobin is stable at 10.4; no obvious s/s of bleeding; no repeat labs necessary at this time will monitor periodically          Hypertension     Mostly controlled while she is here; She is not currently on antihypertensives; most recent " /74          Other Visit Diagnoses       Epigastric pain                   Please excuse any errors in grammar or translation related to this dictation. Voice recognition software was utilized to prepare this document.

## 2024-05-25 PROBLEM — N17.9 ACUTE RENAL FAILURE (CMS-HCC): Status: RESOLVED | Noted: 2024-05-20 | Resolved: 2024-05-25

## 2024-05-25 NOTE — ASSESSMENT & PLAN NOTE
Lab Results   Component Value Date    WBC 13.9 (H) 05/17/2024    HGB 10.4 (L) 05/17/2024    HCT 32.5 (L) 05/17/2024    MCV 99 05/17/2024     05/17/2024     Hemoglobin is stable at 10.4; no obvious s/s of bleeding; no repeat labs necessary at this time will monitor periodically

## 2024-06-04 RX ORDER — VENLAFAXINE HYDROCHLORIDE 150 MG/1
CAPSULE, EXTENDED RELEASE ORAL
Qty: 90 CAPSULE | Refills: 0 | Status: SHIPPED | OUTPATIENT
Start: 2024-06-04

## 2024-06-12 ENCOUNTER — HOSPITAL ENCOUNTER (EMERGENCY)
Facility: HOSPITAL | Age: 89
Discharge: HOME | End: 2024-06-13
Attending: STUDENT IN AN ORGANIZED HEALTH CARE EDUCATION/TRAINING PROGRAM
Payer: MEDICARE

## 2024-06-12 DIAGNOSIS — F32.A DEPRESSION, UNSPECIFIED DEPRESSION TYPE: Primary | ICD-10-CM

## 2024-06-12 PROCEDURE — 99285 EMERGENCY DEPT VISIT HI MDM: CPT | Mod: 25

## 2024-06-12 PROCEDURE — 96360 HYDRATION IV INFUSION INIT: CPT

## 2024-06-12 ASSESSMENT — COLUMBIA-SUICIDE SEVERITY RATING SCALE - C-SSRS
6. HAVE YOU EVER DONE ANYTHING, STARTED TO DO ANYTHING, OR PREPARED TO DO ANYTHING TO END YOUR LIFE?: NO
2. HAVE YOU ACTUALLY HAD ANY THOUGHTS OF KILLING YOURSELF?: NO
1. IN THE PAST MONTH, HAVE YOU WISHED YOU WERE DEAD OR WISHED YOU COULD GO TO SLEEP AND NOT WAKE UP?: NO

## 2024-06-12 ASSESSMENT — PAIN SCALES - GENERAL: PAINLEVEL_OUTOF10: 0 - NO PAIN

## 2024-06-12 ASSESSMENT — PAIN - FUNCTIONAL ASSESSMENT: PAIN_FUNCTIONAL_ASSESSMENT: 0-10

## 2024-06-13 ENCOUNTER — APPOINTMENT (OUTPATIENT)
Dept: RADIOLOGY | Facility: HOSPITAL | Age: 89
End: 2024-06-13
Payer: MEDICARE

## 2024-06-13 ENCOUNTER — APPOINTMENT (OUTPATIENT)
Dept: CARDIOLOGY | Facility: HOSPITAL | Age: 89
End: 2024-06-13
Payer: MEDICARE

## 2024-06-13 VITALS
WEIGHT: 140 LBS | SYSTOLIC BLOOD PRESSURE: 138 MMHG | HEIGHT: 63 IN | OXYGEN SATURATION: 98 % | TEMPERATURE: 98.4 F | DIASTOLIC BLOOD PRESSURE: 78 MMHG | HEART RATE: 74 BPM | BODY MASS INDEX: 24.8 KG/M2 | RESPIRATION RATE: 18 BRPM

## 2024-06-13 LAB
ALBUMIN SERPL BCP-MCNC: 3.2 G/DL (ref 3.4–5)
ALP SERPL-CCNC: 78 U/L (ref 33–136)
ALT SERPL W P-5'-P-CCNC: 15 U/L (ref 7–45)
AMPHETAMINES UR QL SCN: ABNORMAL
ANION GAP SERPL CALC-SCNC: 11 MMOL/L (ref 10–20)
APAP SERPL-MCNC: <10 UG/ML
APPEARANCE UR: CLEAR
AST SERPL W P-5'-P-CCNC: 18 U/L (ref 9–39)
BARBITURATES UR QL SCN: ABNORMAL
BASOPHILS # BLD AUTO: 0.02 X10*3/UL (ref 0–0.1)
BASOPHILS NFR BLD AUTO: 0.4 %
BENZODIAZ UR QL SCN: ABNORMAL
BILIRUB SERPL-MCNC: 0.4 MG/DL (ref 0–1.2)
BILIRUB UR STRIP.AUTO-MCNC: NEGATIVE MG/DL
BUN SERPL-MCNC: 45 MG/DL (ref 6–23)
BZE UR QL SCN: ABNORMAL
CALCIUM SERPL-MCNC: 9.1 MG/DL (ref 8.6–10.3)
CANNABINOIDS UR QL SCN: ABNORMAL
CARDIAC TROPONIN I PNL SERPL HS: 4 NG/L (ref 0–13)
CHLORIDE SERPL-SCNC: 112 MMOL/L (ref 98–107)
CO2 SERPL-SCNC: 20 MMOL/L (ref 21–32)
COLOR UR: ABNORMAL
CREAT SERPL-MCNC: 1.59 MG/DL (ref 0.5–1.05)
EGFRCR SERPLBLD CKD-EPI 2021: 31 ML/MIN/1.73M*2
EOSINOPHIL # BLD AUTO: 0 X10*3/UL (ref 0–0.4)
EOSINOPHIL NFR BLD AUTO: 0 %
ERYTHROCYTE [DISTWIDTH] IN BLOOD BY AUTOMATED COUNT: 15.9 % (ref 11.5–14.5)
ETHANOL SERPL-MCNC: <10 MG/DL
FENTANYL+NORFENTANYL UR QL SCN: ABNORMAL
GLUCOSE SERPL-MCNC: 122 MG/DL (ref 74–99)
GLUCOSE UR STRIP.AUTO-MCNC: NORMAL MG/DL
HCT VFR BLD AUTO: 28.3 % (ref 36–46)
HGB BLD-MCNC: 9 G/DL (ref 12–16)
HOLD SPECIMEN: NORMAL
IMM GRANULOCYTES # BLD AUTO: 0.02 X10*3/UL (ref 0–0.5)
IMM GRANULOCYTES NFR BLD AUTO: 0.4 % (ref 0–0.9)
KETONES UR STRIP.AUTO-MCNC: NEGATIVE MG/DL
LEUKOCYTE ESTERASE UR QL STRIP.AUTO: ABNORMAL
LYMPHOCYTES # BLD AUTO: 2.64 X10*3/UL (ref 0.8–3)
LYMPHOCYTES NFR BLD AUTO: 50.4 %
MCH RBC QN AUTO: 31.3 PG (ref 26–34)
MCHC RBC AUTO-ENTMCNC: 31.8 G/DL (ref 32–36)
MCV RBC AUTO: 98 FL (ref 80–100)
METHADONE UR QL SCN: ABNORMAL
MONOCYTES # BLD AUTO: 0.76 X10*3/UL (ref 0.05–0.8)
MONOCYTES NFR BLD AUTO: 14.5 %
NEUTROPHILS # BLD AUTO: 1.8 X10*3/UL (ref 1.6–5.5)
NEUTROPHILS NFR BLD AUTO: 34.3 %
NITRITE UR QL STRIP.AUTO: NEGATIVE
NRBC BLD-RTO: 0 /100 WBCS (ref 0–0)
OPIATES UR QL SCN: ABNORMAL
OXYCODONE+OXYMORPHONE UR QL SCN: ABNORMAL
PCP UR QL SCN: ABNORMAL
PH UR STRIP.AUTO: 6.5 [PH]
PLATELET # BLD AUTO: 197 X10*3/UL (ref 150–450)
POTASSIUM SERPL-SCNC: 3.9 MMOL/L (ref 3.5–5.3)
PROT SERPL-MCNC: 6.7 G/DL (ref 6.4–8.2)
PROT UR STRIP.AUTO-MCNC: ABNORMAL MG/DL
RBC # BLD AUTO: 2.88 X10*6/UL (ref 4–5.2)
RBC # UR STRIP.AUTO: NEGATIVE /UL
RBC #/AREA URNS AUTO: ABNORMAL /HPF
SALICYLATES SERPL-MCNC: <3 MG/DL
SODIUM SERPL-SCNC: 139 MMOL/L (ref 136–145)
SP GR UR STRIP.AUTO: 1.02
SQUAMOUS #/AREA URNS AUTO: ABNORMAL /HPF
UROBILINOGEN UR STRIP.AUTO-MCNC: NORMAL MG/DL
WBC # BLD AUTO: 5.2 X10*3/UL (ref 4.4–11.3)
WBC #/AREA URNS AUTO: ABNORMAL /HPF

## 2024-06-13 PROCEDURE — 84484 ASSAY OF TROPONIN QUANT: CPT | Performed by: STUDENT IN AN ORGANIZED HEALTH CARE EDUCATION/TRAINING PROGRAM

## 2024-06-13 PROCEDURE — 36415 COLL VENOUS BLD VENIPUNCTURE: CPT | Performed by: STUDENT IN AN ORGANIZED HEALTH CARE EDUCATION/TRAINING PROGRAM

## 2024-06-13 PROCEDURE — 70450 CT HEAD/BRAIN W/O DYE: CPT

## 2024-06-13 PROCEDURE — 72128 CT CHEST SPINE W/O DYE: CPT

## 2024-06-13 PROCEDURE — 85025 COMPLETE CBC W/AUTO DIFF WBC: CPT | Performed by: STUDENT IN AN ORGANIZED HEALTH CARE EDUCATION/TRAINING PROGRAM

## 2024-06-13 PROCEDURE — 70450 CT HEAD/BRAIN W/O DYE: CPT | Performed by: SURGERY

## 2024-06-13 PROCEDURE — 72125 CT NECK SPINE W/O DYE: CPT | Performed by: SURGERY

## 2024-06-13 PROCEDURE — 93005 ELECTROCARDIOGRAM TRACING: CPT

## 2024-06-13 PROCEDURE — 2500000004 HC RX 250 GENERAL PHARMACY W/ HCPCS (ALT 636 FOR OP/ED): Performed by: STUDENT IN AN ORGANIZED HEALTH CARE EDUCATION/TRAINING PROGRAM

## 2024-06-13 PROCEDURE — 72125 CT NECK SPINE W/O DYE: CPT

## 2024-06-13 PROCEDURE — 99223 1ST HOSP IP/OBS HIGH 75: CPT

## 2024-06-13 PROCEDURE — 72128 CT CHEST SPINE W/O DYE: CPT | Performed by: SURGERY

## 2024-06-13 PROCEDURE — 71045 X-RAY EXAM CHEST 1 VIEW: CPT

## 2024-06-13 PROCEDURE — 80053 COMPREHEN METABOLIC PANEL: CPT | Performed by: STUDENT IN AN ORGANIZED HEALTH CARE EDUCATION/TRAINING PROGRAM

## 2024-06-13 PROCEDURE — 71045 X-RAY EXAM CHEST 1 VIEW: CPT | Performed by: RADIOLOGY

## 2024-06-13 PROCEDURE — 80307 DRUG TEST PRSMV CHEM ANLYZR: CPT | Performed by: STUDENT IN AN ORGANIZED HEALTH CARE EDUCATION/TRAINING PROGRAM

## 2024-06-13 PROCEDURE — 80143 DRUG ASSAY ACETAMINOPHEN: CPT | Performed by: STUDENT IN AN ORGANIZED HEALTH CARE EDUCATION/TRAINING PROGRAM

## 2024-06-13 PROCEDURE — 81001 URINALYSIS AUTO W/SCOPE: CPT | Performed by: STUDENT IN AN ORGANIZED HEALTH CARE EDUCATION/TRAINING PROGRAM

## 2024-06-13 RX ORDER — HYDROXYZINE HYDROCHLORIDE 25 MG/1
25 TABLET, FILM COATED ORAL EVERY 8 HOURS PRN
Qty: 20 TABLET | Refills: 0 | Status: SHIPPED | OUTPATIENT
Start: 2024-06-13

## 2024-06-13 RX ORDER — SERTRALINE HYDROCHLORIDE 25 MG/1
25 TABLET, FILM COATED ORAL DAILY
Qty: 30 TABLET | Refills: 0 | Status: SHIPPED | OUTPATIENT
Start: 2024-06-13 | End: 2024-07-13

## 2024-06-13 SDOH — HEALTH STABILITY: MENTAL HEALTH

## 2024-06-13 SDOH — HEALTH STABILITY: MENTAL HEALTH: SLEEP PATTERN: RESTLESSNESS

## 2024-06-13 SDOH — HEALTH STABILITY: MENTAL HEALTH: HAVE YOU EVER DONE ANYTHING, STARTED TO DO ANYTHING, OR PREPARED TO DO ANYTHING TO END YOUR LIFE?: NO

## 2024-06-13 SDOH — HEALTH STABILITY: MENTAL HEALTH: HAVE YOU WISHED YOU WERE DEAD OR WISHED YOU COULD GO TO SLEEP AND NOT WAKE UP?: NO

## 2024-06-13 SDOH — HEALTH STABILITY: MENTAL HEALTH: BEHAVIORS/MOOD: ANXIOUS;LABILE

## 2024-06-13 SDOH — HEALTH STABILITY: MENTAL HEALTH: HAVE YOU ACTUALLY HAD ANY THOUGHTS OF KILLING YOURSELF?: NO

## 2024-06-13 SDOH — HEALTH STABILITY: MENTAL HEALTH: DELUSIONS: OTHER (COMMENT)

## 2024-06-13 SDOH — HEALTH STABILITY: MENTAL HEALTH: SUICIDE ASSESSMENT: ADULT (C-SSRS)

## 2024-06-13 SDOH — HEALTH STABILITY: MENTAL HEALTH: CONTENT: BLAMING SELF

## 2024-06-13 ASSESSMENT — LIFESTYLE VARIABLES
EVER HAD A DRINK FIRST THING IN THE MORNING TO STEADY YOUR NERVES TO GET RID OF A HANGOVER: NO
EVER FELT BAD OR GUILTY ABOUT YOUR DRINKING: NO
TOTAL SCORE: 0
HAVE PEOPLE ANNOYED YOU BY CRITICIZING YOUR DRINKING: NO
HAVE YOU EVER FELT YOU SHOULD CUT DOWN ON YOUR DRINKING: NO

## 2024-06-13 NOTE — PROGRESS NOTES
Emergency Medicine Transition of Care Note    I received Merna Jaffe in signout from Dr. Fitzgerald.  Please see the previous ED provider note for all HPI, PE and MDM up to the time of signout at 0700. This is in addition to the primary record.    In brief Merna Jaffe is an 88 y.o. female presenting for   Chief Complaint   Patient presents with    Altered Mental Status     Pt brought in by family dt confusion x5 days. Pt was diagnosed with PNA 2 weeks prior and went to rehab for 2 days. Appeared confused since being home     At the time of signout we were awaiting: EPAT evaluation    Diagnoses as of 06/13/24 0921   Depression, unspecified depression type       Medical Decision Making    Labs Reviewed   URINALYSIS WITH REFLEX MICROSCOPIC - Abnormal       Result Value    Color, Urine Light-Yellow      Appearance, Urine Clear      Specific Gravity, Urine 1.019      pH, Urine 6.5      Protein, Urine 10 (TRACE)      Glucose, Urine Normal      Blood, Urine NEGATIVE      Ketones, Urine NEGATIVE      Bilirubin, Urine NEGATIVE      Urobilinogen, Urine Normal      Nitrite, Urine NEGATIVE      Leukocyte Esterase, Urine 250 Andrew/µL (*)    COMPREHENSIVE METABOLIC PANEL - Abnormal    Glucose 122 (*)     Sodium 139      Potassium 3.9      Chloride 112 (*)     Bicarbonate 20 (*)     Anion Gap 11      Urea Nitrogen 45 (*)     Creatinine 1.59 (*)     eGFR 31 (*)     Calcium 9.1      Albumin 3.2 (*)     Alkaline Phosphatase 78      Total Protein 6.7      AST 18      Bilirubin, Total 0.4      ALT 15     CBC WITH AUTO DIFFERENTIAL - Abnormal    WBC 5.2      nRBC 0.0      RBC 2.88 (*)     Hemoglobin 9.0 (*)     Hematocrit 28.3 (*)     MCV 98      MCH 31.3      MCHC 31.8 (*)     RDW 15.9 (*)     Platelets 197      Neutrophils % 34.3      Immature Granulocytes %, Automated 0.4      Lymphocytes % 50.4      Monocytes % 14.5      Eosinophils % 0.0      Basophils % 0.4      Neutrophils Absolute 1.80      Immature Granulocytes Absolute,  Automated 0.02      Lymphocytes Absolute 2.64      Monocytes Absolute 0.76      Eosinophils Absolute 0.00      Basophils Absolute 0.02     DRUG SCREEN,URINE - Abnormal    Amphetamine Screen, Urine Presumptive Negative      Barbiturate Screen, Urine Presumptive Negative      Benzodiazepines Screen, Urine Presumptive Positive (*)     Cannabinoid Screen, Urine Presumptive Negative      Cocaine Metabolite Screen, Urine Presumptive Negative      Fentanyl Screen, Urine Presumptive Negative      Opiate Screen, Urine Presumptive Negative      Oxycodone Screen, Urine Presumptive Negative      PCP Screen, Urine Presumptive Negative      Methadone Screen, Urine Presumptive Negative      Narrative:     Drug screen results are presumptive and should not be used to assess   compliance with prescribed medication. Contact the performing [facility] laboratory   to add-on definitive confirmatory testing if clinically indicated.    Toxicology screening results are reported qualitatively. The concentration must   be greater than or equal to the cutoff to be reported as positive. The concentration   at which the screening test can detect an individual drug or metabolite varies.   The absence of expected drug(s) and/or drug metabolite(s) may indicate non-compliance,   inappropriate timing of specimen collection relative to drug administration, poor drug   absorption, diluted/adulterated urine, or limitations of testing. For medical purposes   only; not valid for forensic use.    Interpretive questions should be directed to the laboratory medical directors.   MICROSCOPIC ONLY, URINE - Abnormal    WBC, Urine 6-10 (*)     RBC, Urine 6-10 (*)     Squamous Epithelial Cells, Urine 1-9 (SPARSE)     TROPONIN I, HIGH SENSITIVITY - Normal    Troponin I, High Sensitivity 4      Narrative:     Less than 99th percentile of normal range cutoff-  Female and children under 18 years old <14 ng/L; Male <21 ng/L: Negative  Repeat testing should be performed if  clinically indicated.     Female and children under 18 years old 14-50 ng/L; Male 21-50 ng/L:  Consistent with possible cardiac damage and possible increased clinical   risk. Serial measurements may help to assess extent of myocardial damage.     >50 ng/L: Consistent with cardiac damage, increased clinical risk and  myocardial infarction. Serial measurements may help assess extent of   myocardial damage.      NOTE: Children less than 1 year old may have higher baseline troponin   levels and results should be interpreted in conjunction with the overall   clinical context.     NOTE: Troponin I testing is performed using a different   testing methodology at [facility] than at other   [facility]. Direct result comparisons should only   be made within the same method.   ACUTE TOXICOLOGY PANEL, BLOOD - Normal    Acetaminophen <10.0      Salicylate  <3      Alcohol <10     URINE GRAY TUBE   POCT FINGERSTICK GLUCOSE       CT head wo IV contrast   Final Result   No evidence of acute intracranial abnormality.        No acute cervical spine fracture or malalignment.             MACRO:   None        Signed by: Minesh Aguillon 6/13/2024 1:21 AM   Dictation workstation:   [id number]      CT cervical spine wo IV contrast   Final Result   No evidence of acute intracranial abnormality.        No acute cervical spine fracture or malalignment.             MACRO:   None        Signed by: Minesh Aguillon 6/13/2024 1:21 AM   Dictation workstation:   [id number]      CT thoracic spine wo IV contrast   Final Result   No acute thoracic spine fracture or malalignment.        Patchy asymmetric airspace opacity laterally in the right lower lobe   is suspicious for pneumonia (series 403, images 56-62); mild   pulmonary contusion is also possible in the setting of recent   trauma.. Patchy consolidative opacities in the posterior aspects of   both lung bases is suspicious for pneumonia. Some of this appearance   could also relate to  scarring or atelectasis. A large hiatal hernia   contains intrathoracic stomach.        MACRO:   None        Signed by: Minesh Aguillon 6/13/2024 1:25 AM   Dictation workstation:   [id number]      XR chest 1 view   Final Result   There is mild blunting of the right costophrenic angle which may   relate to small effusion and atelectasis with superimposed infection   not excluded. Correlate clinically.        Moderate to large hiatal hernia.        Suggestive of COPD.        MACRO:   None        Signed by: Jack Cruz 6/13/2024 1:05 AM   Dictation workstation:   [id number]        At this time, after performance of history, physical exam and diagnostic evaluation, it has been determined that this patient is medically clear, and will benefit from further psychiatric stabilization and care.      The findings on the chest x-ray are believed to be chronic from her recent pneumonia and does not represent new infection.  The leukocytes in her urine are also likely contamination and do not represent a true UTI.    The patient was evaluated by Di from the emergency psychiatric assessment team.  She does not feel that the patient requires hospitalization at this time.  She is going to refer the patient to outpatient Pat psych.  She does recommend that I start the patient on 25 mg daily of Zoloft for her depression and 25 mg every 8 hours as needed for anxiety of Atarax.    I discussed the results and discharge plan with the patient and/or family/friend if present.  I emphasized the importance of follow up with the physician I referred them to in the timeframe recommended.  I explained reasons for the patient to return to the Emergency Department.  Questions were addressed.  The patient and/or family/friend expressed understanding.            Final diagnoses:   [F32.A] Depression, unspecified depression type           Procedure  Procedures    Nicolas Keating MD

## 2024-06-13 NOTE — CONSULTS
Consults  Referring Provider  Hector Fitzgerald DO    History Of Present Illness  Merna Jaffe is a 88 y.o. female presenting to [facility] ED with her son via triage on 6/12/24 for c/c of AMS x 5 days. Pt reported that she stopped taking her effexor due to AE & was noted to be quite labile & tearful in ED resulting in psychiatry consult. UDS (+) for benzos (pt prescribed xanax). She has been calm & cooperative since arrival.     Past Medical History  She has a past medical history of Acute renal failure (CMS-HCC) (05/20/2024), Anxiety, Depression, Hypertension, Insomnia, Poison oak dermatitis (08/23/2021), Pruritic dermatitis (08/23/2021), Rotator cuff tendinitis (05/20/2024), and Shoulder pain (05/20/2024).    Past Psychiatric History  Mixed anxiety & depression    Surgical History  She has a past surgical history that includes Hysterectomy; Esophagogastroduodenoscopy; Colonoscopy; and Nasal polyp excision.     Social History  She reports that she has never smoked. She has never used smokeless tobacco. She reports that she does not drink alcohol and does not use drugs.    Current living situation: alone  Current employment/source of income:  [employer]  Access to weapons: denies    Prior psychiatric hospitalizations: denies  Prior rehab/detox: denies  History of suicide attempts: denies  History of self-harm: denies  History of trauma/abuse/loss:  passed away 02/2023  History of violence: denies        Current psychiatric medications: effexor 150 mg & PRN xanax 0.25 mg  Past psychiatric medications: zoloft, paxil    OARRS: xanax fills, most recent 5/20/24    Allergies  Lactose intolerance (lactase) [lactase] and Amoxicillin    Review of Systems    Psychiatric ROS - Adult  Anxiety: General Anxiety Disorder (TOMAS)TOMAS Behaviors: difficult to control worry, excessive anxiety/worry, difficulty concentrating, irritability, restlessness, and sleep disturbance  Depression: negative  Delirium: negative  Psychosis:  "negative  Sarah: negative  Safety Issues: none    Physical Exam    Mental Status Exam  General: dressed in personal attire, seen via telemedicine  Appearance: appears stated age  Attitude: anxious, cooperative  Behavior: appropriate eye contact  Motor Activity: no claudia PMAR. Gait not assessed.  Speech: appropriate rate, rhythm, volume, tone. Spontaneous, fluent.  Mood: \"I just want to feel better!\"  Affect: congruent, anxious  Thought Process: linear, logical, goal-oriented  Thought Content: denies current SI/HI. No overt delusions  Thought Perception: denies AVH. Does not appear to be responding to hallucinatory stimuli  Cognition: alert & grossly oriented  Insight: fair  Judgement: fair    Psychiatric Risk Assessment  Violence Risk Assessment: none  Acute Risk of Harm to Others is Considered: low   Suicide Risk Assessment: age > 65 yrs old, , and current psychiatric illness  Protective Factors against Suicide: hopefulness/future orientation, positive family relationships, and social support/connectedness  Acute Risk of Harm to Self is Considered: low    Last Recorded Vitals  Blood pressure 138/78, pulse 74, temperature 36.9 °C (98.4 °F), temperature source Tympanic, resp. rate 18, height 1.6 m (5' 3\"), weight 63.5 kg (140 lb), SpO2 98%.    Relevant Results  Scheduled medications    Continuous medications    PRN medications    Results for orders placed or performed during the hospital encounter of 06/12/24 (from the past 24 hour(s))   Troponin I, High Sensitivity   Result Value Ref Range    Troponin I, High Sensitivity 4 0 - 13 ng/L   Comprehensive Metabolic Panel   Result Value Ref Range    Glucose 122 (H) 74 - 99 mg/dL    Sodium 139 136 - 145 mmol/L    Potassium 3.9 3.5 - 5.3 mmol/L    Chloride 112 (H) 98 - 107 mmol/L    Bicarbonate 20 (L) 21 - 32 mmol/L    Anion Gap 11 10 - 20 mmol/L    Urea Nitrogen 45 (H) 6 - 23 mg/dL    Creatinine 1.59 (H) 0.50 - 1.05 mg/dL    eGFR 31 (L) >60 mL/min/1.73m*2    " Calcium 9.1 8.6 - 10.3 mg/dL    Albumin 3.2 (L) 3.4 - 5.0 g/dL    Alkaline Phosphatase 78 33 - 136 U/L    Total Protein 6.7 6.4 - 8.2 g/dL    AST 18 9 - 39 U/L    Bilirubin, Total 0.4 0.0 - 1.2 mg/dL    ALT 15 7 - 45 U/L   CBC and Auto Differential   Result Value Ref Range    WBC 5.2 4.4 - 11.3 x10*3/uL    nRBC 0.0 0.0 - 0.0 /100 WBCs    RBC 2.88 (L) 4.00 - 5.20 x10*6/uL    Hemoglobin 9.0 (L) 12.0 - 16.0 g/dL    Hematocrit 28.3 (L) 36.0 - 46.0 %    MCV 98 80 - 100 fL    MCH 31.3 26.0 - 34.0 pg    MCHC 31.8 (L) 32.0 - 36.0 g/dL    RDW 15.9 (H) 11.5 - 14.5 %    Platelets 197 150 - 450 x10*3/uL    Neutrophils % 34.3 40.0 - 80.0 %    Immature Granulocytes %, Automated 0.4 0.0 - 0.9 %    Lymphocytes % 50.4 13.0 - 44.0 %    Monocytes % 14.5 2.0 - 10.0 %    Eosinophils % 0.0 0.0 - 6.0 %    Basophils % 0.4 0.0 - 2.0 %    Neutrophils Absolute 1.80 1.60 - 5.50 x10*3/uL    Immature Granulocytes Absolute, Automated 0.02 0.00 - 0.50 x10*3/uL    Lymphocytes Absolute 2.64 0.80 - 3.00 x10*3/uL    Monocytes Absolute 0.76 0.05 - 0.80 x10*3/uL    Eosinophils Absolute 0.00 0.00 - 0.40 x10*3/uL    Basophils Absolute 0.02 0.00 - 0.10 x10*3/uL   Acute Toxicology Panel, Blood   Result Value Ref Range    Acetaminophen <10.0 10.0 - 30.0 ug/mL    Salicylate  <3 4 - 20 mg/dL    Alcohol <10 <=10 mg/dL   ECG 12 Lead   Result Value Ref Range    Ventricular Rate 78 BPM    Atrial Rate 78 BPM    WI Interval 112 ms    QRS Duration 82 ms    QT Interval 404 ms    QTC Calculation(Bazett) 460 ms    P Axis 56 degrees    R Axis 23 degrees    T Axis 55 degrees    QRS Count 13 beats    Q Onset 215 ms    P Onset 159 ms    P Offset 204 ms    T Offset 417 ms    QTC Fredericia 441 ms   Urinalysis with Reflex Microscopic   Result Value Ref Range    Color, Urine Light-Yellow Light-Yellow, Yellow, Dark-Yellow    Appearance, Urine Clear Clear    Specific Gravity, Urine 1.019 1.005 - 1.035    pH, Urine 6.5 5.0, 5.5, 6.0, 6.5, 7.0, 7.5, 8.0    Protein, Urine 10  (TRACE) NEGATIVE, 10 (TRACE), 20 (TRACE) mg/dL    Glucose, Urine Normal Normal mg/dL    Blood, Urine NEGATIVE NEGATIVE    Ketones, Urine NEGATIVE NEGATIVE mg/dL    Bilirubin, Urine NEGATIVE NEGATIVE    Urobilinogen, Urine Normal Normal mg/dL    Nitrite, Urine NEGATIVE NEGATIVE    Leukocyte Esterase, Urine 250 Andrew/µL (A) NEGATIVE   Drug Screen, Urine   Result Value Ref Range    Amphetamine Screen, Urine Presumptive Negative Presumptive Negative    Barbiturate Screen, Urine Presumptive Negative Presumptive Negative    Benzodiazepines Screen, Urine Presumptive Positive (A) Presumptive Negative    Cannabinoid Screen, Urine Presumptive Negative Presumptive Negative    Cocaine Metabolite Screen, Urine Presumptive Negative Presumptive Negative    Fentanyl Screen, Urine Presumptive Negative Presumptive Negative    Opiate Screen, Urine Presumptive Negative Presumptive Negative    Oxycodone Screen, Urine Presumptive Negative Presumptive Negative    PCP Screen, Urine Presumptive Negative Presumptive Negative    Methadone Screen, Urine Presumptive Negative Presumptive Negative   Microscopic Only, Urine   Result Value Ref Range    WBC, Urine 6-10 (A) 1-5, NONE /HPF    RBC, Urine 6-10 (A) NONE, 1-2, 3-5 /HPF    Squamous Epithelial Cells, Urine 1-9 (SPARSE) Reference range not established. /HPF          Assessment/Plan     Pt seen via telemedicine in live time with auditory & visual abilities. Pt verified their name &  & consented to interview.    Upon chart review, pt's PCP (Dr. Marko Webb at [facility]) has been managing pt's psychiatric medications since at least . She has been prescribed the effexor & xanax since  as well (this is as far as the chart goes back to). The pt's  passed away 2023 & her PCP increased her effexor from 37.5 mg to 150 mg in 2023. The pt reports after acquiring pneumonia 1 month ago (admitted for 1 week at [facility], admitted to ICU & discharged to acute rehab), she began to  "\"hate how the effexor made me feel. I was feeling crazy!\". Pt says she stopped taking her effexor \"cold turkey\" almost 2 weeks ago. Pt says she has noticed she cries easier since stopping the effexor. At baseline, pt has insomnia & she has noticed worsening of her ability to stay & fall asleep since stopping the effexor as well. The pt started taking here PRN xanax about 3 weeks ago (fill on 5/20/24, recent before that was in 01/2024).     The pt reports that her son Christiano is in town from [place] & he was at her house last night & \"was sick of hearing me cry & mumbling around the house. He must of thought I was crazy.\"    The pt denies symptoms of depression, voices that she \"just wants to feel better\" & wishes to not resume any dosing of effexor. Pt voices that she does not like to take the xanax often \"because I don't want to get hooked on that stuff\" but over the past few weeks she has been taking the xanax more regularly. Pt denies SI/HI/AVH. She is future oriented to \"feel better\" & is agreeable to trial zoloft (she has been on in the past) with a referral to geriatric psychiatry. Pt feels safe to be discharged home. Pt has upcoming appointment with PCP 7/9/24.    Concern that pt is experiencing withdrawal symptoms from abrupt discontinuation of SNRI. Pt also reports restarting benzodiazepines when she stopped her effexor & this might be contributing to AMS reported by pt's son (unable to clarify what symptoms pt was exhibiting as I cannot get ahold of her son). Will recommend pt begin zoloft 25 mg PO daily & atarax 25 mg Q8 hours PRN anxiety & follow-up with outpatient psychiatry.     Collateral  I attempted to call pt's sons (Christiano @ [phone] & Michael @ [phone]), but no answer.    Impression  Anxiety, NOS    Recommendations  Following a chart review, safety risk assessment, interview with pt and ED staff, pt does not meet criteria for involuntary inpatient psychiatric hospitalization at this time. I am " not recommending any medication management changes. Please encourage pt to follow-up with outpatient provider.   -Faxing over resources for pt to establish with geriatric psychiatry    I discussed these recommendations with the current ED provider (Dr. Keating) who was in agreement with above plan of care.    Medication Consent  Medication Consent: n/a; consult service    EVAN Lara-CNP

## 2024-06-13 NOTE — ED PROVIDER NOTES
HPI   Chief Complaint   Patient presents with    Altered Mental Status     Pt brought in by family dt confusion x5 days. Pt was diagnosed with PNA 2 weeks prior and went to rehab for 2 days. Appeared confused since being home       Patient 88-year-old female presenting to the emergency department with family for complaints of abnormal behavior.  Patient recently recovered from pneumonia and spent 2 days in rehab and was discharged home.  Family states that since the patient has been home she has not particularly been caring for self has been labile with her moods consistently crying.  Patient has been taking her Xanax as needed for anxiety but states that she does not like the way her Effexor makes her feel that she has not been taking it.  No reported fevers, chills, chest pain, difficulty breathing, productive cough, nausea, vomiting, diarrhea, syncopal episodes, falls, traumatic injuries.  No history of Alzheimer's or dementia. No reported dysuria or hematuria. Patient denies any suicidal homicidal ideations.  Patient denies any auditory visual hallucinations.  Patient does states she has been having some back pain since a fall while at rehab that is reproducible with movement and palpation.      History provided by:  Patient and relative                      Kyler Coma Scale Score: 15                     Patient History   Past Medical History:   Diagnosis Date    Acute renal failure ([facility]) 05/20/2024    Anxiety     Depression     Hypertension     Insomnia     Poison oak dermatitis 08/23/2021    Pruritic dermatitis 08/23/2021    Rotator cuff tendinitis 05/20/2024    Shoulder pain 05/20/2024     Past Surgical History:   Procedure Laterality Date    COLONOSCOPY      ESOPHAGOGASTRODUODENOSCOPY      HYSTERECTOMY      NASAL POLYP EXCISION       Family History   Problem Relation Name Age of Onset    Heart disease Mother      Heart disease Sister       Social History     Tobacco Use    Smoking status: Never     Smokeless tobacco: Never   Substance Use Topics    Alcohol use: Never    Drug use: Never       Physical Exam   ED Triage Vitals [06/12/24 2255]   Temperature Heart Rate Respirations BP   36.9 °C (98.4 °F) 88 18 131/79      Pulse Ox Temp Source Heart Rate Source Patient Position   99 % Tympanic Monitor Sitting      BP Location FiO2 (%)     Right arm --       Physical Exam  Vitals and nursing note reviewed.   Constitutional:       General: She is not in acute distress.     Appearance: Normal appearance. She is not ill-appearing, toxic-appearing or diaphoretic.   HENT:      Head: Normocephalic and atraumatic.      Nose: Nose normal.      Mouth/Throat:      Mouth: Mucous membranes are moist.      Pharynx: No oropharyngeal exudate or posterior oropharyngeal erythema.   Eyes:      General: No scleral icterus.     Extraocular Movements: Extraocular movements intact.      Pupils: Pupils are equal, round, and reactive to light.   Cardiovascular:      Rate and Rhythm: Normal rate and regular rhythm.      Pulses: Normal pulses.      Heart sounds: Normal heart sounds. No murmur heard.     No friction rub. No gallop.   Pulmonary:      Effort: Pulmonary effort is normal. No respiratory distress.      Breath sounds: Normal breath sounds. No stridor. No wheezing, rhonchi or rales.   Chest:      Chest wall: No tenderness.   Abdominal:      General: Abdomen is flat. There is no distension.      Palpations: Abdomen is soft. There is no mass.      Tenderness: There is no abdominal tenderness. There is no guarding.      Hernia: No hernia is present.   Musculoskeletal:         General: No swelling, tenderness, deformity or signs of injury. Normal range of motion.      Cervical back: Normal range of motion and neck supple. No rigidity.   Skin:     General: Skin is warm and dry.      Capillary Refill: Capillary refill takes less than 2 seconds.      Coloration: Skin is not jaundiced or pale.      Findings: No bruising, erythema, lesion or  rash.   Neurological:      General: No focal deficit present.      Mental Status: She is alert and oriented to person, place, and time. Mental status is at baseline.   Psychiatric:         Attention and Perception: She is inattentive.         Mood and Affect: Mood is depressed. Affect is labile and tearful.         Speech: Speech normal.         Behavior: Behavior is cooperative.         Thought Content: Thought content does not include homicidal or suicidal ideation. Thought content does not include homicidal or suicidal plan.         ED Course & MDM   Diagnoses as of 06/13/24 0633   Depression, unspecified depression type       Medical Decision Making  Patient is an 88-year-old female presenting to the emergency department for chief complaint of altered mental status abnormal behavior.  Patient is able to answer all my questions appropriately but does have a depressed mood and labile and tearful affect.  Patient is consistently crying throughout the interview stating that she would like to no longer feel this way.  She denies any suicidal or homicidal ideations.  She denies any auditory or visual hallucinations.  Due to reported fall a week ago with some back pain labs and CT images were ordered.    CBC without leukocytosis.  Patient has baseline anemia.  No thrombocytopenia.  Metabolic panel with baseline CKD.  Troponin was negative.  Serum tox screen was negative.  Chest x-ray with blunting of right costophrenic angle but otherwise unremarkable.  CT head negative for acute intracranial abnormalities.  CT C-spine negative for acute fractures or subluxations.  CT T-spine without any fractures or subluxations.  Patient does have some airspace opacities which I believe are sequelae from patient's previously treated pneumonia as she is not having any fevers chills difficulty breathing or adventitious lung sounds at this time.  Do not believe that this represents clinical pneumonia. I am concerned that without the  patient taking her Effexor that she is having significant difficulties with her depression that are causing her symptoms and thus EPAT evaluation was recommended. Urinalysis negative for nitrites but does have leukocyte esterase with 6-10 white blood cells but no apparent bacteria.  Patient denied any urinary symptoms.  Will defer treatment to culture.     Patient is medically cleared for psychiatric evaluation.    Patient signed out to oncoming provider pending EPAT evaluation and recommendations.    Amount and/or Complexity of Data Reviewed  Labs: ordered. Decision-making details documented in ED Course.  Radiology: ordered. Decision-making details documented in ED Course.  ECG/medicine tests: independent interpretation performed.     Details: Sinus rhythm with a ventricular rate of 78 bpm.  Questionable 82 ms.  QTc 460 ms.  Normal axis.  No acute ischemic injury pattern noted.      Labs Reviewed   URINALYSIS WITH REFLEX MICROSCOPIC - Abnormal       Result Value    Color, Urine Light-Yellow      Appearance, Urine Clear      Specific Gravity, Urine 1.019      pH, Urine 6.5      Protein, Urine 10 (TRACE)      Glucose, Urine Normal      Blood, Urine NEGATIVE      Ketones, Urine NEGATIVE      Bilirubin, Urine NEGATIVE      Urobilinogen, Urine Normal      Nitrite, Urine NEGATIVE      Leukocyte Esterase, Urine 250 Andrew/µL (*)    COMPREHENSIVE METABOLIC PANEL - Abnormal    Glucose 122 (*)     Sodium 139      Potassium 3.9      Chloride 112 (*)     Bicarbonate 20 (*)     Anion Gap 11      Urea Nitrogen 45 (*)     Creatinine 1.59 (*)     eGFR 31 (*)     Calcium 9.1      Albumin 3.2 (*)     Alkaline Phosphatase 78      Total Protein 6.7      AST 18      Bilirubin, Total 0.4      ALT 15     CBC WITH AUTO DIFFERENTIAL - Abnormal    WBC 5.2      nRBC 0.0      RBC 2.88 (*)     Hemoglobin 9.0 (*)     Hematocrit 28.3 (*)     MCV 98      MCH 31.3      MCHC 31.8 (*)     RDW 15.9 (*)     Platelets 197      Neutrophils % 34.3       Immature Granulocytes %, Automated 0.4      Lymphocytes % 50.4      Monocytes % 14.5      Eosinophils % 0.0      Basophils % 0.4      Neutrophils Absolute 1.80      Immature Granulocytes Absolute, Automated 0.02      Lymphocytes Absolute 2.64      Monocytes Absolute 0.76      Eosinophils Absolute 0.00      Basophils Absolute 0.02     DRUG SCREEN,URINE - Abnormal    Amphetamine Screen, Urine Presumptive Negative      Barbiturate Screen, Urine Presumptive Negative      Benzodiazepines Screen, Urine Presumptive Positive (*)     Cannabinoid Screen, Urine Presumptive Negative      Cocaine Metabolite Screen, Urine Presumptive Negative      Fentanyl Screen, Urine Presumptive Negative      Opiate Screen, Urine Presumptive Negative      Oxycodone Screen, Urine Presumptive Negative      PCP Screen, Urine Presumptive Negative      Methadone Screen, Urine Presumptive Negative      Narrative:     Drug screen results are presumptive and should not be used to assess   compliance with prescribed medication. Contact the performing [facility] laboratory   to add-on definitive confirmatory testing if clinically indicated.    Toxicology screening results are reported qualitatively. The concentration must   be greater than or equal to the cutoff to be reported as positive. The concentration   at which the screening test can detect an individual drug or metabolite varies.   The absence of expected drug(s) and/or drug metabolite(s) may indicate non-compliance,   inappropriate timing of specimen collection relative to drug administration, poor drug   absorption, diluted/adulterated urine, or limitations of testing. For medical purposes   only; not valid for forensic use.    Interpretive questions should be directed to the laboratory medical directors.   MICROSCOPIC ONLY, URINE - Abnormal    WBC, Urine 6-10 (*)     RBC, Urine 6-10 (*)     Squamous Epithelial Cells, Urine 1-9 (SPARSE)     TROPONIN I, HIGH SENSITIVITY - Normal    Troponin I, High  Sensitivity 4      Narrative:     Less than 99th percentile of normal range cutoff-  Female and children under 18 years old <14 ng/L; Male <21 ng/L: Negative  Repeat testing should be performed if clinically indicated.     Female and children under 18 years old 14-50 ng/L; Male 21-50 ng/L:  Consistent with possible cardiac damage and possible increased clinical   risk. Serial measurements may help to assess extent of myocardial damage.     >50 ng/L: Consistent with cardiac damage, increased clinical risk and  myocardial infarction. Serial measurements may help assess extent of   myocardial damage.      NOTE: Children less than 1 year old may have higher baseline troponin   levels and results should be interpreted in conjunction with the overall   clinical context.     NOTE: Troponin I testing is performed using a different   testing methodology at [facility] than at other   [facility]. Direct result comparisons should only   be made within the same method.   ACUTE TOXICOLOGY PANEL, BLOOD - Normal    Acetaminophen <10.0      Salicylate  <3      Alcohol <10     URINE GRAY TUBE   POCT FINGERSTICK GLUCOSE     CT head wo IV contrast   Final Result   No evidence of acute intracranial abnormality.        No acute cervical spine fracture or malalignment.             MACRO:   None        Signed by: Minesh Aguillon 6/13/2024 1:21 AM   Dictation workstation:   [id number]      CT cervical spine wo IV contrast   Final Result   No evidence of acute intracranial abnormality.        No acute cervical spine fracture or malalignment.             MACRO:   None        Signed by: Minesh Aguillon 6/13/2024 1:21 AM   Dictation workstation:   [id number]      CT thoracic spine wo IV contrast   Final Result   No acute thoracic spine fracture or malalignment.        Patchy asymmetric airspace opacity laterally in the right lower lobe   is suspicious for pneumonia (series 403, images 56-62); mild   pulmonary contusion is also  possible in the setting of recent   trauma.. Patchy consolidative opacities in the posterior aspects of   both lung bases is suspicious for pneumonia. Some of this appearance   could also relate to scarring or atelectasis. A large hiatal hernia   contains intrathoracic stomach.        MACRO:   None        Signed by: Minesh Aguillon 6/13/2024 1:25 AM   Dictation workstation:   [id number]      XR chest 1 view   Final Result   There is mild blunting of the right costophrenic angle which may   relate to small effusion and atelectasis with superimposed infection   not excluded. Correlate clinically.        Moderate to large hiatal hernia.        Suggestive of COPD.        MACRO:   None        Signed by: Jack Cruz 6/13/2024 1:05 AM   Dictation workstation:   [id number]            Procedure  Procedures     Hector Fitzgerald DO  06/13/24 0724

## 2024-06-14 ENCOUNTER — TELEPHONE (OUTPATIENT)
Dept: FAMILY MEDICINE CLINIC | Age: 89
End: 2024-06-14

## 2024-06-14 NOTE — TELEPHONE ENCOUNTER
Patient called and stated she was in the hospital and she told them she wanted to stop taking Effexor and they told her to call her primary care doctor.      She states it makes her feel crazy and makes her tremble.  She states her whole body that trembles.

## 2024-06-16 LAB
ATRIAL RATE: 78 BPM
P AXIS: 56 DEGREES
P OFFSET: 204 MS
P ONSET: 159 MS
PR INTERVAL: 112 MS
Q ONSET: 215 MS
QRS COUNT: 13 BEATS
QRS DURATION: 82 MS
QT INTERVAL: 404 MS
QTC CALCULATION(BAZETT): 460 MS
QTC FREDERICIA: 441 MS
R AXIS: 23 DEGREES
T AXIS: 55 DEGREES
T OFFSET: 417 MS
VENTRICULAR RATE: 78 BPM

## 2024-06-17 NOTE — TELEPHONE ENCOUNTER
Lvm for patient to call back to discuss Effexor medication. Per Dr. Quintero, do not stop taking Effexor. Schedule a office visit as soon as possible.

## 2024-06-26 ENCOUNTER — OFFICE VISIT (OUTPATIENT)
Dept: FAMILY MEDICINE CLINIC | Age: 89
End: 2024-06-26

## 2024-06-26 VITALS
TEMPERATURE: 97.6 F | HEART RATE: 74 BPM | DIASTOLIC BLOOD PRESSURE: 70 MMHG | SYSTOLIC BLOOD PRESSURE: 120 MMHG | HEIGHT: 63 IN | OXYGEN SATURATION: 95 % | BODY MASS INDEX: 21.68 KG/M2

## 2024-06-26 DIAGNOSIS — F41.9 ANXIETY: ICD-10-CM

## 2024-06-26 DIAGNOSIS — D64.9 ANEMIA, UNSPECIFIED TYPE: ICD-10-CM

## 2024-06-26 DIAGNOSIS — J18.9 PNEUMONIA OF BOTH LOWER LOBES DUE TO INFECTIOUS ORGANISM: Primary | ICD-10-CM

## 2024-06-26 RX ORDER — SERTRALINE HYDROCHLORIDE 25 MG/1
25 TABLET, FILM COATED ORAL DAILY
COMMUNITY

## 2024-06-26 RX ORDER — HYDROXYZINE 50 MG/1
50 TABLET, FILM COATED ORAL EVERY 8 HOURS PRN
Qty: 30 TABLET | Refills: 0 | Status: SHIPPED | OUTPATIENT
Start: 2024-06-26 | End: 2024-07-06

## 2024-06-26 RX ORDER — ALPRAZOLAM 0.25 MG/1
0.25 TABLET ORAL NIGHTLY PRN
COMMUNITY

## 2024-06-26 RX ORDER — HYDROXYZINE HYDROCHLORIDE 25 MG/1
25 TABLET, FILM COATED ORAL 3 TIMES DAILY PRN
COMMUNITY

## 2024-06-26 RX ORDER — POTASSIUM CITRATE 10 MEQ/1
TABLET, EXTENDED RELEASE ORAL
COMMUNITY

## 2024-06-26 RX ORDER — SERTRALINE HYDROCHLORIDE 25 MG/1
25 TABLET, FILM COATED ORAL DAILY
Qty: 14 TABLET | Refills: 0 | Status: SHIPPED | OUTPATIENT
Start: 2024-06-26

## 2024-06-26 ASSESSMENT — ENCOUNTER SYMPTOMS: VOMITING: 0

## 2024-06-26 NOTE — PROGRESS NOTES
Subjective:      Patient ID: Fatimah Simmons is a 88 y.o. female    Pneumonia  This is a new problem. The current episode started more than 1 month ago.     Here in follow up from recent hospital stay for pneumonia.  From recent hospital stay at [facility].  No fever.  No sob.  No chest pain.   No emesis.   Does not have any follow up planned with anyone about this.   Also son in process of seeing psychiatry appt on July 2 thru [facility].  [facility].        Review of Systems   Constitutional:  Negative for unexpected weight change.   Gastrointestinal:  Negative for vomiting.   Skin:  Negative for rash.   Neurological:  Negative for weakness.   Psychiatric/Behavioral:  The patient is nervous/anxious.      Reviewed allergy, medical, social, surgical, family and med list changes and updated   Files     Social History     Socioeconomic History    Marital status:    Tobacco Use    Smoking status: Never    Smokeless tobacco: Never   Substance and Sexual Activity    Alcohol use: No    Drug use: No     Social Determinants of Health     Financial Resource Strain: Low Risk  (4/9/2024)    Overall Financial Resource Strain (CARDIA)     Difficulty of Paying Living Expenses: Not very hard   Food Insecurity: No Food Insecurity (4/9/2024)    Hunger Vital Sign     Worried About Running Out of Food in the Last Year: Never true     Ran Out of Food in the Last Year: Never true   Transportation Needs: Unknown (4/9/2024)    PRAPARE - Transportation     Lack of Transportation (Non-Medical): No   Physical Activity: Insufficiently Active (8/9/2023)    Exercise Vital Sign     Days of Exercise per Week: 3 days     Minutes of Exercise per Session: 30 min   Housing Stability: Unknown (4/9/2024)    Housing Stability Vital Sign     Unstable Housing in the Last Year: No     Current Outpatient Medications   Medication Sig Dispense Refill    sertraline (ZOLOFT) 25 MG tablet Take 1 tablet by mouth daily      hydrOXYzine HCl

## 2024-06-27 ENCOUNTER — TELEPHONE (OUTPATIENT)
Dept: FAMILY MEDICINE CLINIC | Age: 89
End: 2024-06-27

## 2024-06-27 NOTE — TELEPHONE ENCOUNTER
Alen (patients son on HIPAA) came into the office and dropped off [facility] forms that need completed.    Please complete and call for  at [phone]    *Forms scanned into media and also in mailbox

## 2024-07-02 ENCOUNTER — TELEPHONE (OUTPATIENT)
Dept: FAMILY MEDICINE CLINIC | Age: 89
End: 2024-07-02

## 2024-07-02 NOTE — TELEPHONE ENCOUNTER
PT son called- PT is wanting to start taking vit B-6. He is wanting to know if this would be okay for her to start taking

## 2024-07-03 RX ORDER — SERTRALINE HYDROCHLORIDE 25 MG/1
25 TABLET, FILM COATED ORAL DAILY
Qty: 90 TABLET | Refills: 0 | Status: SHIPPED | OUTPATIENT
Start: 2024-07-03

## 2024-07-03 NOTE — TELEPHONE ENCOUNTER
No answer on home phone, Son's number [phone] no longer in service. Per Dr. Figueroa its ok to start Vit B

## 2024-07-05 ENCOUNTER — TELEPHONE (OUTPATIENT)
Dept: FAMILY MEDICINE CLINIC | Age: 89
End: 2024-07-05

## 2024-07-05 NOTE — TELEPHONE ENCOUNTER
Tried calling patient to no answer, son's Asael phone number not in service.  Veterans forms ready for  at .

## 2024-07-10 DIAGNOSIS — F41.9 ANXIETY: Primary | ICD-10-CM

## 2024-07-10 NOTE — TELEPHONE ENCOUNTER
Patient called stating she threw away the zoloft because it makes her crazy. She said the only thing that helps her anxiety is hydroxyzine.  Patient was very upset and crying on the phone. She wants to go back on the hydroxyzine and is asking for it to be sent to [facility] on [address].      Please advise, thank you.

## 2024-07-11 RX ORDER — HYDROXYZINE HYDROCHLORIDE 25 MG/1
25 TABLET, FILM COATED ORAL 3 TIMES DAILY PRN
Qty: 30 TABLET | Refills: 0 | Status: SHIPPED | OUTPATIENT
Start: 2024-07-11 | End: 2024-07-12 | Stop reason: SDUPTHER

## 2024-07-12 RX ORDER — HYDROXYZINE HYDROCHLORIDE 25 MG/1
25 TABLET, FILM COATED ORAL 3 TIMES DAILY PRN
Qty: 30 TABLET | Refills: 0 | Status: SHIPPED | OUTPATIENT
Start: 2024-07-12 | End: 2024-07-22

## 2024-07-12 NOTE — TELEPHONE ENCOUNTER
Patient called in stating that she went to the [email or web address] on [address] to  her prescription of the hydrOXYzine, but they didn't have it.      Looking at the script that was sent yesterday, it was sent to the [facility] on [facility].  Patient stated that this is incorrect as the [facility] pharmacies have closed and she requested this to be sent to [email or web address] on [address].      Can the script be changed and sent to [facility] for patient to ?      Thank you!

## 2024-07-18 ENCOUNTER — APPOINTMENT (OUTPATIENT)
Dept: BEHAVIORAL HEALTH | Facility: CLINIC | Age: 89
End: 2024-07-18
Payer: MEDICARE

## 2024-08-01 ENCOUNTER — APPOINTMENT (OUTPATIENT)
Dept: BEHAVIORAL HEALTH | Facility: CLINIC | Age: 89
End: 2024-08-01
Payer: MEDICARE

## 2024-08-15 DIAGNOSIS — F41.9 ANXIETY: ICD-10-CM

## 2024-08-15 NOTE — TELEPHONE ENCOUNTER
Patient requesting medication refill. Please approve or deny this request.    Rx requested:  Requested Prescriptions     Pending Prescriptions Disp Refills    hydrOXYzine HCl (ATARAX) 25 MG tablet 30 tablet 0     Sig: Take 1 tablet by mouth 3 times daily as needed for Anxiety         Last Office Visit:   6/26/2024      Next Visit Date:  No future appointments.

## 2024-08-16 ENCOUNTER — TELEPHONE (OUTPATIENT)
Dept: FAMILY MEDICINE CLINIC | Age: 89
End: 2024-08-16

## 2024-08-16 RX ORDER — HYDROXYZINE HYDROCHLORIDE 25 MG/1
25 TABLET, FILM COATED ORAL 3 TIMES DAILY PRN
Qty: 30 TABLET | Refills: 0 | Status: SHIPPED | OUTPATIENT
Start: 2024-08-16 | End: 2024-08-26

## 2024-08-16 NOTE — TELEPHONE ENCOUNTER
Called the [facility] to see if we can help the patient get rides to and from her doctor appts.  Heather said that the patient has to call her to answer some questions and would have to sign off a form with them.   Stacey will be contacting the patient with the info.   Office appts would have to be radha'jame between 9 - 2;30  m-f    fyi

## 2024-08-19 DIAGNOSIS — F41.9 ANXIETY: ICD-10-CM

## 2024-08-19 RX ORDER — HYDROXYZINE HYDROCHLORIDE 25 MG/1
25 TABLET, FILM COATED ORAL 3 TIMES DAILY PRN
Qty: 30 TABLET | Refills: 0 | OUTPATIENT
Start: 2024-08-19 | End: 2024-08-29

## 2024-08-19 NOTE — TELEPHONE ENCOUNTER
MEDICATION REFILL REQUEST     Rx Requested    Requested Prescriptions     Pending Prescriptions Disp Refills    hydrOXYzine HCl (ATARAX) 25 MG tablet 30 tablet 0     Sig: Take 1 tablet by mouth 3 times daily as needed for Anxiety         Patient's Last Office Visit   6/26/2024      Patient's Next Office Visit   No future appointments.      Other comments

## 2024-08-26 NOTE — TELEPHONE ENCOUNTER
I have tried calling the patient several times about transportation. The phone just rings and there is no voicemail. I will keep trying.

## 2024-10-24 DIAGNOSIS — F41.9 ANXIETY: ICD-10-CM

## 2024-10-24 RX ORDER — HYDROXYZINE HYDROCHLORIDE 25 MG/1
25 TABLET, FILM COATED ORAL 3 TIMES DAILY PRN
Qty: 30 TABLET | Refills: 0 | Status: SHIPPED | OUTPATIENT
Start: 2024-10-24 | End: 2024-11-03

## 2024-10-24 NOTE — TELEPHONE ENCOUNTER
Patient requesting medication refill. Please approve or deny this request.    LOV:6/26/24 NOV:11/5/24    Rx requested:  Requested Prescriptions     Pending Prescriptions Disp Refills    hydrOXYzine HCl (ATARAX) 25 MG tablet 30 tablet 0     Sig: Take 1 tablet by mouth 3 times daily as needed for Anxiety         Last Office Visit:   6/26/2024      Next Visit Date:  Future Appointments   Date Time Provider Department Center   11/5/2024  2:45 PM Jose Quintero MD [facility]

## 2024-11-20 ENCOUNTER — TELEPHONE (OUTPATIENT)
Dept: FAMILY MEDICINE CLINIC | Age: 89
End: 2024-11-20

## 2024-11-20 DIAGNOSIS — F41.9 ANXIETY: ICD-10-CM

## 2024-11-20 RX ORDER — HYDROXYZINE HYDROCHLORIDE 25 MG/1
25 TABLET, FILM COATED ORAL 3 TIMES DAILY PRN
Qty: 30 TABLET | Refills: 0 | Status: SHIPPED | OUTPATIENT
Start: 2024-11-20 | End: 2024-11-30

## 2024-11-20 NOTE — TELEPHONE ENCOUNTER
I spoke to patient about lack of transportation for appointments. I gave her the number to [facility] and told her to ask for Heather. They will be able to assist with rides to and from appointments.    The number is [phone].

## 2024-11-21 NOTE — TELEPHONE ENCOUNTER
Left voicemail to please call into office at [phone] to schedule appointment for future medication refills.

## 2024-11-22 NOTE — TELEPHONE ENCOUNTER
Left voicemail to please call into office at [phone] to schedule appointment for future medication refills.

## 2025-02-12 NOTE — TELEPHONE ENCOUNTER
Future Appointments    This patient does not currently have any appointments scheduled.   Past Visits    Date Provider Specialty Visit Type Primary Dx   08/09/2023 Leona Lynn MD Family Medicine Office Visit Chronic insomnia
Female

## 2025-02-14 ENCOUNTER — TELEPHONE (OUTPATIENT)
Age: 89
End: 2025-02-14

## 2025-02-14 NOTE — TELEPHONE ENCOUNTER
Nilam from [facility] called wanting to know if PCP will follow for home health.   Nilam [phone]